# Patient Record
Sex: FEMALE | Race: ASIAN | NOT HISPANIC OR LATINO | Employment: FULL TIME | URBAN - METROPOLITAN AREA
[De-identification: names, ages, dates, MRNs, and addresses within clinical notes are randomized per-mention and may not be internally consistent; named-entity substitution may affect disease eponyms.]

---

## 2017-11-03 ENCOUNTER — OFFICE VISIT (OUTPATIENT)
Dept: OBGYN | Facility: CLINIC | Age: 27
End: 2017-11-03
Payer: COMMERCIAL

## 2017-11-03 VITALS
HEIGHT: 62 IN | DIASTOLIC BLOOD PRESSURE: 78 MMHG | WEIGHT: 160 LBS | BODY MASS INDEX: 29.44 KG/M2 | SYSTOLIC BLOOD PRESSURE: 122 MMHG

## 2017-11-03 DIAGNOSIS — Z97.5 IUD (INTRAUTERINE DEVICE) IN PLACE: ICD-10-CM

## 2017-11-03 DIAGNOSIS — Z01.419 ENCOUNTER FOR GYNECOLOGICAL EXAMINATION WITHOUT ABNORMAL FINDING: Primary | ICD-10-CM

## 2017-11-03 PROCEDURE — 99395 PREV VISIT EST AGE 18-39: CPT | Performed by: OBSTETRICS & GYNECOLOGY

## 2017-11-03 ASSESSMENT — ANXIETY QUESTIONNAIRES
1. FEELING NERVOUS, ANXIOUS, OR ON EDGE: NOT AT ALL
5. BEING SO RESTLESS THAT IT IS HARD TO SIT STILL: NOT AT ALL
7. FEELING AFRAID AS IF SOMETHING AWFUL MIGHT HAPPEN: NOT AT ALL
6. BECOMING EASILY ANNOYED OR IRRITABLE: NOT AT ALL
3. WORRYING TOO MUCH ABOUT DIFFERENT THINGS: NOT AT ALL
IF YOU CHECKED OFF ANY PROBLEMS ON THIS QUESTIONNAIRE, HOW DIFFICULT HAVE THESE PROBLEMS MADE IT FOR YOU TO DO YOUR WORK, TAKE CARE OF THINGS AT HOME, OR GET ALONG WITH OTHER PEOPLE: NOT DIFFICULT AT ALL
GAD7 TOTAL SCORE: 0
2. NOT BEING ABLE TO STOP OR CONTROL WORRYING: NOT AT ALL

## 2017-11-03 ASSESSMENT — PATIENT HEALTH QUESTIONNAIRE - PHQ9
5. POOR APPETITE OR OVEREATING: NOT AT ALL
SUM OF ALL RESPONSES TO PHQ QUESTIONS 1-9: 0

## 2017-11-03 NOTE — MR AVS SNAPSHOT
After Visit Summary   11/3/2017    Cassandra Langley    MRN: [id number]           Patient Information     Date Of Birth          1990        Visit Information        Provider Department      11/3/2017 10:40 AM Jes Foley MD [facility] [place]        Today's Diagnoses     Encounter for gynecological examination without abnormal finding    -  1    IUD (intrauterine device) in place          Care Instructions    Follow up with your primary care provider for your other medical problems.  Continue self breast exam.  Increase physical activity and exercise.  Usual safety and preventative measures counseling done.  BMI >25  Weight loss encouraged.  Last pap smear (2015) was normal.  No pap was obtained this year.  This was discussed with the patient and she agrees with the plan.          Follow-ups after your visit        Follow-up notes from your care team     Return in about 1 year (around 11/3/2018) for Annual Exam.      Who to contact     If you have questions or need follow up information about today's clinic visit or your schedule please contact [facility] [place] directly at [phone].  Normal or non-critical lab and imaging results will be communicated to you by meevlhart, letter or phone within 4 business days after the clinic has received the results. If you do not hear from us within 7 days, please contact the clinic through [employer] or phone. If you have a critical or abnormal lab result, we will notify you by phone as soon as possible.  Submit refill requests through [employer] or call your pharmacy and they will forward the refill request to us. Please allow 3 business days for your refill to be completed.          Additional Information About Your Visit        meevlhart Information     [employer] gives you secure access to your electronic health record. If you see a primary care provider, you can also send messages to your care team and make appointments. If  "you have questions, please call your primary care clinic.  If you do not have a primary care provider, please call [phone] and they will assist you.        Care EveryWhere ID     This is your Care EveryWhere ID. This could be used by other organizations to access your [place] medical records  [id number]        Your Vitals Were     Height Breastfeeding? BMI (Body Mass Index)             5' 2\" (1.575 m) No 29.26 kg/m2          Blood Pressure from Last 3 Encounters:   11/03/17 122/78   11/01/16 112/80   08/23/16 116/78    Weight from Last 3 Encounters:   11/03/17 160 lb (72.6 kg)   11/01/16 154 lb (69.9 kg)   08/23/16 154 lb (69.9 kg)              Today, you had the following     No orders found for display       Primary Care Provider Office Phone # Fax #    Miriam Faye [phone]       [address] [place] [address]        Equal Access to Services     [facility] : Hadii diandra ku hadasho Soomaali, waaxda luqadaha, qaybta kaalmada adeegyada, joann mahoney . So [facility] [phone].    ATENCIÓN: Si habla español, tiene a smith disposición servicios gratuitos de asistencia lingüística. Llame al [phone].    We comply with applicable federal civil rights laws and [place] laws. We do not discriminate on the basis of race, color, national origin, age, disability, sex, sexual orientation, or gender identity.            Thank you!     Thank you for choosing [facility] [place]  for your care. Our goal is always to provide you with excellent care. Hearing back from our patients is one way we can continue to improve our services. Please take a few minutes to complete the written survey that you may receive in the mail after your visit with us. Thank you!             Your Updated Medication List - Protect others around you: Learn how to safely use, store and throw away your medicines at www.disposemymeds.org.          This list is accurate as " of: 11/3/17 11:06 AM.  Always use your most recent med list.                   Brand Name Dispense Instructions for use Diagnosis    ADVIL PO           * KRISTOPHER 13.5 MG Iud   Generic drug:  Levonorgestrel           * levonorgestrel 20 MCG/24HR IUD    MIRENA    1 each    1 each by Intrauterine route once.        * Notice:  This list has 2 medication(s) that are the same as other medications prescribed for you. Read the directions carefully, and ask your doctor or other care provider to review them with you.

## 2017-11-03 NOTE — PATIENT INSTRUCTIONS
Follow up with your primary care provider for your other medical problems.  Continue self breast exam.  Increase physical activity and exercise.  Usual safety and preventative measures counseling done.  BMI >25  Weight loss encouraged.  Last pap smear (2015) was normal.  No pap was obtained this year.  This was discussed with the patient and she agrees with the plan.

## 2017-11-03 NOTE — PROGRESS NOTES
Cassandra is a 27 year old  female who presents for annual exam.     Besides routine health maintenance, she has no other health concerns today .    HPI:  Here today for yearly exam --doing well.  Short, monthly menses x 3d with vikash IUD.  First day heavy and with cramps but then tapers quickly.  +SA --no issues.  Denies bowel/bladder issues.    ; works for [employer] --promoted last year  -trying to stay active; works out at [employer] 2x/wk --hoping to increase this year  +SBE on occ --no issues  No PCP  -not sure if she had her flu shot at work --will check  -considering TTC next year;  would like to have 3-4 kids but she is unsure :)      GYNECOLOGIC HISTORY:    No LMP recorded. Patient is not currently having periods (Reason: IUD).  Her current contraception method is: depo or other injectable.  She  reports that she has never smoked. She has never used smokeless tobacco.      Patient is sexually active.  STD testing offered?  Declined  Last PHQ-9 score on record =   PHQ-9 SCORE 11/3/2017   Total Score 0     Last GAD7 score on record =   IVAN-7 SCORE 11/3/2017   Total Score 0     Alcohol Score = 2    HEALTH MAINTENANCE:  Cholesterol: (No results found for: CHOL NA  Last Mammo: NA, Result: not applicable, Next Mammo: NA   Pap: (  Lab Results   Component Value Date    PAP NIL 10/15/2015    10/15/15 WNL   Colonoscopy:  NA, Result: not applicable, Next Colonoscopy: NA years.  Dexa:  NA    Health maintenance updated:  yes    HISTORY:  Obstetric History       T0      L0     SAB0   TAB0   Ectopic0   Multiple0   Live Births0           Patient Active Problem List   Diagnosis     CARDIOVASCULAR SCREENING; LDL GOAL LESS THAN 160     IUD (intrauterine device) in place     Past Surgical History:   Procedure Laterality Date     NO HISTORY OF SURGERY        Social History   Substance Use Topics     Smoking status: Never Smoker     Smokeless tobacco: Never Used     Alcohol  "use 0.0 oz/week     0 Standard drinks or equivalent per week      Comment: Socially, maybe once a month      Problem (# of Occurrences) Relation (Name,Age of Onset)    Hyperlipidemia (1) Father            Current Outpatient Prescriptions   Medication Sig     Levonorgestrel (KRISTOPHER) 13.5 MG IUD      Ibuprofen (ADVIL PO)      levonorgestrel (MIRENA) 20 MCG/24HR IUD 1 each by Intrauterine route once.     No current facility-administered medications for this visit.      Allergies   Allergen Reactions     Keflex [Cephalosporins]        Past medical, surgical, social and family histories were reviewed and updated in EPIC.    ROS:   12 point review of systems negative other than symptoms noted below.    EXAM:  /78  Ht 5' 2\" (1.575 m)  Wt 160 lb (72.6 kg)  Breastfeeding? No  BMI 29.26 kg/m2   BMI: Body mass index is 29.26 kg/(m^2).    PHYSICAL EXAM:  Constitutional:  Appearance: Well nourished, well developed, alert, in no acute distress  Neck:  Lymph Nodes:  No lymphadenopathy present    Thyroid:  Gland size normal, nontender, no nodules or masses present  on palpation  Chest:  Respiratory Effort:  Breathing unlabored  Cardiovascular:    Heart: Auscultation:  Regular rate, normal rhythm, no murmurs present  Breasts: Inspection of Breasts:  No lymphadenopathy present., Palpation of Breasts and Axillae:  No masses present on palpation, no breast tenderness., Axillary Lymph Nodes:  No lymphadenopathy present. and No nodularity, asymmetry or nipple discharge bilaterally.  Gastrointestinal:   Abdominal Examination:  Abdomen nontender to palpation, tone normal without rigidity or guarding, no masses present, umbilicus without lesions   Liver and Spleen:  No hepatomegaly present, liver nontender to palpation    Hernias:  No hernias present  Lymphatic: Lymph Nodes:  No other lymphadenopathy present  Skin:  General Inspection:  No rashes present, no lesions present, no areas of  discoloration    Genitalia and Groin:  No " rashes present, no lesions present, no areas of  discoloration, no masses present  Neurologic/Psychiatric:    Mental Status:  Oriented X3     Pelvic Exam:  External Genitalia:     Normal appearance for age, no discharge present, no tenderness present, no inflammatory lesions present, color normal  Vagina:    Normal vaginal vault without central or paravaginal defects, no discharge present, no inflammatory lesions present, no masses present  Bladder:     Nontender to palpation  Urethra:   Urethral Body:  Urethra palpation normal, urethra structural support normal   Urethral Meatus:  No erythema or lesions present  Cervix:     Appearance healthy, no lesions present, nontender to palpation, no bleeding present, string present  Uterus:     Nontender to palpation, no masses present, position anteflexed, mobility: normal  Adnexa:     No adnexal tenderness present, no adnexal masses present  Perineum:     Perineum within normal limits, no evidence of trauma, no rashes or skin lesions present  Anus:     Anus within normal limits, no hemorrhoids present  Inguinal Lymph Nodes:     No lymphadenopathy present  Pubic Hair:     Normal pubic hair distribution for age  Genitalia and Groin:     No rashes present, no lesions present, no areas of discoloration, no masses present      COUNSELING:   Reviewed preventive health counseling, as reflected in patient instructions  Special attention given to:        Regular exercise       Healthy diet/nutrition       Family planning       Folic Acid Counseling      BMI: Body mass index is 29.26 kg/(m^2).  Weight management plan: Discussed healthy diet and exercise guidelines and patient will follow up in 12 months in clinic to re-evaluate.    ASSESSMENT:  27 year old female with satisfactory annual exam.    ICD-10-CM    1. Encounter for gynecological examination without abnormal finding Z01.419    2. IUD (intrauterine device) in place Z97.5        PLAN:  Patient Instructions   Follow up with  your primary care provider for your other medical problems.  Continue self breast exam.  Increase physical activity and exercise.  Usual safety and preventative measures counseling done.  BMI >25  Weight loss encouraged.  Last pap smear (2015) was normal.  No pap was obtained this year.  This was discussed with the patient and she agrees with the plan.      Jes Foley MD

## 2017-11-04 ASSESSMENT — ANXIETY QUESTIONNAIRES: GAD7 TOTAL SCORE: 0

## 2017-12-19 ENCOUNTER — OFFICE VISIT (OUTPATIENT)
Dept: FAMILY MEDICINE | Facility: CLINIC | Age: 27
End: 2017-12-19
Payer: COMMERCIAL

## 2017-12-19 VITALS
SYSTOLIC BLOOD PRESSURE: 127 MMHG | HEART RATE: 77 BPM | BODY MASS INDEX: 28.12 KG/M2 | TEMPERATURE: 98.4 F | DIASTOLIC BLOOD PRESSURE: 84 MMHG | HEIGHT: 62 IN | WEIGHT: 152.8 LBS | RESPIRATION RATE: 14 BRPM | OXYGEN SATURATION: 97 %

## 2017-12-19 DIAGNOSIS — R10.2 PELVIC CRAMPING: Primary | ICD-10-CM

## 2017-12-19 PROCEDURE — 99213 OFFICE O/P EST LOW 20 MIN: CPT | Performed by: FAMILY MEDICINE

## 2017-12-19 NOTE — NURSING NOTE
"Chief Complaint   Patient presents with     Abdominal Pain       Initial /84 (BP Location: Right arm, Patient Position: Chair, Cuff Size: Adult Large)  Pulse 77  Temp 98.4  F (36.9  C) (Oral)  Resp 14  Ht 5' 2\" (1.575 m)  Wt 152 lb 12.8 oz (69.3 kg)  SpO2 97%  BMI 27.95 kg/m2 Estimated body mass index is 27.95 kg/(m^2) as calculated from the following:    Height as of this encounter: 5' 2\" (1.575 m).    Weight as of this encounter: 152 lb 12.8 oz (69.3 kg).  Medication Reconciliation: complete   "

## 2017-12-19 NOTE — MR AVS SNAPSHOT
After Visit Summary   12/19/2017    Cassandra Langley    MRN: [id number]           Patient Information     Date Of Birth          1990        Visit Information        Provider Department      12/19/2017 9:40 AM Faustina Mcfarland,  [facility]        Today's Diagnoses     Pelvic cramping    -  1       Follow-ups after your visit        Your next 10 appointments already scheduled     Nov 06, 2018 10:40 AM CST   PHYSICAL with Jes Foley MD   [facility] ([facility] [place])    [address] [place] [id number] [phone]              Who to contact     If you have questions or need follow up information about today's clinic visit or your schedule please contact [facility] directly at [phone].  Normal or non-critical lab and imaging results will be communicated to you by MyChart, letter or phone within 4 business days after the clinic has received the results. If you do not hear from us within 7 days, please contact the clinic through MyChart or phone. If you have a critical or abnormal lab result, we will notify you by phone as soon as possible.  Submit refill requests through [email or web address] or call your pharmacy and they will forward the refill request to us. Please allow 3 business days for your refill to be completed.          Additional Information About Your Visit        MyChart Information     [email or web address] gives you secure access to your electronic health record. If you see a primary care provider, you can also send messages to your care team and make appointments. If you have questions, please call your primary care clinic.  If you do not have a primary care provider, please call [phone] and they will assist you.        Care EveryWhere ID     This is your Care EveryWhere ID. This could be used by other organizations to access your [place] medical records  [id number]        Your  "Vitals Were     Pulse Temperature Respirations Height Pulse Oximetry BMI (Body Mass Index)    77 98.4  F (36.9  C) (Oral) 14 5' 2\" (1.575 m) 97% 27.95 kg/m2       Blood Pressure from Last 3 Encounters:   12/19/17 127/84   11/03/17 122/78   11/01/16 112/80    Weight from Last 3 Encounters:   12/19/17 152 lb 12.8 oz (69.3 kg)   11/03/17 160 lb (72.6 kg)   11/01/16 154 lb (69.9 kg)              Today, you had the following     No orders found for display       Primary Care Provider Office Phone # Fax #    Miriam Faye [phone]       [facility] [address]        Equal Access to Services     [facility] : Joni heart Soanita, waaxda luqadaha, qaybta kaalmada silver, joann mahoney . So [facility] [phone].    ATENCIÓN: Si habla español, tiene a smith disposición servicios gratuitos de asistencia lingüística. [facility] [phone].    We comply with applicable federal civil rights laws and [place] laws. We do not discriminate on the basis of race, color, national origin, age, disability, sex, sexual orientation, or gender identity.            Thank you!     Thank you for choosing [facility]  for your care. Our goal is always to provide you with excellent care. Hearing back from our patients is one way we can continue to improve our services. Please take a few minutes to complete the written survey that you may receive in the mail after your visit with us. Thank you!             Your Updated Medication List - Protect others around you: Learn how to safely use, store and throw away your medicines at www.disposemymeds.org.          This list is accurate as of: 12/19/17 10:05 AM.  Always use your most recent med list.                   Brand Name Dispense Instructions for use Diagnosis    ADVIL PO           * KRISTOPHER 13.5 MG Iud   Generic drug:  Levonorgestrel           * levonorgestrel 20 MCG/24HR IUD    MIRENA    1 each "    1 each by Intrauterine route once.        * Notice:  This list has 2 medication(s) that are the same as other medications prescribed for you. Read the directions carefully, and ask your doctor or other care provider to review them with you.

## 2017-12-19 NOTE — PROGRESS NOTES
SUBJECTIVE:   Cassandra Langley is a 27 year old female who presents to clinic today for the following health issues:      ABDOMINAL   PAIN     Onset: 2 days     Description:   Character: Cramping  Location: right lower quadrant left lower quadrant carlita-umbilical region pelvic region  Radiation: None and Back    Intensity: moderate    Progression of Symptoms:  improving    Accompanying Signs & Symptoms:  Fever/Chills?: no   Gas/Bloating: YES  Nausea: no   Vomitting: no   Diarrhea?: no   Constipation:no   Dysuria or Hematuria: no    History:   Trauma: no   Previous similar pain: no    Previous tests done: none    Precipitating factors:   Does the pain change with:     Food: no      BM: no     Urination: no     Alleviating factors:  None    Therapies Tried and outcome: None    LMP:  Last week      History of of Mirena IUD, but had it replaced with Jessica in October since it was causing cramping.  No issues since placement until now.  Recently finished menses and notes continued pelvic cramping (worse on the left).  Overall, cramping is improving, but she is worried that the IUD might have shifted.    Problem list and histories reviewed & adjusted, as indicated.  Additional history: as documented    Patient Active Problem List   Diagnosis     CARDIOVASCULAR SCREENING; LDL GOAL LESS THAN 160     IUD (intrauterine device) in place     Past Surgical History:   Procedure Laterality Date     NO HISTORY OF SURGERY         Social History   Substance Use Topics     Smoking status: Never Smoker     Smokeless tobacco: Never Used     Alcohol use 0.0 oz/week     0 Standard drinks or equivalent per week      Comment: Socially, maybe once a month     Family History   Problem Relation Age of Onset     Hyperlipidemia Father              Reviewed and updated as needed this visit by clinical staff       Reviewed and updated as needed this visit by Provider         ROS:  Constitutional, HEENT, cardiovascular, pulmonary, gi and gu  "systems are negative, except as otherwise noted.      OBJECTIVE:   /84 (BP Location: Right arm, Patient Position: Chair, Cuff Size: Adult Large)  Pulse 77  Temp 98.4  F (36.9  C) (Oral)  Resp 14  Ht 5' 2\" (1.575 m)  Wt 152 lb 12.8 oz (69.3 kg)  SpO2 97%  BMI 27.95 kg/m2  Body mass index is 27.95 kg/(m^2).  GENERAL: healthy, alert and no distress   (female): normal female external genitalia, normal urethral meatus , vaginal mucosa pink, moist, well rugated and normal cervix, adnexae, and uterus without masses, mild uterine tenderness on exam, no cervical motion tenderness, IUD strings visible    ASSESSMENT/PLAN:     1. Pelvic cramping  - Most likely from menstrual cramps which are worsened by her IUD  - Reassured pt that it appears IUD has not traveled   - Offered a pelvic US if cramping continues to bother her  - Ibuprofen PRN     Follow up if symptoms are worsening or not improving    Faustina Mcfarland, DO  [facility]"

## 2018-11-06 ENCOUNTER — OFFICE VISIT (OUTPATIENT)
Dept: OBGYN | Facility: CLINIC | Age: 28
End: 2018-11-06
Payer: COMMERCIAL

## 2018-11-06 VITALS
HEART RATE: 68 BPM | BODY MASS INDEX: 28.34 KG/M2 | HEIGHT: 62 IN | SYSTOLIC BLOOD PRESSURE: 120 MMHG | DIASTOLIC BLOOD PRESSURE: 80 MMHG | WEIGHT: 154 LBS

## 2018-11-06 DIAGNOSIS — Z23 NEED FOR PROPHYLACTIC VACCINATION AND INOCULATION AGAINST INFLUENZA: ICD-10-CM

## 2018-11-06 DIAGNOSIS — Z01.419 ENCOUNTER FOR GYNECOLOGICAL EXAMINATION WITHOUT ABNORMAL FINDING: Primary | ICD-10-CM

## 2018-11-06 DIAGNOSIS — Z97.5 IUD (INTRAUTERINE DEVICE) IN PLACE: ICD-10-CM

## 2018-11-06 PROCEDURE — G0145 SCR C/V CYTO,THINLAYER,RESCR: HCPCS | Performed by: OBSTETRICS & GYNECOLOGY

## 2018-11-06 PROCEDURE — 99395 PREV VISIT EST AGE 18-39: CPT | Mod: 25 | Performed by: OBSTETRICS & GYNECOLOGY

## 2018-11-06 PROCEDURE — 90471 IMMUNIZATION ADMIN: CPT | Performed by: OBSTETRICS & GYNECOLOGY

## 2018-11-06 PROCEDURE — 90686 IIV4 VACC NO PRSV 0.5 ML IM: CPT | Performed by: OBSTETRICS & GYNECOLOGY

## 2018-11-06 ASSESSMENT — ANXIETY QUESTIONNAIRES
1. FEELING NERVOUS, ANXIOUS, OR ON EDGE: NOT AT ALL
7. FEELING AFRAID AS IF SOMETHING AWFUL MIGHT HAPPEN: NOT AT ALL
6. BECOMING EASILY ANNOYED OR IRRITABLE: NOT AT ALL
2. NOT BEING ABLE TO STOP OR CONTROL WORRYING: NOT AT ALL
3. WORRYING TOO MUCH ABOUT DIFFERENT THINGS: NOT AT ALL
IF YOU CHECKED OFF ANY PROBLEMS ON THIS QUESTIONNAIRE, HOW DIFFICULT HAVE THESE PROBLEMS MADE IT FOR YOU TO DO YOUR WORK, TAKE CARE OF THINGS AT HOME, OR GET ALONG WITH OTHER PEOPLE: NOT DIFFICULT AT ALL
5. BEING SO RESTLESS THAT IT IS HARD TO SIT STILL: NOT AT ALL
GAD7 TOTAL SCORE: 0

## 2018-11-06 ASSESSMENT — PATIENT HEALTH QUESTIONNAIRE - PHQ9
SUM OF ALL RESPONSES TO PHQ QUESTIONS 1-9: 0
5. POOR APPETITE OR OVEREATING: NOT AT ALL

## 2018-11-06 NOTE — PROGRESS NOTES
Cassandra is a 28 year old  female who presents for annual exam.     Besides routine health maintenance, she has no other health concerns today .    HPI:  Here today for yearly exam --doing well.  Monthly 3d menses with vikash IUD (2016) in place.  Heavier on first day with some cramping and then tapers quickly.  No intermenstrual bleeding/spotting.  +SA ---no issues.  Will be due for IUD removal next summer and will likely start TTC within the following year.  Had one 4-5d episode of cramping --saw her PCP and now resovled. No bowel/bladder issues.  No leaking or urgency issues.    ; works in [employer] for [email or web address]  -staying active; working out weekly currently  +SBE on occ --no concerns  Sees her PCP as needed  -agrees to flu shot today      GYNECOLOGIC HISTORY:    Patient's last menstrual period was 10/09/2018 (exact date).  Her current contraception method is: IUD.  She  reports that she has never smoked. She has never used smokeless tobacco.    Patient is sexually active.  STD testing offered?  Declined  Last PHQ-9 score on record =   PHQ-9 SCORE 2018   Total Score 0     Last GAD7 score on record =   IVAN-7 SCORE 2018   Total Score 0     Alcohol Score = 3    HEALTH MAINTENANCE:  Cholesterol: (No results found for: CHOL   Last Mammo: NA, due at age 39  Pap: 10/15/15 neg, HPV-  Colonoscopy: NA, due at age 49  Dexa: Never    Health maintenance updated:  yes    HISTORY:  Obstetric History       T0      L0     SAB0   TAB0   Ectopic0   Multiple0   Live Births0           Patient Active Problem List   Diagnosis     CARDIOVASCULAR SCREENING; LDL GOAL LESS THAN 160     IUD (intrauterine device) in place     Past Surgical History:   Procedure Laterality Date     NO HISTORY OF SURGERY        Social History   Substance Use Topics     Smoking status: Never Smoker     Smokeless tobacco: Never Used     Alcohol use 0.0 oz/week     0 Standard drinks or equivalent per week      Comment:  "Socially, maybe once a month      Problem (# of Occurrences) Relation (Name,Age of Onset)    Hyperlipidemia (1) Father            Current Outpatient Prescriptions   Medication Sig     Ibuprofen (ADVIL PO)      Levonorgestrel (KRISTOPHER) 13.5 MG IUD      [DISCONTINUED] levonorgestrel (MIRENA) 20 MCG/24HR IUD 1 each by Intrauterine route once.     No current facility-administered medications for this visit.      Allergies   Allergen Reactions     Keflex [Cephalosporins]        Past medical, surgical, social and family histories were reviewed and updated in EPIC.    ROS:   12 point review of systems negative other than symptoms noted below.    EXAM:  /80  Pulse 68  Ht 5' 2\" (1.575 m)  Wt 154 lb (69.9 kg)  LMP 10/09/2018 (Exact Date)  BMI 28.17 kg/m2   BMI: Body mass index is 28.17 kg/(m^2).    PHYSICAL EXAM:  Constitutional:  Appearance: Well nourished, well developed, alert, in no acute distress  Neck:  Lymph Nodes:  No lymphadenopathy present    Thyroid:  Gland size normal, nontender, no nodules or masses present  on palpation  Chest:  Respiratory Effort:  Breathing unlabored  Cardiovascular:    Heart: Auscultation:  Regular rate, normal rhythm, no murmurs present  Breasts: Inspection of Breasts:  No lymphadenopathy present., Palpation of Breasts and Axillae:  No masses present on palpation, no breast tenderness., Axillary Lymph Nodes:  No lymphadenopathy present. and No nodularity, asymmetry or nipple discharge bilaterally.  Gastrointestinal:   Abdominal Examination:  Abdomen nontender to palpation, tone normal without rigidity or guarding, no masses present, umbilicus without lesions   Liver and Spleen:  No hepatomegaly present, liver nontender to palpation    Hernias:  No hernias present  Lymphatic: Lymph Nodes:  No other lymphadenopathy present  Skin:  General Inspection:  No rashes present, no lesions present, no areas of  discoloration    Genitalia and Groin:  No rashes present, no lesions present, no " areas of  discoloration, no masses present  Neurologic/Psychiatric:    Mental Status:  Oriented X3     Pelvic Exam:  External Genitalia:     Normal appearance for age, no discharge present, no tenderness present, no inflammatory lesions present, color normal  Vagina:    Normal vaginal vault without central or paravaginal defects, no discharge present, no inflammatory lesions present, no masses present  Bladder:     Nontender to palpation  Urethra:   Urethral Body:  Urethra palpation normal, urethra structural support normal   Urethral Meatus:  No erythema or lesions present  Cervix:     Appearance healthy, no lesions present, nontender to palpation, no bleeding present, string present  Uterus:     Nontender to palpation, no masses present, position anteflexed, mobility: normal  Adnexa:     No adnexal tenderness present, no adnexal masses present  Perineum:     Perineum within normal limits, no evidence of trauma, no rashes or skin lesions present  Anus:     Anus within normal limits, no hemorrhoids present  Inguinal Lymph Nodes:     No lymphadenopathy present  Pubic Hair:     Normal pubic hair distribution for age  Genitalia and Groin:     No rashes present, no lesions present, no areas of discoloration, no masses present      COUNSELING:   Reviewed preventive health counseling, as reflected in patient instructions  Special attention given to:        Regular exercise       Healthy diet/nutrition       Contraception       Family planning       Folic Acid Counseling    BMI: Body mass index is 28.17 kg/(m^2).  Weight management plan: Discussed healthy diet and exercise guidelines and patient will follow up in 12 months in clinic to re-evaluate.    ASSESSMENT:  28 year old female with satisfactory annual exam.    ICD-10-CM    1. Encounter for gynecological examination without abnormal finding Z01.419    2. IUD (intrauterine device) in place Z97.5    3. Need for prophylactic vaccination and inoculation against influenza  Z23 FLU VACCINE, SPLIT VIRUS, IM (QUADRIVALENT) [69313]- >3 YRS     Vaccine Administration, Initial [10993]       PLAN:  Patient Instructions   Follow up with your primary care provider for your other medical problems.  Continue self breast exam.  Increase physical activity and exercise.  Lab and pap smear results will be called to the patient.  Reflex pap smear done today and will repeat in 3yrs if negative.  Usual safety and preventative measures counseling done.  BMI >25  Weight loss encouraged.  Flu Shot today.  Will return next August for IUD removal.      Jes Foley MD    Injectable Influenza Immunization Documentation    1.  Is the person to be vaccinated sick today?   No    2. Does the person to be vaccinated have an allergy to a component   of the vaccine?   No  Egg Allergy Algorithm Link    3. Has the person to be vaccinated ever had a serious reaction   to influenza vaccine in the past?   No    4. Has the person to be vaccinated ever had Guillain-Barré syndrome?   No    Form completed by Triny Pedroza MA

## 2018-11-06 NOTE — MR AVS SNAPSHOT
After Visit Summary   11/6/2018    Cassandra Langley    MRN: [id number]           Patient Information     Date Of Birth          1990        Visit Information        Provider Department      11/6/2018 10:40 AM Jes Foley MD [facility]        Today's Diagnoses     Encounter for gynecological examination without abnormal finding    -  1    IUD (intrauterine device) in place        Need for prophylactic vaccination and inoculation against influenza          Care Instructions    Follow up with your primary care provider for your other medical problems.  Continue self breast exam.  Increase physical activity and exercise.  Lab and pap smear results will be called to the patient.  Reflex pap smear done today and will repeat in 3yrs if negative.  Usual safety and preventative measures counseling done.  BMI >25  Weight loss encouraged.  Flu Shot today.  Will return next August for IUD removal.          Follow-ups after your visit        Follow-up notes from your care team     Return in about 1 year (around 11/6/2019) for Annual Exam.      Your next 10 appointments already scheduled     Aug 16, 2019 11:30 AM CDT   Office Visit with Jes Foley MD   [facility] ([facility])    [address] [place] [id number] [phone]           Bring a current list of meds and any records pertaining to this visit. For Physicals, please bring immunization records and any forms needing to be filled out. Please arrive 10 minutes early to complete paperwork.              Who to contact     If you have questions or need follow up information about today's clinic visit or your schedule please contact [facility] directly at [phone].  Normal or non-critical lab and imaging results will be communicated to you by MyChart, letter or phone within 4 business days after the clinic has received the  "results. If you do not hear from us within 7 days, please contact the clinic through [employer] or phone. If you have a critical or abnormal lab result, we will notify you by phone as soon as possible.  Submit refill requests through [employer] or call your pharmacy and they will forward the refill request to us. Please allow 3 business days for your refill to be completed.          Additional Information About Your Visit        [employer] Information     [employer] gives you secure access to your electronic health record. If you see a primary care provider, you can also send messages to your care team and make appointments. If you have questions, please call your primary care clinic.  If you do not have a primary care provider, please call [phone] and they will assist you.        Care EveryWhere ID     This is your Care EveryWhere ID. This could be used by other organizations to access your [facility] medical records  [id number]        Your Vitals Were     Pulse Height Last Period BMI (Body Mass Index)          68 5' 2\" (1.575 m) 10/09/2018 (Exact Date) 28.17 kg/m2         Blood Pressure from Last 3 Encounters:   11/06/18 120/80   12/19/17 127/84   11/03/17 122/78    Weight from Last 3 Encounters:   11/06/18 154 lb (69.9 kg)   12/19/17 152 lb 12.8 oz (69.3 kg)   11/03/17 160 lb (72.6 kg)              We Performed the Following     FLU VACCINE, SPLIT VIRUS, IM (QUADRIVALENT) [58675]- >3 YRS     Vaccine Administration, Initial [64650]        Primary Care Provider    None Specified       No primary provider on file.        Equal Access to Services     [facility] : Hadii diandra Parikh, wavee stratton, qaybta kaaljoann joshua. So [facility] [phone].    ATENCIÓN: Si habla español, tiene a smith disposición servicios gratuitos de asistencia lingüística. Llame al [phone].    We comply with applicable federal civil rights laws and [place] laws. We do not discriminate on " the basis of race, color, national origin, age, disability, sex, sexual orientation, or gender identity.            Thank you!     Thank you for choosing [facility]  for your care. Our goal is always to provide you with excellent care. Hearing back from our patients is one way we can continue to improve our services. Please take a few minutes to complete the written survey that you may receive in the mail after your visit with us. Thank you!             Your Updated Medication List - Protect others around you: Learn how to safely use, store and throw away your medicines at www.disposemymeds.org.          This list is accurate as of 11/6/18  1:18 PM.  Always use your most recent med list.                   Brand Name Dispense Instructions for use Diagnosis    ADVIL PO           KRISTOPHER 13.5 MG IUD   Generic drug:  levonorgestrel

## 2018-11-06 NOTE — PATIENT INSTRUCTIONS
Follow up with your primary care provider for your other medical problems.  Continue self breast exam.  Increase physical activity and exercise.  Lab and pap smear results will be called to the patient.  Reflex pap smear done today and will repeat in 3yrs if negative.  Usual safety and preventative measures counseling done.  BMI >25  Weight loss encouraged.  Flu Shot today.  Will return next August for IUD removal.

## 2018-11-07 ASSESSMENT — ANXIETY QUESTIONNAIRES: GAD7 TOTAL SCORE: 0

## 2018-11-09 LAB
COPATH REPORT: NORMAL
PAP: NORMAL

## 2019-09-30 ENCOUNTER — HEALTH MAINTENANCE LETTER (OUTPATIENT)
Age: 29
End: 2019-09-30

## 2019-10-24 ENCOUNTER — TELEPHONE (OUTPATIENT)
Dept: OBGYN | Facility: CLINIC | Age: 29
End: 2019-10-24

## 2019-10-24 NOTE — TELEPHONE ENCOUNTER
Pt calling with questions about her Jessica IUD  Scheduled IUD replacement slightly over the 3 year date, asking if it is ok to leave it in for one extra month.  Reassured pt she will be fine. The company is recommending 3 year replacement as they know the effectiveness for contraception for sure lasts that long, there is no adverse effects from staying in longer, just may not be as affective for contraception. BUM recommended.    Pt verbalized understanding, in agreement with plan, and voiced no further questions.  Deena Zapata RN on 10/24/2019 at 9:26 AM

## 2019-11-26 ENCOUNTER — OFFICE VISIT (OUTPATIENT)
Dept: OBGYN | Facility: CLINIC | Age: 29
End: 2019-11-26
Payer: COMMERCIAL

## 2019-11-26 VITALS — WEIGHT: 147 LBS | BODY MASS INDEX: 26.89 KG/M2 | DIASTOLIC BLOOD PRESSURE: 80 MMHG | SYSTOLIC BLOOD PRESSURE: 122 MMHG

## 2019-11-26 DIAGNOSIS — Z30.432 ENCOUNTER FOR IUD REMOVAL: ICD-10-CM

## 2019-11-26 DIAGNOSIS — Z30.49 ENCOUNTER FOR SURVEILLANCE OF NUVARING: ICD-10-CM

## 2019-11-26 DIAGNOSIS — Z01.419 ENCOUNTER FOR GYNECOLOGICAL EXAMINATION WITHOUT ABNORMAL FINDING: Primary | ICD-10-CM

## 2019-11-26 PROCEDURE — 58301 REMOVE INTRAUTERINE DEVICE: CPT | Performed by: OBSTETRICS & GYNECOLOGY

## 2019-11-26 PROCEDURE — 99395 PREV VISIT EST AGE 18-39: CPT | Mod: 25 | Performed by: OBSTETRICS & GYNECOLOGY

## 2019-11-26 RX ORDER — ETONOGESTREL AND ETHINYL ESTRADIOL VAGINAL RING .015; .12 MG/D; MG/D
1 RING VAGINAL
Qty: 3 EACH | Refills: 3 | Status: SHIPPED | OUTPATIENT
Start: 2019-11-26 | End: 2021-02-17

## 2019-11-26 ASSESSMENT — ANXIETY QUESTIONNAIRES
3. WORRYING TOO MUCH ABOUT DIFFERENT THINGS: NOT AT ALL
IF YOU CHECKED OFF ANY PROBLEMS ON THIS QUESTIONNAIRE, HOW DIFFICULT HAVE THESE PROBLEMS MADE IT FOR YOU TO DO YOUR WORK, TAKE CARE OF THINGS AT HOME, OR GET ALONG WITH OTHER PEOPLE: NOT DIFFICULT AT ALL
5. BEING SO RESTLESS THAT IT IS HARD TO SIT STILL: NOT AT ALL
6. BECOMING EASILY ANNOYED OR IRRITABLE: NOT AT ALL
7. FEELING AFRAID AS IF SOMETHING AWFUL MIGHT HAPPEN: NOT AT ALL
1. FEELING NERVOUS, ANXIOUS, OR ON EDGE: NOT AT ALL
GAD7 TOTAL SCORE: 0
2. NOT BEING ABLE TO STOP OR CONTROL WORRYING: NOT AT ALL

## 2019-11-26 ASSESSMENT — PATIENT HEALTH QUESTIONNAIRE - PHQ9
SUM OF ALL RESPONSES TO PHQ QUESTIONS 1-9: 0
5. POOR APPETITE OR OVEREATING: NOT AT ALL

## 2019-11-26 NOTE — PATIENT INSTRUCTIONS
Follow up with your primary care provider for your other medical problems.  Continue self breast exam.  Increase physical activity and exercise.  Usual safety and preventative measures counseling done.  Last pap smear (2018) was normal and negative for the DNA of high risk HPV subtypes.  No pap was obtained this year.  This was discussed with the patient and she agrees with the plan.  Jessica IUD removed today without issues.  Will start nuvaring this Sunday and use until ready to start trying to conceive.

## 2019-11-26 NOTE — PROGRESS NOTES
Cassandra is a 29 year old  female who presents for annual exam.     Besides routine health maintenance,  she would like to discuss birth control options.    HPI:  Here today for yearly exam and IUD removal.  Regular, monthly menses x 3d with vikash IUD (2016).  Planning for removal today and unsure about family planning moving forward.   ready to start family.  Cassandra recently returned from [place].  Has used nuvaring in the past without issues.  Will use again until ready to TTC.  +SA --no issues.  Denies bowel/bladder issues    ; works in [employer] for [employer]  -staying active; trying to work out regularily  +SBE --no issues  -declines flu shot  -has not yet started PNV      GYNECOLOGIC HISTORY:    Patient's last menstrual period was 2019 (approximate).    Regular menses? yes  Menses every 28 days.  Length of menses: 3 days    Her current contraception method is: IUD.  She  reports that she has never smoked. She has never used smokeless tobacco.    Patient is sexually active.  STD testing offered?  Declined  Last PHQ-9 score on record =   PHQ-9 SCORE 2019   PHQ-9 Total Score 0     Last GAD7 score on record =   IVAN-7 SCORE 2019   Total Score 0     Alcohol Score = 1    HEALTH MAINTENANCE:  Cholesterol: (No results found for: CHOL NA  Last Mammo: Not applicable, Result: Not applicable, Next Mammo: Due at age 39  Pap:   Lab Results   Component Value Date    PAP NIL 2018    PAP NIL 10/15/2015    11/6/18 WNL   Colonoscopy:  NA, Result: Not applicable, Next Colonoscopy: NA years.  Dexa:  NA    Health maintenance updated:  yes    HISTORY:  OB History    Para Term  AB Living   0 0 0 0 0 0   SAB TAB Ectopic Multiple Live Births   0 0 0 0 0       Patient Active Problem List   Diagnosis     CARDIOVASCULAR SCREENING; LDL GOAL LESS THAN 160     IUD (intrauterine device) in place     Past Surgical History:   Procedure Laterality Date     NO HISTORY OF SURGERY         Social History     Tobacco Use     Smoking status: Never Smoker     Smokeless tobacco: Never Used   Substance Use Topics     Alcohol use: Yes     Alcohol/week: 0.0 standard drinks     Comment: Socially, maybe once a month      Problem (# of Occurrences) Relation (Name,Age of Onset)    Hyperlipidemia (1) Father    No Known Problems (8) Mother, Maternal Grandmother, Maternal Grandfather, Paternal Grandmother, Paternal Grandfather, Sister, Brother, Other            Current Outpatient Medications   Medication Sig     etonogestrel-ethinyl estradiol (NUVARING) 0.12-0.015 MG/24HR vaginal ring Place 1 each vaginally every 28 days     Ibuprofen (ADVIL PO)      Levonorgestrel (KRISTOPHER) 13.5 MG IUD      No current facility-administered medications for this visit.      Allergies   Allergen Reactions     Keflex [Cephalosporins]        Past medical, surgical, social and family histories were reviewed and updated in EPIC.    ROS:   12 point review of systems negative other than symptoms noted below or in the HPI.  No urinary frequency or dysuria, bladder or kidney problems, Normal menstrual cycles    EXAM:  /80   Wt 66.7 kg (147 lb)   LMP 11/19/2019 (Approximate)   Breastfeeding No   BMI 26.89 kg/m     BMI: Body mass index is 26.89 kg/m .    PHYSICAL EXAM:  Constitutional:   Appearance: Well nourished, well developed, alert, in no acute distress  Neck:  Lymph Nodes:  No lymphadenopathy present    Thyroid:  Gland size normal, nontender, no nodules or masses present  on palpation  Chest:  Respiratory Effort:  Breathing unlabored  Cardiovascular:    Heart: Auscultation:  Regular rate, normal rhythm, no murmurs present  Breasts: Deferred.  Gastrointestinal:   Abdominal Examination:  Abdomen nontender to palpation, tone normal without rigidity or guarding, no masses present, umbilicus without lesions   Liver and Spleen:  No hepatomegaly present, liver nontender to palpation    Hernias:  No hernias present  Lymphatic: Lymph  Nodes:  No other lymphadenopathy present  Skin:  General Inspection:  No rashes present, no lesions present, no areas of  discoloration  Neurologic:    Mental Status:  Oriented X3.  Normal strength and tone, sensory exam                grossly normal, mentation intact and speech normal.    Psychiatric:   Mentation appears normal and affect normal/bright.         Pelvic Exam:  External Genitalia:     Normal appearance for age, no discharge present, no tenderness present, no inflammatory lesions present, color normal  Vagina:    Normal vaginal vault without central or paravaginal defects, no discharge present, no inflammatory lesions present, no masses present  Bladder:     Nontender to palpation  Urethra:   Urethral Body:  Urethra palpation normal, urethra structural support normal   Urethral Meatus:  No erythema or lesions present  Cervix:     Appearance healthy, no lesions present, nontender to palpation, no bleeding present, string present  Uterus:     Nontender to palpation, no masses present, position anteflexed, mobility: normal  Adnexa:     No adnexal tenderness present, no adnexal masses present  Perineum:     Perineum within normal limits, no evidence of trauma, no rashes or skin lesions present  Anus:     Anus within normal limits, no hemorrhoids present  Inguinal Lymph Nodes:     No lymphadenopathy present  Pubic Hair:     Normal pubic hair distribution for age  Genitalia and Groin:     No rashes present, no lesions present, no areas of discoloration, no masses present      COUNSELING:   Reviewed preventive health counseling, as reflected in patient instructions  Special attention given to:        Regular exercise       Healthy diet/nutrition       Family planning       Folic Acid Counseling    BMI: Body mass index is 26.89 kg/m .  Weight management plan: Discussed healthy diet and exercise guidelines    ASSESSMENT:  29 year old female with satisfactory annual exam.    ICD-10-CM    1. Encounter for  gynecological examination without abnormal finding Z01.419    2. Encounter for IUD removal Z30.432    3. Encounter for surveillance of nuvaring Z30.49 etonogestrel-ethinyl estradiol (NUVARING) 0.12-0.015 MG/24HR vaginal ring       PLAN:  Patient Instructions   Follow up with your primary care provider for your other medical problems.  Continue self breast exam.  Increase physical activity and exercise.  Usual safety and preventative measures counseling done.  Last pap smear (2018) was normal and negative for the DNA of high risk HPV subtypes.  No pap was obtained this year.  This was discussed with the patient and she agrees with the plan.  Jessica IUD removed today without issues.  Will start nuvaring this  and use until ready to start trying to conceive.      Jes Foley MD  IUD Removal:  SUBJECTIVE:    Is a pregnancy test required: No.  Was a consent obtained?  Yes    Cassandra Langley is a 29 year old female,, Patient's last menstrual period was 2019 (approximate). who presents today for IUD removal. Her current IUD was placed 3 years ago. She has not had problems with the IUD. She requests removal of the IUD because she wants to change her method of contraception and the IUD effectiveness has     Today's PHQ-2 Score:   PHQ-2 (  [employer]) 2017   Q1: Little interest or pleasure in doing things 0   Q2: Feeling down, depressed or hopeless 0   PHQ-2 Score 0       PROCEDURE:    A speculum exam was performed and the cervix was visualized. The IUD string was visualized. Using ring forceps, the string  was grasped and the IUD removed intact.    POST PROCEDURE:    The patient tolerated the procedure well. Patient was discharged in stable condition.    Call if bleeding, pain or fever occur., Birth control counseling given. and nuvaring prescription sent    Jes Foley MD

## 2019-11-27 ASSESSMENT — ANXIETY QUESTIONNAIRES: GAD7 TOTAL SCORE: 0

## 2020-01-08 ENCOUNTER — OFFICE VISIT (OUTPATIENT)
Dept: FAMILY MEDICINE | Facility: CLINIC | Age: 30
End: 2020-01-08
Payer: COMMERCIAL

## 2020-01-08 VITALS
DIASTOLIC BLOOD PRESSURE: 72 MMHG | OXYGEN SATURATION: 98 % | BODY MASS INDEX: 27.23 KG/M2 | TEMPERATURE: 99.4 F | HEIGHT: 62 IN | WEIGHT: 148 LBS | HEART RATE: 82 BPM | SYSTOLIC BLOOD PRESSURE: 106 MMHG

## 2020-01-08 DIAGNOSIS — H65.01 NON-RECURRENT ACUTE SEROUS OTITIS MEDIA OF RIGHT EAR: ICD-10-CM

## 2020-01-08 DIAGNOSIS — R05.9 COUGH: Primary | ICD-10-CM

## 2020-01-08 LAB
FLUAV+FLUBV AG SPEC QL: NEGATIVE
FLUAV+FLUBV AG SPEC QL: NEGATIVE
SPECIMEN SOURCE: NORMAL

## 2020-01-08 PROCEDURE — 87804 INFLUENZA ASSAY W/OPTIC: CPT | Performed by: FAMILY MEDICINE

## 2020-01-08 PROCEDURE — 99214 OFFICE O/P EST MOD 30 MIN: CPT | Performed by: FAMILY MEDICINE

## 2020-01-08 RX ORDER — CODEINE PHOSPHATE AND GUAIFENESIN 10; 100 MG/5ML; MG/5ML
1-2 SOLUTION ORAL EVERY 4 HOURS PRN
Qty: 118 ML | Refills: 1 | Status: SHIPPED | OUTPATIENT
Start: 2020-01-08 | End: 2020-02-25

## 2020-01-08 RX ORDER — AZITHROMYCIN 250 MG/1
TABLET, FILM COATED ORAL
Qty: 6 TABLET | Refills: 1 | Status: SHIPPED | OUTPATIENT
Start: 2020-01-08 | End: 2020-02-25

## 2020-01-08 ASSESSMENT — MIFFLIN-ST. JEOR: SCORE: 1344.57

## 2020-01-08 NOTE — PROGRESS NOTES
"  SUBJECTIVE:                                                    Cassandra Langley is a 29 year old female who presents to clinic today for the following health issues:    Acute Illness   Acute illness concerns: cough  Onset: x 1week    Fever: no     Chills/Sweats: YES    Headache (location?): YES    Sinus Pressure:YES    Conjunctivitis:  YES:both    Ear Pain: no    Rhinorrhea: YES    Congestion: YES    Sore Throat: YES- x1 day due to coughing     Cough: YES-non-productive, improving over time    Wheeze: no     Decreased Appetite: no    Nausea: no    Vomiting: no    Diarrhea:  YES    Dysuria/Freq.: no    Fatigue/Achiness: YES    Sick/Strep Exposure: no      Therapies Tried and outcome: dayquil, Nyquil, mucinex,       Patient presents to clinic with complains of persistent cough. The cough makes it difficult to sleep during the night. Will experience pain in the throat during the night.The Nyquil has not be helpful and will experience persistent cough. Denies any exposure to whooping cough. Some ear pain reported L>R.    Problem list and histories reviewed & adjusted, as indicated.  Additional history: as documented    Reviewed and updated as needed this visit by clinical staff  Tobacco  Allergies  Meds  Med Hx  Surg Hx  Fam Hx  Soc Hx      Reviewed and updated as needed this visit by Provider         ROS:   ROS: 12 point ROS neg other than the symptoms noted above  This document serves as a record of the services and decisions personally performed and made by Miriam Faye MD. It was created on her behalf by Whitney Ontiveros, a trained medical scribe. The creation of this document is based on the provider's statements to the medical scribe.  Whitney Ontiveros January 8, 2020  OBJECTIVE:                                                    /72   Pulse 82   Temp 99.4  F (37.4  C)   Ht 1.575 m (5' 2\")   Wt 67.1 kg (148 lb)   LMP 12/19/2019   SpO2 98%   BMI 27.07 kg/m    Body mass index is 27.07 kg/m . "   GENERAL: healthy, alert, well nourished, well hydrated, no distress  HENT: ear canals- normal; left TMs- normal; the right TM is dull, red and bulging with fluid behind it; Nose- congested; Mouth- no ulcers, no lesions  NECK: no tenderness, no adenopathy, no asymmetry, no masses, no stiffness; thyroid- normal to palpation  RESP: lungs clear to auscultation - no rales, no rhonchi, no wheezes  CV: regular rates and rhythm, normal S1 S2, no S3 or S4 and no murmur, no click or rub -  ABDOMEN: soft, no tenderness, no  hepatosplenomegaly, no masses, normal bowel sounds    Diagnostic test results:  Diagnostic Test Results:  Labs reviewed in Epic  Results for orders placed or performed in visit on 01/08/20 (from the past 24 hour(s))   Influenza A/B antigen   Result Value Ref Range    Influenza A/B Agn Specimen Nasal     Influenza A Negative NEG^Negative    Influenza B Negative NEG^Negative        ASSESSMENT/PLAN:                                                        ICD-10-CM    1. Cough R05 Influenza A/B antigen     azithromycin (ZITHROMAX) 250 MG tablet     guaiFENesin-codeine (ROBITUSSIN AC) 100-10 MG/5ML solution   2. Non-recurrent acute serous otitis media of right ear H65.01 azithromycin (ZITHROMAX) 250 MG tablet     Influenza testing reassuring. Negative. Patient to take 250 mg of Azithromycin and Robitussin to alleviate symptoms. Educated patient to take the antibiotics for 5 day but will last in system for about 10 days.  Recommend she cough and sneeze in the sleeve. Wipe down all the door handles and sink with Clorox wipes.   Provided a note for work    Please, call or return to clinic or go to the ER immediately if signs or symptoms worsen or fail to improve as anticipated         Miriam Faye MD    [facility]

## 2020-01-08 NOTE — LETTER
[facility]  [address] [place] [id number]  Office: [phone]   Fax:    [phone]           January 8, 2020    Cassandra Langley  [address] [place] [facility] [address]      To Whom it May Concern:    The above patient is unable to attend work for today and perhaps the next 1-2 days  due to a medical issue.  She may have decreased productivity even when working from home this week secondary to her illness.  Please contact me with questions or concerns.      Sincerely,       Miriam Faye M.D.

## 2020-01-08 NOTE — PATIENT INSTRUCTIONS
Patient Education            Understanding the Cold Virus  Colds are the most common illness that people get. Most adults get 2 or 3 colds per year, and most children get 5 to 7 colds per year. Colds may be caused by over 200 types of viruses. The most common of these are rhinoviruses ( rhino  refers to the nose).  What causes a cold virus?  All colds start with infection by a virus. You can be infected by more than 1 cold virus at a time. Colds and flu are respiratory illnesses, but they are caused by different viruses. Infection with cold viruses happens when:    You breathe in a virus from the air. This can happen when someone with a cold sneezes or coughs near you.    You touch your eyes, nose, or mouth when your hand has a cold virus on it. This can happen if you touch an object that has the cold virus on it.  What are the symptoms of a cold virus?  You may wonder if you have a cold or the flu. Compared to the flu, cold symptoms come on more gradually. Almost all colds cause a stuffy nose. Other common cold symptoms include:    Runny nose    Sneezing    Sore throat    Cough    Fatigue (sometimes)    Fever (rare)    Headache (rare)  How is a cold treated?  Colds usually last 5 to 10 days. Treatment focuses on relieving symptoms. Treatments may include:    Decongestant medicines. Several types of decongestants are available without prescription. These may help reduce stuffy or runny nose symptoms.    Prescription or over-the-counter nasal sprays. These may help reduce nasal symptoms, including stuffiness.    Over-the-counter (OTC) pain medicines. These can help with headaches and sore throat.    Self-care. This includes extra rest, using humidifiers, and drinking more fluids. These help you feel better while you are getting over a cold.  Because viruses cause colds, antibiotics do not help. They do not make a cold shorter or relieve symptoms. Taking antibiotics when you don t need them can make them work less  well when you need them for another illness.  Follow all directions for using medicines, especially when giving them to children. Contact your healthcare provider if you have any questions about using cold medicines safely. Before buying cold medicines, make a list of any prescription medicines you take and ask the pharmacist what OTC cold medicines are safe for you to use.  Can a cold be prevented?  You can help reduce the spread of cold viruses. This can help both you and others avoid getting colds. Follow these tips:    Wash your hands well anytime you may have come into contact with cold viruses. Wash your hands for at least 20 seconds. When you can t wash with soap and water, use an alcohol-based hand .    Don t touch your nose, eyes, or mouth, especially after touching something that may have a cold virus on it.    Cover your mouth and nose when you cough or sneeze. Throw away tissues after using them and wash your hands.    Disinfect things you touch often, such as phones and keyboards.      Stay home when you have a cold.  What are possible complications of a cold virus?  Colds usually go away by themselves. But you may get another type of infection while you have a cold. These can include:    Sinus infection    Lung infection, such as bronchitis or pneumonia    Ear infection  If you have asthma or chronic bronchitis, a cold can make your condition worse.  When should I call my healthcare provider?  Call your healthcare provider right away if you have any of these:    Fever of 100.4 F (38 C) or higher, or as directed by your healthcare provider    Cough, chest pain, or shortness of breath that gets worse    Symptoms don t get better or get worse after about 10 days    Headache, sleepiness, or confusion that gets worse  Date Last Reviewed: 3/28/2016    7807-6090 [employer]. [address] [place] [address], [place] [address]. All rights reserved. This information is not intended as a substitute  "for professional medical care. Always follow your healthcare professional's instructions.           Patient Education     Whooping Cough (Pertussis) (Adult)  Whooping cough (pertussis) is a bacterial infection in the respiratory tract. It can be a very serious infection in infants and older adults. In healthy older children and adults, it is generally mild.  Pertussis is highly contagious. The infection is spread through the air by coughing or sneezing. The illness starts like an ordinary cold with mild cough, congestion, and low fever. Symptoms then develop that may include:    Coughing spells that cause a \"whooping\" sound when breathing in    Gagging or vomiting after coughing    Poor appetite    Feeling very tired    Thick mucus in the nose and throat  Antibiotics are used to treat this illness. Even with treatment, it may take up to 3 months for the cough to go away completely.  Vaccination prevents pertussis in children. The vaccine effect lessens after 5 to 10 years. So a booster vaccine is often needed. Teens and adults who were vaccinated as children and have not had a booster can be infected and may spread the infection to unvaccinated infants and children. Be sure to ask your healthcare provider whether anyone in your household needs a booster vaccination.  Home care    Take all medicine as prescribed by the healthcare provider. Be sure to take antibiotics as directed until they are gone, even if you feel better. If you don't finish the antibiotics, the infection may come back and be harder to treat.    Rest and get plenty of sleep.    Stay home from work or school until you have completed at least 5 days of antibiotic treatment. If antibiotics are not used, stay home until 21 days after you first had symptoms of a cough. When resuming activity, go back to your normal routine gradually.    Avoid exposure to cigarette smoke.    Ask your healthcare provider before taking over-the-counter medicine for fever, " pain, and coughing.    To avoid loss of fluids (dehydration), try drinking 6 to 8 glasses of fluids (water, juice, tea, soup) a day. Fluids will help loosen secretions in the nose and lungs.    Cover your mouth and nose when you sneeze or cough. Dispose of any tissues properly.    Wash your hands well with soap and water after you cough or sneeze, and frequently throughout the day.  Follow-up care  Follow up with your healthcare provider as advised.  When to seek medical advice  Call your healthcare provider right away for any of the following:    Trouble breathing or painful breathing    Cough with repeated gagging or vomiting    Coughing up colored or bloody mucus    Severe headache or face, neck, or ear pain    Fever over 100.4 F (38.0 C) for more than 3 days    You don't start improving within 1 week  Date Last Reviewed: 3/1/2018    2966-1812 [employer]. [address] [place] [address]. All rights reserved. This information is not intended as a substitute for professional medical care. Always follow your healthcare professional's instructions.         Thank you so much or choosing [facility]  for your Health Care. It was a pleasure seeing you at your visit today! Please contact us with any questions or concerns you may have.                   Miriam Faye MD                              To reach your [facility] care team after hours call:   [phone]    Our clinic hours are:     Monday- 7:30 am - 7:00 pm                             Tuesday through Friday- 7:30 am - 5:00 pm                                        Saturday- 8:00 am - 12:00 pm                  Phone:  [phone]    Our pharmacy hours are:     Monday  8:00 am to 7:00 pm      Tuesday through Friday 8:00am to 6:00pm                        Saturday - 9:00 am to 1:00 pm      Sunday : Closed.              Phone:  [phone]      There is also information  available at our web site:  [email or web address]    If your provider ordered any lab tests and you do not receive the results within 10 business days, please call the clinic.    If you need a medication refill please contact your pharmacy.  Please allow 2 business days for your refill to be completed.    Our clinic offers telephone visits and e visits.  Please ask one of your team members to explain more.      Use [employer] (secure email communication and access to your chart) to send your primary care provider a message or make an appointment. Ask someone on your Team how to sign up for [employer].

## 2020-02-25 ENCOUNTER — OFFICE VISIT (OUTPATIENT)
Dept: FAMILY MEDICINE | Facility: CLINIC | Age: 30
End: 2020-02-25
Payer: COMMERCIAL

## 2020-02-25 ENCOUNTER — HOSPITAL ENCOUNTER (OUTPATIENT)
Dept: ULTRASOUND IMAGING | Facility: CLINIC | Age: 30
Discharge: HOME | End: 2020-02-25
Attending: NURSE PRACTITIONER | Admitting: NURSE PRACTITIONER
Payer: COMMERCIAL

## 2020-02-25 VITALS
TEMPERATURE: 98.9 F | SYSTOLIC BLOOD PRESSURE: 106 MMHG | HEIGHT: 62 IN | BODY MASS INDEX: 26.87 KG/M2 | HEART RATE: 84 BPM | OXYGEN SATURATION: 98 % | WEIGHT: 146 LBS | DIASTOLIC BLOOD PRESSURE: 78 MMHG

## 2020-02-25 DIAGNOSIS — R31.9 HEMATURIA, UNSPECIFIED TYPE: ICD-10-CM

## 2020-02-25 DIAGNOSIS — R10.9 FLANK PAIN: ICD-10-CM

## 2020-02-25 DIAGNOSIS — R10.9 FLANK PAIN: Primary | ICD-10-CM

## 2020-02-25 LAB
ALBUMIN UR-MCNC: NEGATIVE MG/DL
APPEARANCE UR: CLEAR
BACTERIA #/AREA URNS HPF: ABNORMAL /HPF
BILIRUB UR QL STRIP: NEGATIVE
COLOR UR AUTO: YELLOW
GLUCOSE UR STRIP-MCNC: NEGATIVE MG/DL
HCG UR QL: NEGATIVE
HGB UR QL STRIP: ABNORMAL
KETONES UR STRIP-MCNC: NEGATIVE MG/DL
LEUKOCYTE ESTERASE UR QL STRIP: NEGATIVE
NITRATE UR QL: NEGATIVE
NON-SQ EPI CELLS #/AREA URNS LPF: ABNORMAL /LPF
PH UR STRIP: 6.5 PH (ref 5–7)
RBC #/AREA URNS AUTO: ABNORMAL /HPF
SOURCE: ABNORMAL
SP GR UR STRIP: 1.02 (ref 1–1.03)
UROBILINOGEN UR STRIP-ACNC: 0.2 EU/DL (ref 0.2–1)
WBC #/AREA URNS AUTO: ABNORMAL /HPF

## 2020-02-25 PROCEDURE — 76705 ECHO EXAM OF ABDOMEN: CPT

## 2020-02-25 PROCEDURE — 81001 URINALYSIS AUTO W/SCOPE: CPT | Performed by: NURSE PRACTITIONER

## 2020-02-25 PROCEDURE — 81025 URINE PREGNANCY TEST: CPT | Performed by: NURSE PRACTITIONER

## 2020-02-25 PROCEDURE — 99214 OFFICE O/P EST MOD 30 MIN: CPT | Performed by: NURSE PRACTITIONER

## 2020-02-25 ASSESSMENT — MIFFLIN-ST. JEOR: SCORE: 1335.5

## 2020-02-25 NOTE — LETTER
February 25, 2020      Cassandra Langley  [address]  [facility] [address]        To Whom It May Concern:      Cassandra Langley was seen in our clinic for acute flank pain. She may return to work on 2/26/2020 if her symptoms have improved.          Sincerely,    Loretta Lamb, GABRIELLA CNP

## 2020-02-25 NOTE — PROGRESS NOTES
Subjective     Cassandra Langley is a 29 year old female who presents to clinic today for the following health issues:    HPI   URINARY TRACT SYMPTOMS  Onset: x 2 days   Patient reports waking up  morning and felt cramping on right side and a dull ache turned into a sharp pain (30 minutes and then improved).    Similar dull ache happened in the morning yesterday.    Today dull ache progressed to sharp pain in right side/flank area.        Description:   Painful urination (Dysuria): no            Frequency: YES  Blood in urine (Hematuria): no   Delay in urine (Hesitency): no     Intensity: mild    Progression of Symptoms:  intermittent    Accompanying Signs & Symptoms:  Fever/chills: no   Flank pain YES- right side- intermittently - morning   Nausea and vomiting: no   Any vaginal symptoms: none  Abdominal/Pelvic Pain: YES- pelvic pressure    History:   History of frequent UTI's: no   History of kidney stones: no   Sexually Active: YES  Possibility of pregnancy: Not sure, mom thinks patient is.     Precipitating factors:   nothing    Therapies Tried and outcome: Increase fluid intake      LMP:  2020      Patient Active Problem List   Diagnosis     CARDIOVASCULAR SCREENING; LDL GOAL LESS THAN 160     Past Surgical History:   Procedure Laterality Date     NO HISTORY OF SURGERY         Social History     Tobacco Use     Smoking status: Never Smoker     Smokeless tobacco: Never Used   Substance Use Topics     Alcohol use: Yes     Alcohol/week: 0.0 standard drinks     Comment: Socially, maybe once a month     Family History   Problem Relation Age of Onset     No Known Problems Mother      Hyperlipidemia Father      No Known Problems Maternal Grandmother      No Known Problems Maternal Grandfather      No Known Problems Paternal Grandmother      No Known Problems Paternal Grandfather      No Known Problems Sister      No Known Problems Brother      No Known Problems Other          Current Outpatient  "Medications   Medication Sig Dispense Refill     etonogestrel-ethinyl estradiol (NUVARING) 0.12-0.015 MG/24HR vaginal ring Place 1 each vaginally every 28 days 3 each 3     Ibuprofen (ADVIL PO)        Allergies   Allergen Reactions     Keflex [Cephalosporins]          Reviewed and updated as needed this visit by Provider         Review of Systems   ROS COMP: Constitutional, HEENT, cardiovascular, pulmonary, gi and gu systems are negative, except as otherwise noted.      Objective    /78 (BP Location: Right arm, Patient Position: Sitting, Cuff Size: Adult Regular)   Pulse 84   Temp 98.9  F (37.2  C) (Oral)   Ht 1.575 m (5' 2\")   Wt 66.2 kg (146 lb)   SpO2 98%   BMI 26.70 kg/m    Body mass index is 26.7 kg/m .  Physical Exam   GENERAL: healthy, alert and no distress  RESP: lungs clear to auscultation - no rales, rhonchi or wheezes  CV: regular rate and rhythm, normal S1 S2, no S3 or S4, no murmur, click or rub, no peripheral edema  ABDOMEN: soft, right mid abdomen with mild tenderness to palpation, no hepatosplenomegaly, no masses and bowel sounds normal  PSYCH: mentation appears normal, affect normal/bright    Results for orders placed or performed in visit on 02/25/20   *UA reflex to Microscopic and Culture ([facility] and [facility] (except [place] and [place])     Status: Abnormal   Result Value Ref Range    Color Urine Yellow     Appearance Urine Clear     Glucose Urine Negative NEG^Negative mg/dL    Bilirubin Urine Negative NEG^Negative    Ketones Urine Negative NEG^Negative mg/dL    Specific Gravity Urine 1.020 1.003 - 1.035    Blood Urine Trace (A) NEG^Negative    pH Urine 6.5 5.0 - 7.0 pH    Protein Albumin Urine Negative NEG^Negative mg/dL    Urobilinogen Urine 0.2 0.2 - 1.0 EU/dL    Nitrite Urine Negative NEG^Negative    Leukocyte Esterase Urine Negative NEG^Negative    Source Midstream Urine    HCG qualitative urine - CSC and Range     Status: None   Result Value Ref Range    HCG Qual " Urine Negative NEG^Negative   Urine Microscopic     Status: Abnormal   Result Value Ref Range    WBC Urine 0 - 5 OTO5^0 - 5 /HPF    RBC Urine 2-5 (A) OTO2^O - 2 /HPF    Squamous Epithelial /LPF Urine Few FEW^Few /LPF    Bacteria Urine Few (A) NEG^Negative /HPF             Assessment & Plan     Cassandra was seen today for flank pain.    Diagnoses and all orders for this visit:    Flank pain  Hematuria, unspecified type  Evaluate for nephrolithiasis with abdominal ultrasound, results pending.    Acetaminophen as needed for pain.   Increase fluid intake.    -     *UA reflex to Microscopic and Culture ([place] [facility] (except [place] and [place]):  Not suggestive of infection.    -     HCG qualitative urine - CSC and Range  -     Urine Microscopic  -     US Abdomen Limited; Future        Return in about 1 week (around 3/3/2020).    Loretta Lamb, GABRIELLA [facility]

## 2020-02-25 NOTE — RESULT ENCOUNTER NOTE
Reviewed results via telephone.      Loretta Lamb, GABRIELLA, CNP  [place] [facility]    If you have further questions about the interpretation of your labs, [email or web address] is a good website to check out for further information.

## 2020-02-26 ENCOUNTER — MYC MEDICAL ADVICE (OUTPATIENT)
Dept: FAMILY MEDICINE | Facility: CLINIC | Age: 30
End: 2020-02-26

## 2020-02-26 ENCOUNTER — HOSPITAL ENCOUNTER (OUTPATIENT)
Dept: CT IMAGING | Facility: CLINIC | Age: 30
Discharge: HOME | End: 2020-02-26
Attending: PHYSICIAN ASSISTANT | Admitting: PHYSICIAN ASSISTANT
Payer: COMMERCIAL

## 2020-02-26 ENCOUNTER — OFFICE VISIT (OUTPATIENT)
Dept: PEDIATRICS | Facility: CLINIC | Age: 30
End: 2020-02-26
Attending: PHYSICIAN ASSISTANT
Payer: COMMERCIAL

## 2020-02-26 ENCOUNTER — OFFICE VISIT (OUTPATIENT)
Dept: FAMILY MEDICINE | Facility: CLINIC | Age: 30
End: 2020-02-26
Payer: COMMERCIAL

## 2020-02-26 VITALS
HEART RATE: 80 BPM | HEIGHT: 62 IN | SYSTOLIC BLOOD PRESSURE: 127 MMHG | DIASTOLIC BLOOD PRESSURE: 85 MMHG | WEIGHT: 146 LBS | TEMPERATURE: 97.6 F | OXYGEN SATURATION: 99 % | RESPIRATION RATE: 16 BRPM | BODY MASS INDEX: 26.87 KG/M2

## 2020-02-26 VITALS
WEIGHT: 146 LBS | OXYGEN SATURATION: 99 % | HEART RATE: 86 BPM | DIASTOLIC BLOOD PRESSURE: 80 MMHG | SYSTOLIC BLOOD PRESSURE: 114 MMHG | HEIGHT: 62 IN | TEMPERATURE: 98.6 F | BODY MASS INDEX: 26.87 KG/M2

## 2020-02-26 DIAGNOSIS — R31.29 MICROSCOPIC HEMATURIA: ICD-10-CM

## 2020-02-26 DIAGNOSIS — R10.9 FLANK PAIN: ICD-10-CM

## 2020-02-26 DIAGNOSIS — R10.9 FLANK PAIN: Primary | ICD-10-CM

## 2020-02-26 DIAGNOSIS — N20.0 KIDNEY STONE ON RIGHT SIDE: Primary | ICD-10-CM

## 2020-02-26 LAB
ALBUMIN SERPL-MCNC: 4.2 G/DL (ref 3.4–5)
ALBUMIN UR-MCNC: NEGATIVE MG/DL
ALP SERPL-CCNC: 46 U/L (ref 40–150)
ALT SERPL W P-5'-P-CCNC: 35 U/L (ref 0–50)
ANION GAP SERPL CALCULATED.3IONS-SCNC: 5 MMOL/L (ref 3–14)
APPEARANCE UR: CLEAR
AST SERPL W P-5'-P-CCNC: 19 U/L (ref 0–45)
BACTERIA #/AREA URNS HPF: ABNORMAL /HPF
BASOPHILS # BLD AUTO: 0.1 10E9/L (ref 0–0.2)
BASOPHILS NFR BLD AUTO: 0.4 %
BILIRUB SERPL-MCNC: 0.3 MG/DL (ref 0.2–1.3)
BILIRUB UR QL STRIP: NEGATIVE
BUN SERPL-MCNC: 10 MG/DL (ref 7–30)
CALCIUM SERPL-MCNC: 9.6 MG/DL (ref 8.5–10.1)
CHLORIDE SERPL-SCNC: 105 MMOL/L (ref 94–109)
CO2 SERPL-SCNC: 25 MMOL/L (ref 20–32)
COLOR UR AUTO: YELLOW
CREAT SERPL-MCNC: 0.84 MG/DL (ref 0.52–1.04)
DIFFERENTIAL METHOD BLD: ABNORMAL
EOSINOPHIL # BLD AUTO: 0 10E9/L (ref 0–0.7)
EOSINOPHIL NFR BLD AUTO: 0.1 %
ERYTHROCYTE [DISTWIDTH] IN BLOOD BY AUTOMATED COUNT: 13.6 % (ref 10–15)
GFR SERPL CREATININE-BSD FRML MDRD: >90 ML/MIN/{1.73_M2}
GLUCOSE SERPL-MCNC: 95 MG/DL (ref 70–99)
GLUCOSE UR STRIP-MCNC: NEGATIVE MG/DL
HCT VFR BLD AUTO: 43.1 % (ref 35–47)
HGB BLD-MCNC: 13.3 G/DL (ref 11.7–15.7)
HGB UR QL STRIP: ABNORMAL
IMM GRANULOCYTES # BLD: 0.1 10E9/L (ref 0–0.4)
IMM GRANULOCYTES NFR BLD: 0.4 %
KETONES UR STRIP-MCNC: NEGATIVE MG/DL
LEUKOCYTE ESTERASE UR QL STRIP: NEGATIVE
LYMPHOCYTES # BLD AUTO: 1.8 10E9/L (ref 0.8–5.3)
LYMPHOCYTES NFR BLD AUTO: 13.4 %
MCH RBC QN AUTO: 27.4 PG (ref 26.5–33)
MCHC RBC AUTO-ENTMCNC: 30.9 G/DL (ref 31.5–36.5)
MCV RBC AUTO: 89 FL (ref 78–100)
MONOCYTES # BLD AUTO: 0.9 10E9/L (ref 0–1.3)
MONOCYTES NFR BLD AUTO: 6.5 %
NEUTROPHILS # BLD AUTO: 10.7 10E9/L (ref 1.6–8.3)
NEUTROPHILS NFR BLD AUTO: 79.2 %
NITRATE UR QL: NEGATIVE
NON-SQ EPI CELLS #/AREA URNS LPF: ABNORMAL /LPF
NRBC # BLD AUTO: 0 10*3/UL
NRBC BLD AUTO-RTO: 0 /100
PH UR STRIP: 6 PH (ref 5–7)
PLATELET # BLD AUTO: 356 10E9/L (ref 150–450)
POTASSIUM SERPL-SCNC: 4.2 MMOL/L (ref 3.4–5.3)
PROT SERPL-MCNC: 8.9 G/DL (ref 6.8–8.8)
RBC # BLD AUTO: 4.85 10E12/L (ref 3.8–5.2)
RBC #/AREA URNS AUTO: ABNORMAL /HPF
SODIUM SERPL-SCNC: 135 MMOL/L (ref 133–144)
SOURCE: ABNORMAL
SP GR UR STRIP: 1.01 (ref 1–1.03)
UROBILINOGEN UR STRIP-ACNC: 0.2 EU/DL (ref 0.2–1)
WBC # BLD AUTO: 13.4 10E9/L (ref 4–11)
WBC #/AREA URNS AUTO: ABNORMAL /HPF

## 2020-02-26 PROCEDURE — 87086 URINE CULTURE/COLONY COUNT: CPT | Performed by: PHYSICIAN ASSISTANT

## 2020-02-26 PROCEDURE — 36415 COLL VENOUS BLD VENIPUNCTURE: CPT | Performed by: PHYSICIAN ASSISTANT

## 2020-02-26 PROCEDURE — 85025 COMPLETE CBC W/AUTO DIFF WBC: CPT | Performed by: PHYSICIAN ASSISTANT

## 2020-02-26 PROCEDURE — 74176 CT ABD & PELVIS W/O CONTRAST: CPT

## 2020-02-26 PROCEDURE — 99215 OFFICE O/P EST HI 40 MIN: CPT | Mod: 25 | Performed by: PHYSICIAN ASSISTANT

## 2020-02-26 PROCEDURE — 99207 REFERRAL TO ACUTE AND DIAGNOSTIC SERVICES: CPT | Performed by: PHYSICIAN ASSISTANT

## 2020-02-26 PROCEDURE — 80053 COMPREHEN METABOLIC PANEL: CPT | Performed by: PHYSICIAN ASSISTANT

## 2020-02-26 PROCEDURE — 81001 URINALYSIS AUTO W/SCOPE: CPT | Performed by: PHYSICIAN ASSISTANT

## 2020-02-26 PROCEDURE — 96372 THER/PROPH/DIAG INJ SC/IM: CPT | Performed by: PHYSICIAN ASSISTANT

## 2020-02-26 RX ORDER — TAMSULOSIN HYDROCHLORIDE 0.4 MG/1
0.4 CAPSULE ORAL DAILY
Qty: 30 CAPSULE | Refills: 0 | Status: SHIPPED | OUTPATIENT
Start: 2020-02-26 | End: 2021-02-17

## 2020-02-26 RX ORDER — KETOROLAC TROMETHAMINE 30 MG/ML
30 INJECTION, SOLUTION INTRAMUSCULAR; INTRAVENOUS ONCE
Status: COMPLETED | OUTPATIENT
Start: 2020-02-26 | End: 2020-02-26

## 2020-02-26 RX ORDER — HYDROCODONE BITARTRATE AND ACETAMINOPHEN 5; 325 MG/1; MG/1
1 TABLET ORAL EVERY 6 HOURS PRN
Qty: 18 TABLET | Refills: 0 | Status: SHIPPED | OUTPATIENT
Start: 2020-02-26 | End: 2020-03-04

## 2020-02-26 RX ORDER — ONDANSETRON 4 MG/1
4 TABLET, ORALLY DISINTEGRATING ORAL EVERY 8 HOURS PRN
Qty: 15 TABLET | Refills: 0 | Status: SHIPPED | OUTPATIENT
Start: 2020-02-26 | End: 2021-02-17

## 2020-02-26 RX ADMIN — KETOROLAC TROMETHAMINE 30 MG: 30 INJECTION, SOLUTION INTRAMUSCULAR; INTRAVENOUS at 10:40

## 2020-02-26 ASSESSMENT — MIFFLIN-ST. JEOR
SCORE: 1335.5
SCORE: 1335.5

## 2020-02-26 NOTE — PROGRESS NOTES
SUBJECTIVE:   Cassandra Langley is a 29 year old female who presents to clinic today for the following health issues:    URINARY TRACT SYMPTOMS      Duration: 4 days     Description  frequency    Intensity:  moderate    Accompanying signs and symptoms:  Fever/chills: no   Flank pain YES- right side, it has moved to right above the hip area.   Nausea and vomiting: no   Vaginal symptoms: none  Abdominal/Pelvic Pain: YES- pelvic and abdominal pressure     History  History of frequent UTI's: no   History of kidney stones: no   Sexually Active: YES  Possibility of pregnancy: No    Precipitating or alleviating factors: None    Therapies tried and outcome: Tylenol   Outcome: not effective       Pt is having intermittent severe right flank pain since 4 days ago. Today her right flank pain  Is constant and uncomfortable. The pain radiates to her hip. Denies any history of kidney stones. Denies hematuria. Denies known history of ovarian cyst. Denies any chance of pregnancy. She has used Tylenol to treat her symptoms but not helpful. US on 02/25/2020 was unremarkable.        Problem list and histories reviewed & adjusted, as indicated.  Additional history: as documented    Patient Active Problem List   Diagnosis   (none) - all problems resolved or deleted     Past Surgical History:   Procedure Laterality Date     NO HISTORY OF SURGERY         Social History     Tobacco Use     Smoking status: Never Smoker     Smokeless tobacco: Never Used   Substance Use Topics     Alcohol use: Yes     Alcohol/week: 0.0 standard drinks     Comment: Socially, maybe once a month     Family History   Problem Relation Age of Onset     No Known Problems Mother      Hyperlipidemia Father      No Known Problems Maternal Grandmother      No Known Problems Maternal Grandfather      No Known Problems Paternal Grandmother      No Known Problems Paternal Grandfather      No Known Problems Sister      No Known Problems Brother      No Known Problems  "Other          Current Outpatient Medications   Medication Sig Dispense Refill     etonogestrel-ethinyl estradiol (NUVARING) 0.12-0.015 MG/24HR vaginal ring Place 1 each vaginally every 28 days 3 each 3     Ibuprofen (ADVIL PO)        HYDROcodone-acetaminophen (NORCO) 5-325 MG tablet Take 1 tablet by mouth every 6 hours as needed for pain 18 tablet 0     ondansetron (ZOFRAN-ODT) 4 MG ODT tab Take 1 tablet (4 mg) by mouth every 8 hours as needed for nausea 15 tablet 0     tamsulosin (FLOMAX) 0.4 MG capsule Take 1 capsule (0.4 mg) by mouth daily 30 capsule 0     Allergies   Allergen Reactions     Keflex [Cephalosporins]        Reviewed and updated as needed this visit by clinical staff  Tobacco  Allergies  Meds  Problems  Med Hx  Surg Hx  Fam Hx  Soc Hx        Reviewed and updated as needed this visit by Provider  Tobacco  Allergies  Meds  Problems  Med Hx  Surg Hx  Fam Hx         ROS:  Constitutional, HEENT, cardiovascular, pulmonary, GI, , musculoskeletal, neuro, skin, endocrine and psych systems are negative, except as otherwise noted.    This document serves as a record of the services and decisions personally performed and made by Deaj Parkinson PA-C. It was created on her behalf by Whitney Ontiveros, a trained medical scribe. The creation of this document is based on the provider's statements to the medical scribe.  Whitney 9:47 AM February 26, 2020  OBJECTIVE:   /80 (BP Location: Left arm, Cuff Size: Adult Regular)   Pulse 86   Temp 98.6  F (37  C) (Oral)   Ht 1.575 m (5' 2\")   Wt 66.2 kg (146 lb)   SpO2 99%   BMI 26.70 kg/m   Body mass index is 26.7 kg/m .    Physical Exam  GENERAL: healthy, alert, visibly uncomfortable and unable to sit still secondary to pain  EYES: Eyes grossly normal to inspection, PERRL and conjunctivae and sclerae normal  ABDOMEN: Right flank tenderness. RLQ tenderness. Otherwise,  soft, no hepatosplenomegaly, no masses and bowel sounds normal  MS: no gross " musculoskeletal defects noted, no edema  SKIN: no suspicious lesions or rashes  NEURO: Normal strength and tone, mentation intact and speech normal  PSYCH: mentation appears normal, affect normal/bright  MS: no gross musculoskeletal defects noted, no edema.  Diagnostic Test Results:  Results for orders placed or performed in visit on 02/26/20   UA with Microscopic     Status: Abnormal   Result Value Ref Range    Color Urine Yellow     Appearance Urine Clear     Glucose Urine Negative NEG^Negative mg/dL    Bilirubin Urine Negative NEG^Negative    Ketones Urine Negative NEG^Negative mg/dL    Specific Gravity Urine 1.015 1.003 - 1.035    pH Urine 6.0 5.0 - 7.0 pH    Protein Albumin Urine Negative NEG^Negative mg/dL    Urobilinogen Urine 0.2 0.2 - 1.0 EU/dL    Nitrite Urine Negative NEG^Negative    Blood Urine Trace (A) NEG^Negative    Leukocyte Esterase Urine Negative NEG^Negative    Source Midstream Urine     WBC Urine 0 - 5 OTO5^0 - 5 /HPF    RBC Urine O - 2 OTO2^O - 2 /HPF    Squamous Epithelial /LPF Urine Few FEW^Few /LPF    Bacteria Urine Few (A) NEG^Negative /HPF   Urine Culture Aerobic Bacterial     Status: None   Result Value Ref Range    Specimen Description Midstream Urine     Culture Micro <10,000 colonies/mL  urogenital zurdo        Recent Results (from the past 744 hour(s))   US Abdomen Limited    Narrative    ULTRASOUND ABDOMEN LIMITED   2/25/2020 11:21 AM     HISTORY:  Right flank pain. Hematuria, unspecified type.    COMPARISON: None.    FINDINGS:    Gallbladder: Normal with no cholelithiasis, wall thickening or focal  tenderness.      Bile ducts:  CHD is normal diameter.  No intrahepatic biliary  dilatation.    Liver: Normal.     Pancreas:  Partially obscured by overlying bowel gas,  but grossly  unremarkable.     Right kidney:  Normal.     Aorta and IVC:  Not specifically assessed.        Impression    IMPRESSION:  No acute abnormality is seen.    TRINI LOMAX MD         ASSESSMENT/PLAN:   Cassandra  was seen today for urinary problem.    Diagnoses and all orders for this visit:    Back pain  Flank pain  Microscopic hematuria  Significantly smptomatic. UA unremarkable and urine culture pending.  US yesterday unremarkable. Suspect likely d/t renal calculi. Provided Toradol injection in clinic today to alleviate pain, pt tolerated well. Immediate transfer to the acute and diagnostic center for further evaluation.  -     Urine Culture Aerobic Bacterial  -     UA with Microscopic  -     Referral to Acute and Diagnostic Services (Day of diagnostic / First order acute); Future        -    ketorolac (TORADOL) injection 30 mg        Transfer to Acute and Diagnostic Services  I have contacted the staff at the [facility] at [phone] to confirm patient acceptance.  Special Needs: None    Discussed transition to Acute & Diagnostic Services Clinic, and patient agrees with next level of care.  Patient transportation will be provided by the patient.      Return in about 1 day (around 2/27/2020) for ADS .     [address] [address]  [email or web address]   Office: [phone]           Deja Parkinson MS, PA-C

## 2020-02-26 NOTE — PATIENT INSTRUCTIONS
Patient Education     Treating Kidney Stones: Expectant Therapy    Most kidney stones are about the size of a grape seed. Stones of this size are small enough to pass naturally. Once it is passed, a stone can be analyzed. This wait-and-see approach is called expectant therapy. Small stones can often be passed with expectant therapy. If pain is a problem, ask your healthcare provider about pain medicines. Then follow his or her directions on how much water to drink. Drinking more water creates more urine to flush out your stone. Also be sure to strain your urine. Take any stones you pass to your provider for analysis.  Drink lots of water  Drinking lots of water may help your stone pass. Water also dilutes the chemicals in your urine. This reduces your risk of forming new stones. You may be told to drink 8, 12-ounce glasses of water a day. Avoid liquids that dehydrate you, such as those containing caffeine or alcohol.  Strain your urine  Straining your urine lets you collect your stone for analysis. Use the strainer each time you urinate. Strain your urine for as long as your healthcare provider suggests. Watch for brown, tan, gold, or black specks or tiny kane. These may be kidney stones.  Take your medicine  Your healthcare provider may give you medicine that makes it more likely for you to pass the kidney stone.   Follow up with your healthcare provider  Follow up by taking any stones you find to your provider for analysis. The type of stone you have determines your diet and prevention program. You may need more tests in the future. These tests will ensure that new stones are not forming.  Date Last Reviewed: 1/1/2017 2000-2019 [employer]. [address] [place] [address], [place] [address]. All rights reserved. This information is not intended as a substitute for professional medical care. Always follow your healthcare professional's instructions.

## 2020-02-26 NOTE — PROGRESS NOTES
Patient presents with:  Back Pain  Abdominal Pain      SUBJECTIVE  HPI:  Cassandra Langley is a 29 year old female who was referred to the ADS by her primary clinic for right sided flank pain.  Onset of pain was on 2/23/20.  She described the pain as sharp at a 10/10, lasting for about 30 minutes and then completely resolving.  Pain has recurred every day, each day more severe than the next with extended duration. Pain today was onset upon waking and has progressively worsened and also radiates into the right low back and hip.      She denies any blood in her urine or dysuria or urinary frequency or urgency.     LMP 1/31/20 and she uses nuvaring.      Denies any h/o trauma or abdominal pain or fevers.    Also denies any URI symptoms.      Urine HCG at PCP clinic was negative today and UA reveals trace blood and bacteria.  Culture was ordered.        SH: patient has a trip planned to [place] this coming weekend.    Past Medical History:   Diagnosis Date     IUD (intrauterine device) in place 8/23/2016-11/2019    vikash     Vulvovaginitis      Current Outpatient Medications   Medication Sig Dispense Refill     etonogestrel-ethinyl estradiol (NUVARING) 0.12-0.015 MG/24HR vaginal ring Place 1 each vaginally every 28 days 3 each 3     Ibuprofen (ADVIL PO)        Social History     Tobacco Use     Smoking status: Never Smoker     Smokeless tobacco: Never Used   Substance Use Topics     Alcohol use: Yes     Alcohol/week: 0.0 standard drinks     Comment: Socially, maybe once a month       ROS:  CONSTITUTIONAL:NEGATIVE for fever, chills, change in weight  INTEGUMENTARY/SKIN: NEGATIVE for worrisome rashes, moles or lesions  EYES: NEGATIVE for vision changes or irritation  ENT/MOUTH: NEGATIVE for ear, mouth and throat problems  RESP:NEGATIVE for significant cough or SOB  CV: NEGATIVE for chest pain, palpitations or peripheral edema  GI: NEGATIVE for nausea, abdominal pain, heartburn, or change in bowel habits  : as per  "HPI  MUSCULOSKELETAL: as per HPI  NEURO: NEGATIVE for weakness, dizziness or paresthesias  HEME/ALLERGY/IMMUNE: NEGATIVE for bleeding problems  Review of systems negative except as stated above.    OBJECTIVE:  /85   Pulse 80   Temp 97.6  F (36.4  C) (Oral)   Resp 16   Ht 1.575 m (5' 2\")   Wt 66.2 kg (146 lb)   SpO2 99%   BMI 26.70 kg/m    GENERAL APPEARANCE: healthy, alert and no distress  EYES: EOMI,  PERRL, conjunctiva clear  HENT: ear canals and TM's normal.  Nose and mouth without ulcers, erythema or lesions  NECK: supple, nontender, no lymphadenopathy  RESP: lungs clear to auscultation - no rales, rhonchi or wheezes  CV: regular rates and rhythm, normal S1 S2, no murmur noted  ABDOMEN:  soft, nontender, no HSM or masses and bowel sounds normal  BACK: right sided CVAT  NEURO: Normal strength and tone, sensory exam grossly normal,  normal speech and mentation  SKIN: no suspicious lesions or rashes      Results for orders placed or performed during the hospital encounter of 02/26/20   CT Abdomen Pelvis w/o Contrast     Status: None (Preliminary result)    Narrative    CT ABDOMEN/PELVIS WITHOUT CONTRAST February 26, 2020 12:33 PM    CLINICAL HISTORY: Flank pain, stone disease suspected - right; flank  pain intermittent since Sunday, now constant.  Microscopic hematuria.  I would like to order without contrast, with the option to add a  contrast CT if the non contrast CT is negative. Flank pain.  Microscopic hematuria.    TECHNIQUE: CT scan of the abdomen and pelvis was performed without IV  contrast. Multiplanar reformats were obtained. Dose reduction  techniques were used.    CONTRAST: None.    COMPARISON: None.    FINDINGS:   LOWER CHEST: Normal.    HEPATOBILIARY: Normal.    PANCREAS: Normal.    SPLEEN: Normal.    ADRENAL GLANDS: Normal.    KIDNEYS/BLADDER: 0.2 cm distal right ureter stone just at the  ureterovesical junction series 4 image 184. Mild right hydronephrosis.  Mild right renal edema. 0.1 " cm stone within the mid right kidney  series 6 image 75.    BOWEL: Normal.    LYMPH NODES: Normal.    VASCULATURE: Unremarkable.    PELVIC ORGANS: Normal.    OTHER: None.    MUSCULOSKELETAL: Normal.      Impression    IMPRESSION:   1.  0.2 cm distal right ureter stone at the ureterovesical junction.  Moderate right hydronephrosis.  2.  Small nonobstructing stone within the right kidney.   Results for orders placed or performed in visit on 02/26/20   CBC with platelets differential     Status: Abnormal   Result Value Ref Range    WBC 13.4 (H) 4.0 - 11.0 10e9/L    RBC Count 4.85 3.8 - 5.2 10e12/L    Hemoglobin 13.3 11.7 - 15.7 g/dL    Hematocrit 43.1 35.0 - 47.0 %    MCV 89 78 - 100 fl    MCH 27.4 26.5 - 33.0 pg    MCHC 30.9 (L) 31.5 - 36.5 g/dL    RDW 13.6 10.0 - 15.0 %    Platelet Count 356 150 - 450 10e9/L    Diff Method Automated Method     % Neutrophils 79.2 %    % Lymphocytes 13.4 %    % Monocytes 6.5 %    % Eosinophils 0.1 %    % Basophils 0.4 %    % Immature Granulocytes 0.4 %    Nucleated RBCs 0 0 /100    Absolute Neutrophil 10.7 (H) 1.6 - 8.3 10e9/L    Absolute Lymphocytes 1.8 0.8 - 5.3 10e9/L    Absolute Monocytes 0.9 0.0 - 1.3 10e9/L    Absolute Eosinophils 0.0 0.0 - 0.7 10e9/L    Absolute Basophils 0.1 0.0 - 0.2 10e9/L    Abs Immature Granulocytes 0.1 0 - 0.4 10e9/L    Absolute Nucleated RBC 0.0    Comprehensive metabolic panel     Status: Abnormal   Result Value Ref Range    Sodium 135 133 - 144 mmol/L    Potassium 4.2 3.4 - 5.3 mmol/L    Chloride 105 94 - 109 mmol/L    Carbon Dioxide 25 20 - 32 mmol/L    Anion Gap 5 3 - 14 mmol/L    Glucose 95 70 - 99 mg/dL    Urea Nitrogen 10 7 - 30 mg/dL    Creatinine 0.84 0.52 - 1.04 mg/dL    GFR Estimate >90 >60 mL/min/[1.73_m2]    GFR Estimate If Black >90 >60 mL/min/[1.73_m2]    Calcium 9.6 8.5 - 10.1 mg/dL    Bilirubin Total 0.3 0.2 - 1.3 mg/dL    Albumin 4.2 3.4 - 5.0 g/dL    Protein Total 8.9 (H) 6.8 - 8.8 g/dL    Alkaline Phosphatase 46 40 - 150 U/L    ALT 35  0 - 50 U/L    AST 19 0 - 45 U/L   Results for orders placed or performed in visit on 02/26/20   UA with Microscopic     Status: Abnormal   Result Value Ref Range    Color Urine Yellow     Appearance Urine Clear     Glucose Urine Negative NEG^Negative mg/dL    Bilirubin Urine Negative NEG^Negative    Ketones Urine Negative NEG^Negative mg/dL    Specific Gravity Urine 1.015 1.003 - 1.035    pH Urine 6.0 5.0 - 7.0 pH    Protein Albumin Urine Negative NEG^Negative mg/dL    Urobilinogen Urine 0.2 0.2 - 1.0 EU/dL    Nitrite Urine Negative NEG^Negative    Blood Urine Trace (A) NEG^Negative    Leukocyte Esterase Urine Negative NEG^Negative    Source Midstream Urine     WBC Urine 0 - 5 OTO5^0 - 5 /HPF    RBC Urine O - 2 OTO2^O - 2 /HPF    Squamous Epithelial /LPF Urine Few FEW^Few /LPF    Bacteria Urine Few (A) NEG^Negative /HPF     CT-scan of abdomen and pelvis reveals:    IMPRESSION:   1.  0.2 cm distal right ureter stone at the ureterovesical junction.  Moderate right hydronephrosis.  2.  Small nonobstructing stone within the right kidney      (N20.0) Kidney stone on right side  (primary encounter diagnosis)  Comment:   Plan: tamsulosin (FLOMAX) 0.4 MG capsule,         HYDROcodone-acetaminophen (NORCO) 5-325 MG         tablet, ondansetron (ZOFRAN-ODT) 4 MG ODT tab            (R10.9) Flank pain  Comment:   Plan: sodium chloride (PF) 0.9% PF flush 3 mL, CBC         with platelets differential, Comprehensive         metabolic panel, CANCELED: CT Abdomen Pelvis         w/o & w Contrast            (R31.29) Microscopic hematuria  Comment:   Plan: sodium chloride (PF) 0.9% PF flush 3 mL, CBC         with platelets differential, Comprehensive         metabolic panel, CANCELED: CT Abdomen Pelvis         w/o & w Contrast          Follow up with primary clinic for re-check in one week, sooner should symptoms persist or worsen.      Patient has asked if she will be able to travel for her weekend trip.  I don't advise travel unless  all symptoms have resolved, meaning until she has captured the stone after it has passed.

## 2020-02-26 NOTE — NURSING NOTE
Attempted to place 20 G peripheral IV in both rt and lt antecubital space without success, no hematoma  Lab staff here and collected blood sample for CBC, CMP

## 2020-02-26 NOTE — TELEPHONE ENCOUNTER
MyChart reply sent to Cassandra. Will also route to GABRIELLA Vivar CNP, to further review for any other recommendations. Snehal Orona R.N.

## 2020-02-26 NOTE — LETTER
[facility]  [address]  [address]  [facility] [id number] [phone]      February 26, 2020    RE:  Cassandra Langley                                                                                                                                                       [address] [facility] [address]            To whom it may concern:    Cassandra Langley was seen in clinic today for a kidney stone.  She may return to work on Monday, so long as her symptoms have resolved.            Sincerely,        Abeba Duenas PA-C    [facility]

## 2020-02-27 LAB
BACTERIA SPEC CULT: NORMAL
SPECIMEN SOURCE: NORMAL

## 2020-02-27 NOTE — RESULT ENCOUNTER NOTE
Cassandra  I have reviewed your recent labs. Here are the results:    -Urine culture is normal.  There is no need for antibiotics at this point.  If new, worsening or persistent symptoms occur, then you should call or return for a recheck.      If you have any questions please do not hesitate to contact our office via phone ([phone]) or MyChart.    Deja Parkinson, MS, PA-C  [facility]3] - [place]

## 2020-02-27 NOTE — TELEPHONE ENCOUNTER
Returned call to patient.  Patient was seen in [facility] and received CT scan at [facility].  - Wayne, CNP

## 2020-03-02 ENCOUNTER — MYC REFILL (OUTPATIENT)
Dept: OBGYN | Facility: CLINIC | Age: 30
End: 2020-03-02

## 2020-03-02 ENCOUNTER — TELEPHONE (OUTPATIENT)
Dept: PEDIATRICS | Facility: CLINIC | Age: 30
End: 2020-03-02

## 2020-03-02 DIAGNOSIS — Z30.49 ENCOUNTER FOR SURVEILLANCE OF NUVARING: ICD-10-CM

## 2020-03-02 RX ORDER — ETONOGESTREL AND ETHINYL ESTRADIOL VAGINAL RING .015; .12 MG/D; MG/D
1 RING VAGINAL
Qty: 3 EACH | Refills: 3 | OUTPATIENT
Start: 2020-03-02

## 2020-03-02 NOTE — TELEPHONE ENCOUNTER
"Requested Prescriptions   Pending Prescriptions Disp Refills     etonogestrel-ethinyl estradiol (NUVARING) 0.12-0.015 MG/24HR vaginal ring 3 each 3     Sig: Place 1 each vaginally every 28 days       Contraceptives Protocol Passed - 3/2/2020  8:18 AM        Passed - Patient is not a current smoker if age is 35 or older        Passed - Recent (12 mo) or future (30 days) visit within the authorizing provider's specialty     Patient has had an office visit with the authorizing provider or a provider within the authorizing providers department within the previous 12 mos or has a future within next 30 days. See \"Patient Info\" tab in inbasket, or \"Choose Columns\" in Meds & Orders section of the refill encounter.              Passed - Medication is active on med list        Passed - No active pregnancy on record        Passed - No positive pregnancy test in past 12 months        Refills available  Geri Sanford RN on 3/2/2020 at 8:27 AM    "

## 2020-03-02 NOTE — TELEPHONE ENCOUNTER
Pt calling asking for a work note to work from home for 1 week (starting 3/2) while she is still straining her urine for kidney stone.     Letter t'd up, please review and advise.     Please e-mail letter to patient at [email or web address]    Jadiel Lao , DYLON  03/02/20 8:26 AM

## 2020-03-02 NOTE — LETTER
March 2, 2020        Cassandra Langley  [address]  [place] [address]          To whom it may concern:    RE: Cassandra Langley    Patient was seen and treated at our clinic on 2/26/2020.  Due to persistent symptoms, she should be allowed to work remotely from home for an additional week.  This would be through 3/9/2020.    Please contact me for questions or concerns.      Sincerely,      Deja Parkinson MBA, MS, PA-C

## 2020-03-03 NOTE — PROGRESS NOTES
SUBJECTIVE:   Cassandra Langley is a 29 year old female who presents to clinic today for the following health issues:    ED/UC Followup:    [facility]   Date of visit: 2/26/2020  Reason for visit: Kidney Stone   Current Status: Patient isn't sure if she has passed the stone or not, she was having pain consistently until Saturday 2/29/2020. But hasn't had pain since.      Pt was seen at the acute and diagnostic center for ongoing right flank pain since 02/23/20. CT of her abdomen showed small nonobstructing stone within the right ureter with mild hydronephrosis. Pt has not had any pain since 02/29/2020. Denies hematuria. Denies any fevers. Denies any right flank pain today. She is compliant on her Flomax. She was able to pass her kidney stone while leaving her urine sample in clinic today.     Problem list and histories reviewed & adjusted, as indicated.  Additional history: as documented    Patient Active Problem List   Diagnosis   (none) - all problems resolved or deleted     Past Surgical History:   Procedure Laterality Date     NO HISTORY OF SURGERY         Social History     Tobacco Use     Smoking status: Never Smoker     Smokeless tobacco: Never Used   Substance Use Topics     Alcohol use: Yes     Alcohol/week: 0.0 standard drinks     Comment: Socially, maybe once a month     Family History   Problem Relation Age of Onset     No Known Problems Mother      Hyperlipidemia Father      No Known Problems Maternal Grandmother      No Known Problems Maternal Grandfather      No Known Problems Paternal Grandmother      No Known Problems Paternal Grandfather      No Known Problems Sister      No Known Problems Brother      No Known Problems Other          Current Outpatient Medications   Medication Sig Dispense Refill     etonogestrel-ethinyl estradiol (NUVARING) 0.12-0.015 MG/24HR vaginal ring Place 1 each vaginally every 28 days 3 each 3     Ibuprofen (ADVIL PO)         "tamsulosin (FLOMAX) 0.4 MG capsule Take 1 capsule (0.4 mg) by mouth daily 30 capsule 0     ondansetron (ZOFRAN-ODT) 4 MG ODT tab Take 1 tablet (4 mg) by mouth every 8 hours as needed for nausea (Patient not taking: Reported on 3/4/2020) 15 tablet 0     Allergies   Allergen Reactions     Keflex [Cephalosporins]        Reviewed and updated as needed this visit by clinical staff  Tobacco  Allergies  Meds  Problems  Med Hx  Surg Hx  Fam Hx  Soc Hx        Reviewed and updated as needed this visit by Provider  Tobacco  Allergies  Meds  Problems  Med Hx  Surg Hx  Fam Hx         ROS:  Constitutional, HEENT, cardiovascular, pulmonary, GI, , musculoskeletal, neuro, skin, endocrine and psych systems are negative, except as otherwise noted.    This document serves as a record of the services and decisions personally performed and made by Deja Parkinson PA-C. It was created on her behalf by Whitney Ontiveros, a trained medical scribe. The creation of this document is based on the provider's statements to the medical scribe.  Whitney Ontiveros 6:50 PM March 2, 2020  OBJECTIVE:   /62 (BP Location: Right arm, Cuff Size: Adult Regular)   Pulse 94   Temp 98.4  F (36.9  C) (Oral)   Ht 1.575 m (5' 2\")   Wt 65.8 kg (145 lb)   LMP 02/27/2020 (Exact Date)   SpO2 100%   Breastfeeding No   BMI 26.52 kg/m   Body mass index is 26.52 kg/m .    Physical exam  GENERAL: healthy, alert and no distress  EYES: Eyes grossly normal to inspection,and conjunctivae and sclerae normal  RESP: lungs clear to auscultation - no rales, rhonchi or wheezes  CV: regular rate and rhythm, normal S1 S2, no S3 or S4, no murmur, click or rub, no peripheral edema and peripheral pulses strong  ABDOMEN: soft, nontender, no hepatosplenomegaly, no masses and bowel sounds normal  MS: no gross musculoskeletal defects noted, no edema  SKIN: no suspicious lesions or rashes  NEURO: Normal strength and tone, mentation intact and speech normal  PSYCH: mentation " appears normal, affect normal/bright      Diagnostic Test Results:  Results for orders placed or performed in visit on 03/04/20 (from the past 24 hour(s))   UA with Microscopic   Result Value Ref Range    Color Urine Yellow     Appearance Urine Clear     Glucose Urine Negative NEG^Negative mg/dL    Bilirubin Urine Negative NEG^Negative    Ketones Urine Negative NEG^Negative mg/dL    Specific Gravity Urine 1.010 1.003 - 1.035    pH Urine 6.5 5.0 - 7.0 pH    Protein Albumin Urine Negative NEG^Negative mg/dL    Urobilinogen Urine 0.2 0.2 - 1.0 EU/dL    Nitrite Urine Negative NEG^Negative    Blood Urine Negative NEG^Negative    Leukocyte Esterase Urine Negative NEG^Negative    Source Midstream Urine     WBC Urine 0 - 5 OTO5^0 - 5 /HPF    RBC Urine O - 2 OTO2^O - 2 /HPF    Squamous Epithelial /LPF Urine Few FEW^Few /LPF     ASSESSMENT/PLAN:   Cassandra was seen today for recheck.    Diagnoses and all orders for this visit:    Nephrolithiasis  Flank pain  Stable. Jamie was able to pass her kidney stone in clinic today.  UA clear, UC pending.  WIll do stone analysis as well.  Discontinue Flomax. Stay well hydrated. Kidney stone sent for analysis. Will inform Cassandra regarding results.  -     UA with Microscopic  -     Urine Culture Aerobic Bacterial         -     Stone analysis      Return in about 8 months (around 11/1/2020) for Physical Exam, Lab Work.     [address] [address]  [email or web address]   Office: [phone]           Deja Parkinson MS, PAJAMEEL

## 2020-03-04 ENCOUNTER — OFFICE VISIT (OUTPATIENT)
Dept: FAMILY MEDICINE | Facility: CLINIC | Age: 30
End: 2020-03-04
Payer: COMMERCIAL

## 2020-03-04 VITALS
TEMPERATURE: 98.4 F | BODY MASS INDEX: 26.68 KG/M2 | WEIGHT: 145 LBS | HEART RATE: 94 BPM | SYSTOLIC BLOOD PRESSURE: 112 MMHG | OXYGEN SATURATION: 100 % | DIASTOLIC BLOOD PRESSURE: 62 MMHG | HEIGHT: 62 IN

## 2020-03-04 DIAGNOSIS — R10.9 FLANK PAIN: ICD-10-CM

## 2020-03-04 DIAGNOSIS — N20.0 NEPHROLITHIASIS: Primary | ICD-10-CM

## 2020-03-04 LAB
ALBUMIN UR-MCNC: NEGATIVE MG/DL
APPEARANCE UR: CLEAR
BILIRUB UR QL STRIP: NEGATIVE
COLOR UR AUTO: YELLOW
GLUCOSE UR STRIP-MCNC: NEGATIVE MG/DL
HGB UR QL STRIP: NEGATIVE
KETONES UR STRIP-MCNC: NEGATIVE MG/DL
LEUKOCYTE ESTERASE UR QL STRIP: NEGATIVE
NITRATE UR QL: NEGATIVE
NON-SQ EPI CELLS #/AREA URNS LPF: NORMAL /LPF
PH UR STRIP: 6.5 PH (ref 5–7)
RBC #/AREA URNS AUTO: NORMAL /HPF
SOURCE: NORMAL
SP GR UR STRIP: 1.01 (ref 1–1.03)
UROBILINOGEN UR STRIP-ACNC: 0.2 EU/DL (ref 0.2–1)
WBC #/AREA URNS AUTO: NORMAL /HPF

## 2020-03-04 PROCEDURE — 87086 URINE CULTURE/COLONY COUNT: CPT | Performed by: PHYSICIAN ASSISTANT

## 2020-03-04 PROCEDURE — 82365 CALCULUS SPECTROSCOPY: CPT | Mod: 90 | Performed by: NURSE PRACTITIONER

## 2020-03-04 PROCEDURE — 81001 URINALYSIS AUTO W/SCOPE: CPT | Performed by: PHYSICIAN ASSISTANT

## 2020-03-04 PROCEDURE — 99000 SPECIMEN HANDLING OFFICE-LAB: CPT | Performed by: NURSE PRACTITIONER

## 2020-03-04 PROCEDURE — 99214 OFFICE O/P EST MOD 30 MIN: CPT | Performed by: PHYSICIAN ASSISTANT

## 2020-03-04 ASSESSMENT — MIFFLIN-ST. JEOR: SCORE: 1330.97

## 2020-03-04 NOTE — RESULT ENCOUNTER NOTE
Cassandra  I have reviewed your recent labs. Here are the results:    -Urine is normal and the blood that was present has resolved!  I will keep you apprised of the culture results when they are available.     If you have any questions please do not hesitate to contact our office via phone ([phone]) or MyChart.    Deja Parkinson, MS, PA-C  [facility] [place]

## 2020-03-05 LAB
BACTERIA SPEC CULT: NORMAL
SPECIMEN SOURCE: NORMAL

## 2020-03-05 NOTE — RESULT ENCOUNTER NOTE
Cassandra  I have reviewed your recent labs. Here are the results:    -Urine culture is normal.  There is no need for antibiotics at this point.  If new, worsening or persistent symptoms occur, then you should call or return for a recheck.      If you have any questions please do not hesitate to contact our office via phone ([phone]) or MyChart.    Deja Parkinson, MS, PA-C  [facility] [place]

## 2020-03-06 LAB
APPEARANCE STONE: NORMAL
COMPN STONE: NORMAL
NUMBER STONE: 1
SIZE STONE: NORMAL MM
WT STONE: 5 MG

## 2020-03-06 NOTE — RESULT ENCOUNTER NOTE
Dear Cassandra,     I am happy to see that you were able to pass your stone.      Your stone composition showed:      Calcium oxalate and calcium phosphate composition.        I recommend a focus on increase in water intake and you may benefit from some dietary modifications.      Dietary modification:  increasing the intake of fluid, dietary calcium, potassium, and phytate may be beneficial. In addition, decreasing the intake of oxalate, animal protein, sucrose, fructose, sodium, supplemental vitamin C, and supplemental calcium (as opposed to dietary calcium) may reduce the risk of stones.     Limit dietary oxalate intake - Some foods contain very large amounts of oxalate and those should be avoided (eg, spinach, rhubarb, potatoes). In addition, some nuts and legumes are also high in oxalate and the intake should be limited (eg, peanuts, cashews, and almonds).    Please send a [employer] message or call [phone]  if you have any questions.      GABRIELLA Hung, [facility]    If you have further questions about the interpretation of your labs, [email or web address] is a good website to check out for further information.

## 2021-01-15 ENCOUNTER — HEALTH MAINTENANCE LETTER (OUTPATIENT)
Age: 31
End: 2021-01-15

## 2021-01-19 ENCOUNTER — MEDICAL CORRESPONDENCE (OUTPATIENT)
Dept: HEALTH INFORMATION MANAGEMENT | Facility: CLINIC | Age: 31
End: 2021-01-19
Payer: COMMERCIAL

## 2021-02-17 ENCOUNTER — PRENATAL OFFICE VISIT (OUTPATIENT)
Dept: NURSING | Facility: CLINIC | Age: 31
End: 2021-02-17
Payer: COMMERCIAL

## 2021-02-17 DIAGNOSIS — Z34.00 SUPERVISION OF NORMAL FIRST PREGNANCY: ICD-10-CM

## 2021-02-17 PROCEDURE — 99207 PR NO CHARGE NURSE ONLY: CPT

## 2021-02-17 SDOH — ECONOMIC STABILITY: TRANSPORTATION INSECURITY
IN THE PAST 12 MONTHS, HAS THE LACK OF TRANSPORTATION KEPT YOU FROM MEDICAL APPOINTMENTS OR FROM GETTING MEDICATIONS?: NOT ASKED

## 2021-02-17 SDOH — ECONOMIC STABILITY: INCOME INSECURITY: HOW HARD IS IT FOR YOU TO PAY FOR THE VERY BASICS LIKE FOOD, HOUSING, MEDICAL CARE, AND HEATING?: NOT ASKED

## 2021-02-17 SDOH — ECONOMIC STABILITY: TRANSPORTATION INSECURITY
IN THE PAST 12 MONTHS, HAS LACK OF TRANSPORTATION KEPT YOU FROM MEETINGS, WORK, OR FROM GETTING THINGS NEEDED FOR DAILY LIVING?: NOT ASKED

## 2021-02-17 SDOH — ECONOMIC STABILITY: FOOD INSECURITY: WITHIN THE PAST 12 MONTHS, THE FOOD YOU BOUGHT JUST DIDN'T LAST AND YOU DIDN'T HAVE MONEY TO GET MORE.: NOT ASKED

## 2021-02-17 SDOH — ECONOMIC STABILITY: FOOD INSECURITY: WITHIN THE PAST 12 MONTHS, YOU WORRIED THAT YOUR FOOD WOULD RUN OUT BEFORE YOU GOT MONEY TO BUY MORE.: NOT ASKED

## 2021-02-17 NOTE — PROGRESS NOTES
SUBJECTIVE:     HPI:    This is a 30 year old female patient,  who presents for her first obstetrical visit.    RAMIN: 2021, by Last Menstrual Period.  She is 9w0d weeks.  Her cycles are regular.  Her last menstrual period was normal.   Since her LMP, she has experienced  fatigue and food aversion).   She denies nausea, emesis, abdominal pain, headache, loss of appetite, vaginal discharge, dysuria, pelvic pain, urinary urgency, lightheadedness, urinary frequency, vaginal bleeding, hemorrhoids and constipation.    Additional History: Kidney stones, IUD removed 2020, Lactose intolerant.     Have you travelled during the pregnancy?No  Have your sexual partner(s) travelled during the pregnancy?No      HISTORY:   Planned Pregnancy: Yes  Marital Status:   Occupation: [employer]  for [employer]. Currently works from home.  Living in Household: Spouse and Other:  Parents    Past History:  Her past medical history   Past Medical History:   Diagnosis Date     IUD (intrauterine device) in place 2016-2019    vikash     Vulvovaginitis    .      She has a history of  First pregnancy    Since her last LMP she denies use of alcohol, tobacco and street drugs.    Past medical, surgical, social and family history were reviewed and updated in [employer].        Current Outpatient Medications   Medication     Prenatal Vit-Fe Fum-FA-Omega (PRENATAL MULTI +DHA PO)     No current facility-administered medications for this visit.        ROS:   12 point review of systems negative other than symptoms noted below or in the HPI.  Constitutional: Fatigue  Gastrointestinal: food aversion      OBJECTIVE:     EXAM:  LMP 2020  There is no height or weight on file to calculate BMI.      Nurse phone visit completed. Prenatal book and folder (containing standard educational hand-outs and brochures)  will be given at the next visit to patient. Information in folder reviewed over the phone. Questions answered.  Brochure given on optional screening available to assess chromosomal anomalies. Pt undecided NIPT. Pt advised to call the clinic if she has any questions or concerns related to her pregnancy. Prenatal labs future ordered. New prenatal visit scheduled on 2/25/21 with .      Lab Results   Component Value Date    PAP NIL 11/06/2018     PHQ-9 score:    PHQ 2/16/2021   PHQ-9 Total Score 1   Q9: Thoughts of better off dead/self-harm past 2 weeks Not at all       IVAN-7 SCORE 11/6/2018 11/26/2019 2/16/2021   Total Score - - 0 (minimal anxiety)   Total Score 0 0 0       Patient supplied answers from flow sheet for:  Prenatal OB Questionnaire.       Klaudia Topete RN

## 2021-02-24 NOTE — PROGRESS NOTES
HPI:     This is a 30 year old female patient,  who presents for her first obstetrical visit.     RAMIN: 2021, by Last Menstrual Period.  She is 9w0d weeks.  Her cycles are regular.  Her last menstrual period was normal.   Since her LMP, she has experienced  fatigue and food aversion).   She denies nausea, emesis, abdominal pain, headache, loss of appetite, vaginal discharge, dysuria, pelvic pain, urinary urgency, lightheadedness, urinary frequency, vaginal bleeding, hemorrhoids and constipation.     Additional History: Kidney stones, IUD removed 2020, Lactose intolerant.      Here today for new OB visit!  1st pregnancy.  Had IUD removed with us in 2019 and used nuvaring until October.  Sure LMP and US today confirms dating.  No vb/spotting.  +fatigue but no other issues/concerns.  No nausea.  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  Living in Household: Spouse and Other:  Parents     Past History:  Her past medical history   Past Medical History        Past Medical History:   Diagnosis Date     IUD (intrauterine device) in place 2016-2019     vikash     Vulvovaginitis        .       She has a history of  First pregnancy     Since her last LMP she denies use of alcohol, tobacco and street drugs.     Past medical, surgical, social and family history were reviewed and updated in [employer].               Current Outpatient Medications   Medication     Prenatal Vit-Fe Fum-FA-Omega (PRENATAL MULTI +DHA PO)      No current facility-administered medications for this visit.          ROS:    12 point review of systems negative other than symptoms noted below or in the HPI.  Constitutional: Fatigue  Gastrointestinal: food aversion        OBJECTIVE:      EXAM:  LMP 12/16/2020  There is no height or weight on file to calculate BMI.        Nurse phone visit completed. Prenatal book and folder (containing standard educational hand-outs and brochures)  will be given at the next visit to patient. Information in folder reviewed over the phone. Questions answered. Brochure given on optional screening available to assess chromosomal anomalies. Pt undecided NIPT. Pt advised to call the clinic if she has any questions or concerns related to her pregnancy. Prenatal labs future ordered. New prenatal visit scheduled on 2/25/21 with .              Lab Results   Component Value Date     PAP NIL 11/06/2018      PHQ-9 score:    PHQ 2/16/2021   PHQ-9 Total Score 1   Q9: Thoughts of better off dead/self-harm past 2 weeks Not at all         IVAN-7 SCORE 11/6/2018 11/26/2019 2/16/2021   Total Score - - 0 (minimal anxiety)   Total Score 0 0 0         Patient supplied answers from flow sheet for:  Prenatal OB Questionnaire.        Klaudia Topete RN        OBJECTIVE:     EXAM:  /62   Wt 68.5 kg (151 lb)   LMP 12/16/2020   Breastfeeding No   BMI 27.62 kg/m   Body mass index is 27.62 kg/m .    GENERAL: healthy, alert and no distress  EYES: Eyes grossly normal to inspection, PERRL and conjunctivae and sclerae normal  HENT: ear canals and TM's normal, nose and mouth without ulcers or lesions  NECK: no adenopathy, no asymmetry, masses, or scars and thyroid normal to palpation  RESP: lungs clear to auscultation - no rales, rhonchi or wheezes  BREAST: normal without masses, tenderness or nipple discharge and no palpable axillary masses or adenopathy  CV: regular rate and rhythm, normal S1 S2, no S3 or S4, no murmur, click or rub, no peripheral edema and peripheral pulses strong  ABDOMEN: soft, nontender, no  hepatosplenomegaly, no masses and bowel sounds normal  MS: no gross musculoskeletal defects noted, no edema  SKIN: no suspicious lesions or rashes  NEURO: Normal strength and tone, mentation intact and speech normal  PSYCH: mentation appears normal, affect normal/bright    ASSESSMENT/PLAN:       ICD-10-CM    1. Supervision of normal first pregnancy  Z34.00 *UA reflex to Microscopic     Urine Culture Aerobic Bacterial     Treponema Abs w Reflex to RPR and Titer     Rubella Antibody IgG Quantitative     HIV Antigen Antibody Combo     CBC with platelets     Hepatitis B surface antigen     ABO/Rh type and screen       30 year old , On 2021 patient is 10w1d weeks of pregnancy with RAMIN of 2021, by Last Menstrual Period    Discussed as follows:vaccinations, genetic screening, COVID    Counseling given:   - Follow up in 4-6 weeks for return OB visit.  - Recommended weight gain for pregnancy: 15-25 lbs.      PLAN/PATIENT INSTRUCTIONS:    Patient Instructions   Avoid alcoholic beverages.  Discussed all genetic testing options.  Patient will decide.  Flu vaccine today.  Reviewed usual and expected course of pregnancy including labs, imaging, vaccinations, visits, etc.  RTC 4wks for routine OB visit.       Jes Foley MD

## 2021-02-25 ENCOUNTER — ANCILLARY PROCEDURE (OUTPATIENT)
Dept: ULTRASOUND IMAGING | Facility: CLINIC | Age: 31
End: 2021-02-25
Payer: COMMERCIAL

## 2021-02-25 ENCOUNTER — PRENATAL OFFICE VISIT (OUTPATIENT)
Dept: OBGYN | Facility: CLINIC | Age: 31
End: 2021-02-25
Payer: COMMERCIAL

## 2021-02-25 VITALS — WEIGHT: 151 LBS | SYSTOLIC BLOOD PRESSURE: 122 MMHG | DIASTOLIC BLOOD PRESSURE: 62 MMHG | BODY MASS INDEX: 27.62 KG/M2

## 2021-02-25 DIAGNOSIS — Z34.01 ENCOUNTER FOR SUPERVISION OF NORMAL FIRST PREGNANCY IN FIRST TRIMESTER: ICD-10-CM

## 2021-02-25 DIAGNOSIS — Z34.00 SUPERVISION OF NORMAL FIRST PREGNANCY: ICD-10-CM

## 2021-02-25 DIAGNOSIS — Z23 NEED FOR PROPHYLACTIC VACCINATION AND INOCULATION AGAINST INFLUENZA: Primary | ICD-10-CM

## 2021-02-25 LAB
ABO + RH BLD: NORMAL
ABO + RH BLD: NORMAL
ALBUMIN UR-MCNC: NEGATIVE MG/DL
APPEARANCE UR: CLEAR
BILIRUB UR QL STRIP: NEGATIVE
BLD GP AB SCN SERPL QL: NORMAL
BLOOD BANK CMNT PATIENT-IMP: NORMAL
COLOR UR AUTO: YELLOW
ERYTHROCYTE [DISTWIDTH] IN BLOOD BY AUTOMATED COUNT: 14.6 % (ref 10–15)
GLUCOSE UR STRIP-MCNC: NEGATIVE MG/DL
HCT VFR BLD AUTO: 43.1 % (ref 35–47)
HGB BLD-MCNC: 13.7 G/DL (ref 11.7–15.7)
HGB UR QL STRIP: NEGATIVE
KETONES UR STRIP-MCNC: NEGATIVE MG/DL
LEUKOCYTE ESTERASE UR QL STRIP: NEGATIVE
MCH RBC QN AUTO: 26.9 PG (ref 26.5–33)
MCHC RBC AUTO-ENTMCNC: 31.8 G/DL (ref 31.5–36.5)
MCV RBC AUTO: 85 FL (ref 78–100)
NITRATE UR QL: NEGATIVE
PH UR STRIP: 7 PH (ref 5–7)
PLATELET # BLD AUTO: 302 10E9/L (ref 150–450)
RBC # BLD AUTO: 5.09 10E12/L (ref 3.8–5.2)
SOURCE: NORMAL
SP GR UR STRIP: 1.02 (ref 1–1.03)
SPECIMEN EXP DATE BLD: NORMAL
UROBILINOGEN UR STRIP-ACNC: 0.2 EU/DL (ref 0.2–1)
WBC # BLD AUTO: 8.8 10E9/L (ref 4–11)

## 2021-02-25 PROCEDURE — 90686 IIV4 VACC NO PRSV 0.5 ML IM: CPT | Performed by: OBSTETRICS & GYNECOLOGY

## 2021-02-25 PROCEDURE — 86780 TREPONEMA PALLIDUM: CPT | Mod: 90 | Performed by: OBSTETRICS & GYNECOLOGY

## 2021-02-25 PROCEDURE — 86762 RUBELLA ANTIBODY: CPT | Performed by: OBSTETRICS & GYNECOLOGY

## 2021-02-25 PROCEDURE — 76817 TRANSVAGINAL US OBSTETRIC: CPT

## 2021-02-25 PROCEDURE — 81003 URINALYSIS AUTO W/O SCOPE: CPT | Performed by: OBSTETRICS & GYNECOLOGY

## 2021-02-25 PROCEDURE — 99000 SPECIMEN HANDLING OFFICE-LAB: CPT | Performed by: OBSTETRICS & GYNECOLOGY

## 2021-02-25 PROCEDURE — 86900 BLOOD TYPING SEROLOGIC ABO: CPT | Performed by: OBSTETRICS & GYNECOLOGY

## 2021-02-25 PROCEDURE — 87086 URINE CULTURE/COLONY COUNT: CPT | Performed by: OBSTETRICS & GYNECOLOGY

## 2021-02-25 PROCEDURE — 76801 OB US < 14 WKS SINGLE FETUS: CPT | Performed by: OBSTETRICS & GYNECOLOGY

## 2021-02-25 PROCEDURE — 87340 HEPATITIS B SURFACE AG IA: CPT | Performed by: OBSTETRICS & GYNECOLOGY

## 2021-02-25 PROCEDURE — 99207 PR FIRST OB VISIT: CPT | Performed by: OBSTETRICS & GYNECOLOGY

## 2021-02-25 PROCEDURE — 85027 COMPLETE CBC AUTOMATED: CPT | Performed by: OBSTETRICS & GYNECOLOGY

## 2021-02-25 PROCEDURE — 90471 IMMUNIZATION ADMIN: CPT | Performed by: OBSTETRICS & GYNECOLOGY

## 2021-02-25 PROCEDURE — 36415 COLL VENOUS BLD VENIPUNCTURE: CPT | Performed by: OBSTETRICS & GYNECOLOGY

## 2021-02-25 PROCEDURE — 86901 BLOOD TYPING SEROLOGIC RH(D): CPT | Performed by: OBSTETRICS & GYNECOLOGY

## 2021-02-25 PROCEDURE — 86850 RBC ANTIBODY SCREEN: CPT | Performed by: OBSTETRICS & GYNECOLOGY

## 2021-02-25 PROCEDURE — 87389 HIV-1 AG W/HIV-1&-2 AB AG IA: CPT | Performed by: OBSTETRICS & GYNECOLOGY

## 2021-02-25 RX ORDER — LACTOBACILLUS RHAMNOSUS GG 10B CELL
1 CAPSULE ORAL 2 TIMES DAILY
COMMUNITY
End: 2021-03-25

## 2021-02-25 RX ORDER — CALCIUM POLYCARBOPHIL 625 MG
TABLET ORAL DAILY
COMMUNITY
End: 2021-03-25

## 2021-02-25 NOTE — PATIENT INSTRUCTIONS
Avoid alcoholic beverages.  Discussed all genetic testing options.  Patient will decide.  Flu vaccine today.  Reviewed usual and expected course of pregnancy including labs, imaging, vaccinations, visits, etc.  RTC 4wks for routine OB visit.

## 2021-02-26 LAB
BACTERIA SPEC CULT: NO GROWTH
HBV SURFACE AG SERPL QL IA: NONREACTIVE
HIV 1+2 AB+HIV1 P24 AG SERPL QL IA: NONREACTIVE
Lab: NORMAL
SPECIMEN SOURCE: NORMAL
T PALLIDUM AB SER QL: NONREACTIVE

## 2021-03-01 LAB — RUBV IGG SERPL IA-ACNC: 26 IU/ML

## 2021-03-07 ENCOUNTER — NURSE TRIAGE (OUTPATIENT)
Dept: NURSING | Facility: CLINIC | Age: 31
End: 2021-03-07

## 2021-03-07 ENCOUNTER — TELEPHONE (OUTPATIENT)
Dept: FAMILY MEDICINE | Facility: CLINIC | Age: 31
End: 2021-03-07

## 2021-03-08 NOTE — TELEPHONE ENCOUNTER
Agree.  Keep an eye on spotting.  If continues or is heavier/bright red bleeding, then should be seen.  If continues with light spotting or resolves, will just be seen at next appt

## 2021-03-08 NOTE — TELEPHONE ENCOUNTER
"Pregnant: 11 weeks 4 days. Tonight she had bleeding and cramping. Bleeding began ~ 6pm tonight. Light pink brown, amount was more than a pantiliner, but not quite saturating a full pad. Cramping began ~ 7pm tonight. It stopped about 9 pm. No pain now. T= 98.3 orally during call. Bleeding also seems to have stopped.   OBGYN: [facility] Dr Jes Foley @ [facility] in [place].   Triaged to a disposition of See PCP within 3 days. Call transferred to .     Yani Modi RN Triage Nurse Advisor 10:08 PM 3/7/2021    Additional Information    Negative: Shock suspected (e.g., cold/pale/clammy skin, too weak to stand, low BP, rapid pulse)    Negative: Difficult to awaken or acting confused (e.g., disoriented, slurred speech)    Negative: Passed out (i.e., lost consciousness, collapsed and was not responding)    Negative: Sounds like a life-threatening emergency to the triager    Negative: [1] Vaginal bleeding AND [2] pregnant > 20 weeks    Negative: Not pregnant or pregnancy status unknown    Negative: SEVERE abdominal pain    Negative: [1] SEVERE vaginal bleeding (i.e., soaking 2 pads / hour, large blood clots) AND [2] present 2 or more hours    Negative: SEVERE dizziness (e.g., unable to stand, requires support to walk, feels like passing out)    Negative: [1] MODERATE vaginal bleeding (i.e., soaking 1 pad / hour; clots) AND [2] present > 6 hours    Negative: [1] MODERATE vaginal bleeding (i.e., soaking 1 pad / hour; clots) AND [2] pregnant > 12 weeks    Negative: Passed tissue (e.g., gray-white)    Negative: Shoulder pain    Negative: Pale skin (pallor) of new onset or worsening    Negative: Patient sounds very sick or weak to the triager    Negative: [1] Constant abdominal pain AND [2] present > 2 hours    Negative: Fever > 100.4 F (38.0 C)    Negative: [1] Intermittent lower abdominal pain (e.g., cramping) AND [2] present > 24 hours    Negative: Prior history of \"ectopic pregnancy\" or previous tubal " surgery (e.g., tubal ligation)    Negative: Pain or burning with passing urine (urination)    Negative: MODERATE vaginal bleeding (i.e., soaking 1 pad / hour; clots)    Negative: Has IUD    MILD vaginal bleeding (i.e., less than 1 pad / hour; less than patient's usual menstrual bleeding; not just spotting)    Protocols used: PREGNANCY - VAGINAL BLEEDING LESS THAN 20 WEEKS [facility]  COVID 19 Nurse Triage Plan/Patient Instructions    Please be aware that novel coronavirus (COVID-19) may be circulating in the community. If you develop symptoms such as fever, cough, or SOB or if you have concerns about the presence of another infection including coronavirus (COVID-19), please contact your health care provider or visit [email or web address].     Disposition/Instructions    In-Person Visit with provider recommended. Reference Visit Selection Guide.    Thank you for taking steps to prevent the spread of this virus.  o Limit your contact with others.  o Wear a simple mask to cover your cough.  o Wash your hands well and often.    Resources    [facility]: About COVID-19: [email or web address]    CDC: What to Do If You're Sick: www.cdc.gov/coronavirus/2019-ncov/about/steps-when-sick.html    CDC: Ending Home Isolation: www.cdc.gov/coronavirus/2019-ncov/hcp/disposition-in-home-patients.html     CDC: Caring for Someone: www.cdc.gov/coronavirus/2019-ncov/if-you-are-sick/care-for-someone.html     [facility]: Interim Guidance for Hospital Discharge to Home: www.health.[facility].us/diseases/coronavirus/hcp/hospdischarge.pdf    [facility] clinical trials (COVID-19 research studies): clinicalaffairs.[facility].edu/um-clinical-trials     Below are the COVID-19 hotlines at the [place] [facility] ([facility]). Interpreters are available.   o For health questions: Call 273-892-9490 or 1-946.932.8775 (7 a.m. to 7 p.m.)  o For questions about schools and childcare: Call 161-355-2827 or 1-293.295.7062 (7 a.m. to  7 p.m.)

## 2021-03-08 NOTE — TELEPHONE ENCOUNTER
Intermittent very mild cramping, some brownish/pink tinged spotting yesterday  Now light brownish spotting today, notices mostly when wiping.  No recent IC, straining with BM etc  Discussed light spotting in early pregnancy. Will monitor for now.   Discussed s/s that require timely evaluation.   US showing viable IUP done 2/25  Will consult with Dr. Foley for her recommendations. Pt aware she is out of the office  Geri Sanford RN on 3/8/2021 at 9:39 AM

## 2021-03-25 ENCOUNTER — PRENATAL OFFICE VISIT (OUTPATIENT)
Dept: OBGYN | Facility: CLINIC | Age: 31
End: 2021-03-25
Payer: COMMERCIAL

## 2021-03-25 VITALS — SYSTOLIC BLOOD PRESSURE: 120 MMHG | BODY MASS INDEX: 27.62 KG/M2 | WEIGHT: 151 LBS | DIASTOLIC BLOOD PRESSURE: 78 MMHG

## 2021-03-25 DIAGNOSIS — Z3A.14 14 WEEKS GESTATION OF PREGNANCY: Primary | ICD-10-CM

## 2021-03-25 DIAGNOSIS — Z34.01 ENCOUNTER FOR SUPERVISION OF NORMAL FIRST PREGNANCY IN FIRST TRIMESTER: ICD-10-CM

## 2021-03-25 PROCEDURE — 36415 COLL VENOUS BLD VENIPUNCTURE: CPT | Performed by: OBSTETRICS & GYNECOLOGY

## 2021-03-25 PROCEDURE — 99207 PR PRENATAL VISIT: CPT | Performed by: OBSTETRICS & GYNECOLOGY

## 2021-03-25 PROCEDURE — 99000 SPECIMEN HANDLING OFFICE-LAB: CPT | Performed by: OBSTETRICS & GYNECOLOGY

## 2021-03-25 PROCEDURE — 99N1100 PR STATISTIC VERIFI PRENATAL TRISOMY 21,18,13: Mod: 90 | Performed by: OBSTETRICS & GYNECOLOGY

## 2021-03-25 NOTE — PROGRESS NOTES
Doing well today.    Feeling pretty well  Did have episode of light bleeding earlier this month --has occ brownish discharge with exercise  Would like innatal today  RTC 4wks --AFP at that time and will do anatomy us 2wks later  Interested in COVID vaccine when available

## 2021-03-30 ENCOUNTER — TELEPHONE (OUTPATIENT)
Dept: OBGYN | Facility: CLINIC | Age: 31
End: 2021-03-30

## 2021-03-30 LAB — LAB SCANNED RESULT: NORMAL

## 2021-03-30 NOTE — TELEPHONE ENCOUNTER
Innatal results: Neg  Fetal Fraction: 5 %    TEST RESULT INTERPRETATION   Chromosome 21 No aneuploidy detected  Results consistent with two copies of chromosome 21   Chromosome 18 No aneuploidy detected  Results consistent with two copies of chromosome 18   Chromosome 13 No aneuploidy detected  Results consistent with two copies of chromosome 13   Sex Chromosome No aneuploidy detected  Results consistent with two sex chromosomes:  male     Results received from [facility] testing in [facility] triage.    Testing done:  Innatal Prenatal Screen    Action:  Informed pt of results and gender per her request    Pt verbalized understanding, in agreement with plan, and voiced no further questions.    Geri Sanford RN

## 2021-04-23 ENCOUNTER — PRENATAL OFFICE VISIT (OUTPATIENT)
Dept: OBGYN | Facility: CLINIC | Age: 31
End: 2021-04-23
Payer: COMMERCIAL

## 2021-04-23 VITALS — BODY MASS INDEX: 28.17 KG/M2 | DIASTOLIC BLOOD PRESSURE: 62 MMHG | WEIGHT: 154 LBS | SYSTOLIC BLOOD PRESSURE: 118 MMHG

## 2021-04-23 DIAGNOSIS — Z3A.18 18 WEEKS GESTATION OF PREGNANCY: Primary | ICD-10-CM

## 2021-04-23 DIAGNOSIS — Z34.02 ENCOUNTER FOR SUPERVISION OF NORMAL FIRST PREGNANCY IN SECOND TRIMESTER: ICD-10-CM

## 2021-04-23 PROCEDURE — 99000 SPECIMEN HANDLING OFFICE-LAB: CPT | Performed by: OBSTETRICS & GYNECOLOGY

## 2021-04-23 PROCEDURE — 99207 PR PRENATAL VISIT: CPT | Performed by: OBSTETRICS & GYNECOLOGY

## 2021-04-23 PROCEDURE — 82105 ALPHA-FETOPROTEIN SERUM: CPT | Mod: 90 | Performed by: OBSTETRICS & GYNECOLOGY

## 2021-04-23 PROCEDURE — 36415 COLL VENOUS BLD VENIPUNCTURE: CPT | Performed by: OBSTETRICS & GYNECOLOGY

## 2021-04-23 NOTE — PROGRESS NOTES
Doing well today.  +/-flutters?  No cramping/vb/spotting  No bowel/bladder issues --taking fiber  Overall feeling tired but good  Normal innatal; will check AFP today  RTC 2wks --anatomy us at that time

## 2021-04-25 LAB
# FETUSES US: NORMAL
# FETUSES: 1
AFP ADJ MOM AMN: 1.71
AFP SERPL-MCNC: 75 NG/ML
AGE - REPORTED: 32.4 YR
CURRENT SMOKER: NO
CURRENT SMOKER: NO
DIABETES STATUS PATIENT: NO
EGG DONOR AGE: NO
FAMILY MEMBER DISEASES HX: NO
FAMILY MEMBER DISEASES HX: NO
GA METHOD: NORMAL
GA METHOD: NORMAL
GA: NORMAL WK
IDDM PATIENT QL: NO
INTEGRATED SCN PATIENT-IMP: NORMAL
IVF PREGNANCY: NO
LMP START DATE: NORMAL
MONOCHORIONIC TWINS: NO
SERVICE CMNT-IMP: NO
SPECIMEN DRAWN SERPL: NORMAL
VALPROIC/CARBAMAZEPINE STATUS: NO
WEIGHT UNITS: NORMAL

## 2021-05-07 ENCOUNTER — ANCILLARY PROCEDURE (OUTPATIENT)
Dept: ULTRASOUND IMAGING | Facility: CLINIC | Age: 31
End: 2021-05-07
Attending: OBSTETRICS & GYNECOLOGY
Payer: COMMERCIAL

## 2021-05-07 ENCOUNTER — TRANSCRIBE ORDERS (OUTPATIENT)
Dept: MATERNAL FETAL MEDICINE | Facility: CLINIC | Age: 31
End: 2021-05-07

## 2021-05-07 ENCOUNTER — PRENATAL OFFICE VISIT (OUTPATIENT)
Dept: OBGYN | Facility: CLINIC | Age: 31
End: 2021-05-07
Attending: OBSTETRICS & GYNECOLOGY
Payer: COMMERCIAL

## 2021-05-07 VITALS — DIASTOLIC BLOOD PRESSURE: 68 MMHG | SYSTOLIC BLOOD PRESSURE: 122 MMHG | WEIGHT: 157 LBS | BODY MASS INDEX: 28.72 KG/M2

## 2021-05-07 DIAGNOSIS — O26.90 PREGNANCY RELATED CONDITION, ANTEPARTUM: Primary | ICD-10-CM

## 2021-05-07 DIAGNOSIS — Z34.01 ENCOUNTER FOR SUPERVISION OF NORMAL FIRST PREGNANCY IN FIRST TRIMESTER: ICD-10-CM

## 2021-05-07 DIAGNOSIS — Z34.02 ENCOUNTER FOR SUPERVISION OF NORMAL FIRST PREGNANCY IN SECOND TRIMESTER: ICD-10-CM

## 2021-05-07 DIAGNOSIS — Z3A.20 20 WEEKS GESTATION OF PREGNANCY: ICD-10-CM

## 2021-05-07 DIAGNOSIS — O43.92 ABNORMAL PLACENTA AFFECTING MANAGEMENT OF MOTHER IN SECOND TRIMESTER: Primary | ICD-10-CM

## 2021-05-07 PROCEDURE — 99207 PR PRENATAL VISIT: CPT | Performed by: OBSTETRICS & GYNECOLOGY

## 2021-05-07 PROCEDURE — 76805 OB US >/= 14 WKS SNGL FETUS: CPT | Performed by: OBSTETRICS & GYNECOLOGY

## 2021-05-07 NOTE — PROGRESS NOTES
Doing well today  +flutters  No ctx/cramping/vb/spotting; no spotting x 2 months  No bowel/bladder issues  Anatomy us wnl with exception of right sided placental band?  Unsure of etiology.  Unable to see cardiac views due to position today; AGA, posterior placenta

## 2021-05-11 ENCOUNTER — PRE VISIT (OUTPATIENT)
Dept: MATERNAL FETAL MEDICINE | Facility: CLINIC | Age: 31
End: 2021-05-11

## 2021-05-13 ENCOUNTER — OFFICE VISIT (OUTPATIENT)
Dept: MATERNAL FETAL MEDICINE | Facility: CLINIC | Age: 31
End: 2021-05-13
Attending: OBSTETRICS & GYNECOLOGY
Payer: COMMERCIAL

## 2021-05-13 ENCOUNTER — HOSPITAL ENCOUNTER (OUTPATIENT)
Dept: ULTRASOUND IMAGING | Facility: CLINIC | Age: 31
End: 2021-05-13
Attending: OBSTETRICS & GYNECOLOGY
Payer: COMMERCIAL

## 2021-05-13 DIAGNOSIS — O35.9XX0 SUSPECTED FETAL ANOMALY, ANTEPARTUM, SINGLE OR UNSPECIFIED FETUS: ICD-10-CM

## 2021-05-13 DIAGNOSIS — O26.90 PREGNANCY RELATED CONDITION, ANTEPARTUM: ICD-10-CM

## 2021-05-13 DIAGNOSIS — N85.6 UTERINE SYNECHIAE: Primary | ICD-10-CM

## 2021-05-13 PROCEDURE — 76811 OB US DETAILED SNGL FETUS: CPT | Mod: 26 | Performed by: OBSTETRICS & GYNECOLOGY

## 2021-05-13 PROCEDURE — 76811 OB US DETAILED SNGL FETUS: CPT

## 2021-05-13 NOTE — PROGRESS NOTES
"Please see \"Imaging\" tab under \"Chart Review\" for details of today's US at the [facility].    Tico Carr MD  Maternal-Fetal Medicine    "

## 2021-06-04 ENCOUNTER — PRENATAL OFFICE VISIT (OUTPATIENT)
Dept: OBGYN | Facility: CLINIC | Age: 31
End: 2021-06-04
Payer: COMMERCIAL

## 2021-06-04 VITALS — WEIGHT: 157 LBS | BODY MASS INDEX: 28.72 KG/M2 | SYSTOLIC BLOOD PRESSURE: 120 MMHG | DIASTOLIC BLOOD PRESSURE: 68 MMHG

## 2021-06-04 DIAGNOSIS — Z3A.24 24 WEEKS GESTATION OF PREGNANCY: Primary | ICD-10-CM

## 2021-06-04 DIAGNOSIS — Z34.02 ENCOUNTER FOR SUPERVISION OF NORMAL FIRST PREGNANCY IN SECOND TRIMESTER: ICD-10-CM

## 2021-06-04 PROCEDURE — 99207 PR PRENATAL VISIT: CPT | Performed by: OBSTETRICS & GYNECOLOGY

## 2021-06-04 NOTE — PROGRESS NOTES
Doing well today.  +FM  No ctx/cramping/vb/lof  Overall feeling really good  Started fiber for constipation  Occ late afternoon dizzy spells --recommend snack and better hydration  Saw [facility] after last visit due to uterine synechaie --has another  next week  RTC 4wks --glucola, hgb and Tdap at that time

## 2021-06-10 ENCOUNTER — HOSPITAL ENCOUNTER (OUTPATIENT)
Dept: ULTRASOUND IMAGING | Facility: CLINIC | Age: 31
End: 2021-06-10
Attending: OBSTETRICS & GYNECOLOGY
Payer: COMMERCIAL

## 2021-06-10 ENCOUNTER — OFFICE VISIT (OUTPATIENT)
Dept: MATERNAL FETAL MEDICINE | Facility: CLINIC | Age: 31
End: 2021-06-10
Attending: OBSTETRICS & GYNECOLOGY
Payer: COMMERCIAL

## 2021-06-10 DIAGNOSIS — O35.9XX0 SUSPECTED FETAL ANOMALY, ANTEPARTUM, SINGLE OR UNSPECIFIED FETUS: ICD-10-CM

## 2021-06-10 DIAGNOSIS — Z36.89 ENCOUNTER FOR ULTRASOUND TO ASSESS FETAL GROWTH: ICD-10-CM

## 2021-06-10 PROCEDURE — 76816 OB US FOLLOW-UP PER FETUS: CPT | Mod: 26 | Performed by: OBSTETRICS & GYNECOLOGY

## 2021-06-10 PROCEDURE — 76816 OB US FOLLOW-UP PER FETUS: CPT

## 2021-06-10 NOTE — PROGRESS NOTES
"Please see \"Imaging\" tab under \"Chart Review\" for details of today's US at the [facility].    Tico Carr MD  Maternal-Fetal Medicine      "

## 2021-06-23 DIAGNOSIS — Z23 NEED FOR TDAP VACCINATION: ICD-10-CM

## 2021-06-23 DIAGNOSIS — Z34.03 ENCOUNTER FOR SUPERVISION OF NORMAL FIRST PREGNANCY IN THIRD TRIMESTER: Primary | ICD-10-CM

## 2021-07-01 ENCOUNTER — PRENATAL OFFICE VISIT (OUTPATIENT)
Dept: OBGYN | Facility: CLINIC | Age: 31
End: 2021-07-01
Payer: COMMERCIAL

## 2021-07-01 VITALS — DIASTOLIC BLOOD PRESSURE: 62 MMHG | BODY MASS INDEX: 29.63 KG/M2 | SYSTOLIC BLOOD PRESSURE: 120 MMHG | WEIGHT: 162 LBS

## 2021-07-01 DIAGNOSIS — Z34.03 ENCOUNTER FOR SUPERVISION OF NORMAL FIRST PREGNANCY IN THIRD TRIMESTER: ICD-10-CM

## 2021-07-01 DIAGNOSIS — Z3A.28 28 WEEKS GESTATION OF PREGNANCY: Primary | ICD-10-CM

## 2021-07-01 DIAGNOSIS — Z23 NEED FOR TDAP VACCINATION: ICD-10-CM

## 2021-07-01 LAB
ERYTHROCYTE [DISTWIDTH] IN BLOOD BY AUTOMATED COUNT: 13.6 % (ref 10–15)
GLUCOSE 1H P 50 G GLC PO SERPL-MCNC: 136 MG/DL (ref 60–129)
HCT VFR BLD AUTO: 37.8 % (ref 35–47)
HGB BLD-MCNC: 11.9 G/DL (ref 11.7–15.7)
MCH RBC QN AUTO: 26.9 PG (ref 26.5–33)
MCHC RBC AUTO-ENTMCNC: 31.5 G/DL (ref 31.5–36.5)
MCV RBC AUTO: 86 FL (ref 78–100)
PLATELET # BLD AUTO: 271 10E9/L (ref 150–450)
RBC # BLD AUTO: 4.42 10E12/L (ref 3.8–5.2)
WBC # BLD AUTO: 8.5 10E9/L (ref 4–11)

## 2021-07-01 PROCEDURE — 90715 TDAP VACCINE 7 YRS/> IM: CPT

## 2021-07-01 PROCEDURE — 99207 PR PRENATAL VISIT: CPT | Performed by: OBSTETRICS & GYNECOLOGY

## 2021-07-01 PROCEDURE — 36415 COLL VENOUS BLD VENIPUNCTURE: CPT | Performed by: OBSTETRICS & GYNECOLOGY

## 2021-07-01 PROCEDURE — 90471 IMMUNIZATION ADMIN: CPT

## 2021-07-01 PROCEDURE — 82950 GLUCOSE TEST: CPT | Performed by: OBSTETRICS & GYNECOLOGY

## 2021-07-01 PROCEDURE — 85027 COMPLETE CBC AUTOMATED: CPT | Performed by: OBSTETRICS & GYNECOLOGY

## 2021-07-01 NOTE — PROGRESS NOTES
Prior to immunization administration, verified patients identity using patient s name and date of birth. Please see Immunization Activity for additional information.     Screening Questionnaire for Adult Immunization    Are you sick today?   No   Do you have allergies to medications, food, a vaccine component or latex?   No   Have you ever had a serious reaction after receiving a vaccination?   No   Do you have a long-term health problem with heart, lung, kidney, or metabolic disease (e.g., diabetes), asthma, a blood disorder, no spleen, complement component deficiency, a cochlear implant, or a spinal fluid leak?  Are you on long-term aspirin therapy?   No   Do you have cancer, leukemia, HIV/AIDS, or any other immune system problem?   No   Do you have a parent, brother, or sister with an immune system problem?   No   In the past 3 months, have you taken medications that affect  your immune system, such as prednisone, other steroids, or anticancer drugs; drugs for the treatment of rheumatoid arthritis, Crohn s disease, or psoriasis; or have you had radiation treatments?   No   Have you had a seizure, or a brain or other nervous system problem?   No   During the past year, have you received a transfusion of blood or blood    products, or been given immune (gamma) globulin or antiviral drug?   No   For women: Are you pregnant or is there a chance you could become       pregnant during the next month?   Yes   Have you received any vaccinations in the past 4 weeks?   No     Immunization questionnaire was positive for at least one answer.  Notified Dr. Foley.        Per orders of Dr. Foley, injection of tdap given by Alyssa Marks CMA. Patient instructed to remain in clinic for 15 minutes afterwards, and to report any adverse reaction to me immediately.       Screening performed by Alyssa Marks CMA on 7/1/2021 at 10:28 AM.

## 2021-07-01 NOTE — PROGRESS NOTES
Doing well today.  +FM  No ctx/cramping/vb/lof  Had repeat us with MFM which showed drop in growth (19%ile to 11%ile); also had questions about aortic dilitation -will have echo and repeat growth next week with them  Glucola, hgb and Tdap today  RTC 2wks

## 2021-07-09 ENCOUNTER — HOSPITAL ENCOUNTER (OUTPATIENT)
Dept: CARDIOLOGY | Facility: CLINIC | Age: 31
End: 2021-07-09
Attending: OBSTETRICS & GYNECOLOGY
Payer: COMMERCIAL

## 2021-07-09 ENCOUNTER — HOSPITAL ENCOUNTER (OUTPATIENT)
Dept: ULTRASOUND IMAGING | Facility: CLINIC | Age: 31
End: 2021-07-09
Attending: OBSTETRICS & GYNECOLOGY
Payer: COMMERCIAL

## 2021-07-09 ENCOUNTER — OFFICE VISIT (OUTPATIENT)
Dept: CARDIOLOGY | Facility: CLINIC | Age: 31
End: 2021-07-09
Payer: COMMERCIAL

## 2021-07-09 ENCOUNTER — OFFICE VISIT (OUTPATIENT)
Dept: MATERNAL FETAL MEDICINE | Facility: CLINIC | Age: 31
End: 2021-07-09
Attending: OBSTETRICS & GYNECOLOGY
Payer: COMMERCIAL

## 2021-07-09 DIAGNOSIS — O35.9XX0 SUSPECTED FETAL ANOMALY, ANTEPARTUM, SINGLE OR UNSPECIFIED FETUS: ICD-10-CM

## 2021-07-09 DIAGNOSIS — O35.BXX0 FETAL CARDIAC DISEASE AFFECTING PREGNANCY, SINGLE OR UNSPECIFIED FETUS: Primary | ICD-10-CM

## 2021-07-09 DIAGNOSIS — Z36.89 ENCOUNTER FOR ULTRASOUND TO ASSESS FETAL GROWTH: ICD-10-CM

## 2021-07-09 PROCEDURE — 76816 OB US FOLLOW-UP PER FETUS: CPT

## 2021-07-09 PROCEDURE — 76825 ECHO EXAM OF FETAL HEART: CPT | Mod: 26 | Performed by: PEDIATRICS

## 2021-07-09 PROCEDURE — 93325 DOPPLER ECHO COLOR FLOW MAPG: CPT

## 2021-07-09 PROCEDURE — 76827 ECHO EXAM OF FETAL HEART: CPT | Mod: 26 | Performed by: PEDIATRICS

## 2021-07-09 PROCEDURE — 76816 OB US FOLLOW-UP PER FETUS: CPT | Mod: 26 | Performed by: OBSTETRICS & GYNECOLOGY

## 2021-07-09 PROCEDURE — 93325 DOPPLER ECHO COLOR FLOW MAPG: CPT | Mod: 26 | Performed by: PEDIATRICS

## 2021-07-09 PROCEDURE — 99204 OFFICE O/P NEW MOD 45 MIN: CPT | Performed by: PEDIATRICS

## 2021-07-09 NOTE — PROGRESS NOTES
"Please see \"Imaging\" tab under \"Chart Review\" for details of today's US at the [facility].    Tico Carr MD  Maternal-Fetal Medicine      "

## 2021-07-09 NOTE — PROGRESS NOTES
"[facility]   Heart Center Fetal Consult Note    Patient:  Cassandra Langley MRN:  [id number]   YOB: 1990 Age:  31 year old   Date of Visit:  2021 PCP:  Deja Parkinson PA-C     Dear Dr. Carr,     I had the pleasure of seeing Cassandra Langley at the [facility] on 2021 in fetal cardiology consultation for fetal echocardiogram results. She presented today accompanied by her . As you know, she is a 31 year old  at 29w2d who presented for fetal echocardiogram today because of abnormal 3 vessel view.    I performed and interpreted the fetal echocardiogram today, which demonstrated Tetralogy of Fallot. Moderate anterior malaignment ventricular septal defect, mild pulmonary valve stenosis (z-score of -2.8). Branch pulmonary arteries are continuous and likely normal in size. The ductus arteriosus is antegrade. Mildly dilated right ventricle with normal systolic function. Normal left ventricular size and systolic function. Fetal heart rate is regular at 152 bpm. No hydrops.    With the help of diagrams, I reviewed the echo findings today with Cassandra Langley and her partner. I discussed the fetal cardiac anatomy, function, and physiology. I anticipate that this will be a \"pink\" Tetralogy of Fallot based on the pulmonary valve size and antegrade flow across the ductus arteriosus. I explained that within the first 3-6 months the baby will need surgery for a full repair.  I recommended delivery at [facility], with plans for post-hammad echocardiogram within 24 hours of life. I reviewed the importance of a post- transthoracic echocardiogram to confirm these findings and help direct management. She had appropriate questions which I addressed. I provided her with my direct contact information for additional questions after they left.    Plan:    1. Follow up Fetal Echo at 33-34 weeks GA.   2. Transthoracic echocardiogram to " be obtained within 24 hours of life.     Thank you for allowing me to participate in Cassandra's care. Please do not hesitate to contact me with questions or concerns.    This visit was separate from the performance and interpretation of the ultrasound. The majority of the time (>50%) was spent in counseling and coordination of care. I spent approximately 45 minutes in face-to-face time reviewing the above considerations.    Cristina Alejandro M.D.  Pediatric Cardiology  [facility]'s [address], 5th floor, [place] [id number]  Phone [phone]  Fax [phone]

## 2021-07-09 NOTE — NURSING NOTE
"Pt here for RL2/fetal echo with spouse. Seen by Dr Carr, see his notes. Fetus \"Sariat\" with TOF per peds cards echo today. Pt has JOHNNY with Dr. Foley scheduled 7/16, instructed pt to keep this appt. Will have DEISI OBV with MFM 8/4 with RL2/BPP. NICU 8/9 with BPP.    Pt states she was told she passed 1 hr GCT, discussed recommendation for 3 hr GTT from [facility]. Will contact Dr. Foley to create plan for pt. Pt given PCC# for questions. Discharged stable/ambulatory.   "

## 2021-07-13 PROBLEM — O35.BXX0 FETAL CARDIAC ANOMALY AFFECTING PREGNANCY, ANTEPARTUM: Status: ACTIVE | Noted: 2021-07-13

## 2021-07-15 DIAGNOSIS — R73.02 IMPAIRED GLUCOSE TOLERANCE: Primary | ICD-10-CM

## 2021-07-16 ENCOUNTER — PRENATAL OFFICE VISIT (OUTPATIENT)
Dept: OBGYN | Facility: CLINIC | Age: 31
End: 2021-07-16
Payer: COMMERCIAL

## 2021-07-16 ENCOUNTER — LAB (OUTPATIENT)
Dept: LAB | Facility: CLINIC | Age: 31
End: 2021-07-16
Payer: COMMERCIAL

## 2021-07-16 VITALS — BODY MASS INDEX: 29.81 KG/M2 | DIASTOLIC BLOOD PRESSURE: 78 MMHG | WEIGHT: 163 LBS | SYSTOLIC BLOOD PRESSURE: 120 MMHG

## 2021-07-16 DIAGNOSIS — Z3A.30 30 WEEKS GESTATION OF PREGNANCY: Primary | ICD-10-CM

## 2021-07-16 DIAGNOSIS — R73.02 IMPAIRED GLUCOSE TOLERANCE: ICD-10-CM

## 2021-07-16 DIAGNOSIS — Z34.03 ENCOUNTER FOR SUPERVISION OF NORMAL FIRST PREGNANCY IN THIRD TRIMESTER: ICD-10-CM

## 2021-07-16 DIAGNOSIS — O35.BXX1 FETAL CARDIAC ANOMALY COMPLICATING PREGNANCY, ANTEPARTUM, FETUS 1: ICD-10-CM

## 2021-07-16 LAB — GLUCOSE P FAST SERPL-MCNC: 83 MG/DL (ref 60–94)

## 2021-07-16 PROCEDURE — 99207 PR PRENATAL VISIT: CPT | Performed by: OBSTETRICS & GYNECOLOGY

## 2021-07-16 PROCEDURE — 36415 COLL VENOUS BLD VENIPUNCTURE: CPT

## 2021-07-16 PROCEDURE — 82951 GLUCOSE TOLERANCE TEST (GTT): CPT

## 2021-07-16 PROCEDURE — 82952 GTT-ADDED SAMPLES: CPT

## 2021-07-16 NOTE — PROGRESS NOTES
Doing well today --doing 3hrGTT at recommendation of MFM based on glucola of 136  Diagnosed with tetraology of fallot with MFM/cardiology last week --will be transferring cares to [place] after our next visit  Feeling comfortable with plan; understand being at the appropriate place with appropriate specialists for baby is of utmost importance  Discussed tetralogy of fallot and corrective procedure  +FM  No ctx/cramping/vb/spotting/lof  Overall feeling well  No bowel/bladder issues  RTC 2wks

## 2021-07-17 LAB
GESTATIONAL GTT 1 HR POST DOSE: 155 MG/DL (ref 60–179)
GESTATIONAL GTT 2 HR POST DOSE: 113 MG/DL (ref 60–154)
GESTATIONAL GTT 3 HR POST DOSE: 73 MG/DL (ref 60–139)

## 2021-07-28 ENCOUNTER — TELEPHONE (OUTPATIENT)
Dept: MATERNAL FETAL MEDICINE | Facility: CLINIC | Age: 31
End: 2021-07-28

## 2021-07-28 NOTE — TELEPHONE ENCOUNTER
2021    Cassandra's case was reviewed with pediatric genetics 21 and the recommendation was made for  microarray testing on cord blood with genetics to follow up depending on array results.  A call was placed to discuss and begin coordinating this care.  A generic VM was left and genetic counseling will follow up with the patient.     Michael Higgins MS, [facility]  Licensed Genetic Counselor  Phone: [phone]  Pager: [phone]

## 2021-07-28 NOTE — TELEPHONE ENCOUNTER
2021    Called Cassandra to discuss recommendations from pediatric genetics for  evaluation. We discussed the recommendation for  cord blood testing, specifically a chromosome microarray to assess for clinically significant deletions or duplications of genetic material that could explain the physical differences for the pregnancy or provide insight for further health management for their child.  Cassandra plans to discuss this testing with her  and will contact genetic counseling to discuss further.  All of Cassandra's questions were answered to her satisfaction and she plans to contact genetic counseling to discuss further.     Michael Higgins MS, [facility]  Licensed Genetic Counselor  Phone: [phone]  Pager: [phone]

## 2021-07-30 ENCOUNTER — PRENATAL OFFICE VISIT (OUTPATIENT)
Dept: OBGYN | Facility: CLINIC | Age: 31
End: 2021-07-30
Payer: COMMERCIAL

## 2021-07-30 VITALS — BODY MASS INDEX: 30.54 KG/M2 | DIASTOLIC BLOOD PRESSURE: 74 MMHG | SYSTOLIC BLOOD PRESSURE: 120 MMHG | WEIGHT: 167 LBS

## 2021-07-30 DIAGNOSIS — Z34.03 ENCOUNTER FOR SUPERVISION OF NORMAL FIRST PREGNANCY IN THIRD TRIMESTER: ICD-10-CM

## 2021-07-30 DIAGNOSIS — Z3A.32 32 WEEKS GESTATION OF PREGNANCY: Primary | ICD-10-CM

## 2021-07-30 DIAGNOSIS — O35.BXX1 FETAL CARDIAC ANOMALY COMPLICATING PREGNANCY, ANTEPARTUM, FETUS 1: ICD-10-CM

## 2021-07-30 PROCEDURE — 59426 ANTEPARTUM CARE ONLY: CPT | Performed by: OBSTETRICS & GYNECOLOGY

## 2021-07-30 PROCEDURE — 99207 PR PRENATAL VISIT: CPT | Performed by: OBSTETRICS & GYNECOLOGY

## 2021-07-30 NOTE — PROGRESS NOTES
Doing well today.  +FM  Occ BH contractions; no vb/spotting/lof  Anxious about transfer but understands importance --first MFM appt with echo next week for official transer  Passed 3hrGTT last week  Will keep us updated with delivery, etc and call with any questions/concerns

## 2021-08-03 ENCOUNTER — NURSE TRIAGE (OUTPATIENT)
Dept: NURSING | Facility: CLINIC | Age: 31
End: 2021-08-03

## 2021-08-03 ENCOUNTER — MYC MEDICAL ADVICE (OUTPATIENT)
Dept: OBGYN | Facility: CLINIC | Age: 31
End: 2021-08-03

## 2021-08-03 NOTE — TELEPHONE ENCOUNTER
"Last night she felt a menstrual type cramp on lower left side between pelvis and hip and radiated to her butt.  Consistent for an hour.  0600 had BM and still felt the discomfort.    Pain is now dull and not as painful. Consistent BH's now for last hour 5-10\" - super light and mild. Slightly lower than when she experienced before. Only last a few seconds and goes away. Feeling paranoid w baby's heart condition.  Denies LOF and VB  Reporting active FM    Admits she is not drinking fluids as she was scared to as she didn't want to cause any discomfort.  Reassured pt that pains could be ligament/muscle stretching. Warm tub bath, mild heat or even ice, tylenol prn. Mild stretches in the future.  With the mild cramping, instructed pt to push fluids and lay low for now. Nothing in vagina. Monitor closely and if become stronger or last longer and more consistent she needs to call back.  She has her apt at [facility] tomorrow.    Routing to Dr Foley to update and see if has further recommendations to reassure pt.    Pt verbalized understanding, in agreement with plan, and voiced no further questions.  Deena Zapata RN on 8/3/2021 at 10:04 AM        "

## 2021-08-03 NOTE — TELEPHONE ENCOUNTER
Called pt with comments and recommendations.  Pt very grateful for feedback, will be seen at [facility] tomorrow and will discuss with them as well.  Pt verbalized understanding, in agreement with plan, and voiced no further questions.    Kaela Zhao RN on 8/3/2021 at 12:05 PM

## 2021-08-03 NOTE — TELEPHONE ENCOUNTER
Agree with all of the above --take it easy, hydrate, etc  Very normal to have cramping here and there at this point in pregnancy.  If pains were to worsen or intensify, or be associated with any bleeding/spotting, leaking fluid, would need to be seen at [place] which is where she'll deliver due to baby's heart condition.  CAn also mention at her upcoming appt --they may consider a cervix check if symptoms continue

## 2021-08-03 NOTE — TELEPHONE ENCOUNTER
Contractions, cramping pains last night and this morning. It was tightness in stomach. Now by left hip, like a menstrual cramping. Started midnight, 32 wk, 6 days pregnant. Dr Foley in OB, patient was triage to home care and advised when to call back. Patient wanted me to send you a message about this. She has no further questions at this time.  Tiffanie Cooper RN  [facility]      Reason for Disposition    MILD abdominal pain (e.g., doesn't interfere with normal activities)    Additional Information    Negative: Passed out (i.e., fainted, collapsed and was not responding)    Negative: Shock suspected (e.g., cold/pale/clammy skin, too weak to stand, low BP, rapid pulse)    Negative: Difficult to awaken or acting confused (e.g., disoriented, slurred speech)    Negative: SEVERE abdominal pain (e.g., excruciating), constant, and present > 1 hour    Negative: SEVERE vaginal bleeding (e.g., continuous red blood from vagina, large blood clots)    Negative: Sounds like a life-threatening emergency to the triager    Negative: Having contractions or other symptoms of labor (such as vaginal pressure) and > 36 weeks pregnant (i.e., term pregnancy)    Negative: Having contractions or other symptoms of labor (such as vaginal pressure) and < 37 weeks pregnant (i.e., )    Negative: Abdominal pain and pregnant < 20 weeks    Negative: Vomiting red blood or black (coffee ground) material    Negative: MODERATE-SEVERE abdominal pain (e.g., interferes with normal activities, awakens from sleep)     Pain at 3    Negative: Vaginal bleeding or spotting    Negative: Baby moving less today (e.g., kick count < 5 in 1 hour or < 10 in 2 hours) and 23 or more weeks pregnant    Negative: Leakage of fluid from vagina    Negative: SEVERE headache that is not relieved with acetaminophen (e.g., Tylenol)    Negative: New hand or face swelling    Negative: New blurred vision or vision changes    Negative: MILD constant abdominal  pain (e.g., doesn't interfere with normal activities) and present > 2 hours    Negative: Intermittent lower abdominal pain lasting > 24 hours     Once an hour to every 15 minutes for the past two hours.    Negative: Fever > 100.4 F (38.0 C)     97.9    Negative: White of the eyes have turned yellow (i.e., jaundice)    Negative: Patient sounds very sick or weak to the triager    Negative: Discomfort when passing urine (e.g., pain, burning or stinging)    Negative: Blood in urine (red, pink, or tea-colored)    Negative: Patient wants to be seen    Negative: Unusual vaginal discharge (e.g., odorous, yellow, green, or foamy-white)    Negative: Upper abdominal pains and radiate into chest, with sour taste in mouth    Negative: Upper abdominal pains and occur regularly about 1 hour after meals    Negative: Abdominal pain is a chronic symptom (recurrent or ongoing AND lasting > 4 weeks)    Protocols used: PREGNANCY - ABDOMINAL PAIN GREATER THAN 20 WEEKS EGA-A-OH

## 2021-08-04 ENCOUNTER — HOSPITAL ENCOUNTER (OUTPATIENT)
Dept: CARDIOLOGY | Facility: CLINIC | Age: 31
End: 2021-08-04
Attending: OBSTETRICS & GYNECOLOGY
Payer: COMMERCIAL

## 2021-08-04 ENCOUNTER — HOSPITAL ENCOUNTER (OUTPATIENT)
Dept: ULTRASOUND IMAGING | Facility: CLINIC | Age: 31
End: 2021-08-04
Attending: OBSTETRICS & GYNECOLOGY
Payer: COMMERCIAL

## 2021-08-04 ENCOUNTER — OFFICE VISIT (OUTPATIENT)
Dept: MATERNAL FETAL MEDICINE | Facility: CLINIC | Age: 31
End: 2021-08-04
Attending: OBSTETRICS & GYNECOLOGY
Payer: COMMERCIAL

## 2021-08-04 ENCOUNTER — OFFICE VISIT (OUTPATIENT)
Dept: PEDIATRIC CARDIOLOGY | Facility: CLINIC | Age: 31
End: 2021-08-04

## 2021-08-04 VITALS
OXYGEN SATURATION: 97 % | SYSTOLIC BLOOD PRESSURE: 133 MMHG | RESPIRATION RATE: 16 BRPM | HEART RATE: 97 BPM | BODY MASS INDEX: 29.9 KG/M2 | WEIGHT: 163.5 LBS | DIASTOLIC BLOOD PRESSURE: 86 MMHG

## 2021-08-04 DIAGNOSIS — O35.BXX0 ANOMALY OF HEART OF FETUS AFFECTING PREGNANCY, ANTEPARTUM, SINGLE OR UNSPECIFIED FETUS: Primary | ICD-10-CM

## 2021-08-04 DIAGNOSIS — O35.BXX0 FETAL CARDIAC DISEASE AFFECTING PREGNANCY, SINGLE OR UNSPECIFIED FETUS: ICD-10-CM

## 2021-08-04 DIAGNOSIS — O36.5990 PREGNANCY AFFECTED BY FETAL GROWTH RESTRICTION: ICD-10-CM

## 2021-08-04 DIAGNOSIS — O36.5930 POOR FETAL GROWTH AFFECTING MANAGEMENT OF MOTHER IN THIRD TRIMESTER, SINGLE OR UNSPECIFIED FETUS: ICD-10-CM

## 2021-08-04 DIAGNOSIS — O26.893 VAGINAL DISCHARGE DURING PREGNANCY IN THIRD TRIMESTER: Primary | ICD-10-CM

## 2021-08-04 DIAGNOSIS — N89.8 VAGINAL DISCHARGE DURING PREGNANCY IN THIRD TRIMESTER: Primary | ICD-10-CM

## 2021-08-04 DIAGNOSIS — O35.BXX0 FETAL CARDIAC DISEASE AFFECTING PREGNANCY, SINGLE OR UNSPECIFIED FETUS: Primary | ICD-10-CM

## 2021-08-04 LAB
CLUE CELLS: NORMAL
FFN SPECIMEN INTEGRITY: ABNORMAL
FIBRONECTIN FETAL VAG QL: POSITIVE
RUPTURE OF FETAL MEMBRANES BY ROM PLUS: NEGATIVE
TRICHOMONAS, WET PREP: NORMAL
WBC'S/HIGH POWER FIELD, WET PREP: NORMAL
YEAST, WET PREP: NORMAL

## 2021-08-04 PROCEDURE — 84112 EVAL AMNIOTIC FLUID PROTEIN: CPT | Performed by: ADVANCED PRACTICE MIDWIFE

## 2021-08-04 PROCEDURE — 76819 FETAL BIOPHYS PROFIL W/O NST: CPT

## 2021-08-04 PROCEDURE — 93325 DOPPLER ECHO COLOR FLOW MAPG: CPT

## 2021-08-04 PROCEDURE — 76816 OB US FOLLOW-UP PER FETUS: CPT | Mod: 26 | Performed by: OBSTETRICS & GYNECOLOGY

## 2021-08-04 PROCEDURE — 87653 STREP B DNA AMP PROBE: CPT | Performed by: ADVANCED PRACTICE MIDWIFE

## 2021-08-04 PROCEDURE — 76825 ECHO EXAM OF FETAL HEART: CPT | Mod: 26 | Performed by: PEDIATRICS

## 2021-08-04 PROCEDURE — 87210 SMEAR WET MOUNT SALINE/INK: CPT | Performed by: ADVANCED PRACTICE MIDWIFE

## 2021-08-04 PROCEDURE — 87186 SC STD MICRODIL/AGAR DIL: CPT | Performed by: ADVANCED PRACTICE MIDWIFE

## 2021-08-04 PROCEDURE — 76820 UMBILICAL ARTERY ECHO: CPT | Mod: 26 | Performed by: OBSTETRICS & GYNECOLOGY

## 2021-08-04 PROCEDURE — 99202 OFFICE O/P NEW SF 15 MIN: CPT | Mod: 25 | Performed by: PEDIATRICS

## 2021-08-04 PROCEDURE — G0463 HOSPITAL OUTPT CLINIC VISIT: HCPCS | Mod: 25

## 2021-08-04 PROCEDURE — 76827 ECHO EXAM OF FETAL HEART: CPT | Mod: 26 | Performed by: PEDIATRICS

## 2021-08-04 PROCEDURE — 99213 OFFICE O/P EST LOW 20 MIN: CPT | Mod: 25 | Performed by: PEDIATRICS

## 2021-08-04 PROCEDURE — 87081 CULTURE SCREEN ONLY: CPT | Performed by: ADVANCED PRACTICE MIDWIFE

## 2021-08-04 PROCEDURE — 76818 FETAL BIOPHYS PROFILE W/NST: CPT | Mod: 26 | Performed by: OBSTETRICS & GYNECOLOGY

## 2021-08-04 PROCEDURE — 99213 OFFICE O/P EST LOW 20 MIN: CPT | Mod: 25 | Performed by: ADVANCED PRACTICE MIDWIFE

## 2021-08-04 PROCEDURE — 82731 ASSAY OF FETAL FIBRONECTIN: CPT | Performed by: ADVANCED PRACTICE MIDWIFE

## 2021-08-04 PROCEDURE — 76816 OB US FOLLOW-UP PER FETUS: CPT

## 2021-08-04 PROCEDURE — 93325 DOPPLER ECHO COLOR FLOW MAPG: CPT | Mod: 26 | Performed by: PEDIATRICS

## 2021-08-04 NOTE — PROGRESS NOTES
Please see the imaging tab for details of the ultrasound performed today.    Miriam Keys MD  Specialist in Maternal-Fetal Medicine

## 2021-08-04 NOTE — LETTER
2021      RE: Cassandra Langley  [address] [facility] [address]       [facility]   Heart Center Fetal Consult Note    Patient:  Cassandra Langley MRN:  [id number]   YOB: 1990 Age:  31 year old   Date of Visit:  2021 PCP:  Deja Parkinson PA-C     Dear Dr. Carr,     I had the pleasure of seeing Cassandra Langley at the [facility] on 2021 in fetal cardiology consultation for fetal echocardiogram results. She presented today accompanied by her . As you know, she is a 31 year old  at 33w0d who presented for fetal echocardiogram today because of abnormal 3 vessel view.    I performed and interpreted the fetal echocardiogram today, which demonstrated Mild anterior malaignment ventricular septal defect with normal flow across the pulmonary valve. Good size branch pulmonary artery. Normal left and Right ventricular size and systolic function. Fetal heart rate is regular at 147 bpm. No hydrops.    The parents had appropriate questions. I did my best to answer their questions. I reviewed the echo findings today with Cassandra Langley and her partner. I discussed the fetal cardiac anatomy, function, and physiology.The abnormal results of the fetal echocardiogram were explained to the patient.      Plan:    1. Delivery is recommended at [facility].   2. Cardiology consultation and echocardiogram is recommended within 24 hours of delivery. The cardiac condition does not require prostaglandin therapy.   3. No additional fetal echocardiograms are recommended.     Thank you for allowing me to participate in Cassandra's care.  Feel free to contact me with questions.    I spend 10 minutes counseling the patient about her fetal echocardiogram findings. All of this time was face to face.    Dr Kristen Rodriguez  Pediatric Cardiologist  [facility]  Phone  [phone]

## 2021-08-04 NOTE — NURSING NOTE
"Cassandra seen in clinic today with spouse Ty for DEISI OB visit/fetal echo/RL2/BPP at 33w0d gestation for pregnancy complicated by fetal CHD (see report/notes). VSS. Pt reports positive fetal movement, see flowsheet. Pt denies bldg/lof/headache/vision changes/chest pain/SOB/edema. Endorses one episode of blood tinged mucous over the weekend, associated with intermittent LLQ abd pain that wraps from \"front of pubic bone to anus every 8 minutes.\" Has picture of mucous on phone. Endorses pelvic pressure. No LOF, VB. States she swells in LE nightly, resolves by AM. Denies medical hx, other ob/preeclampsia symptoms. NOB folder reviewed, contact information for triage needs provided. Dr. Keys, Lissette RICEM met with pt and discussed POC. Plan for OBV q2wks, twice weekly UAR/NST with once/wk BPP and once/wk Ltd US, growth to be repeated in 2 wks, delivery no later than 37wks. No pre-e labs today, needs repeat BP on Fri at US. Reviewed reportable sx, pt VU. Pt interested in cord blood testing, GC ordered. Labs collected today. Future visits scheduled at . Pt discharged stable and ambulatory.    "

## 2021-08-04 NOTE — NURSING NOTE
Received call from Giselle Couch RN, reporting lab call of positive FFN result. Lissette Garcia CNM aware. GBS pending.

## 2021-08-04 NOTE — PROGRESS NOTES
[facility]   Heart Center Fetal Consult Note    Patient:  Cassandra Langley MRN:  [id number]   YOB: 1990 Age:  31 year old   Date of Visit:  2021 PCP:  Deja Parkinson PA-C     Dear Dr. Carr,     I had the pleasure of seeing Cassandra Langley at the [facility] on 2021 in fetal cardiology consultation for fetal echocardiogram results. She presented today accompanied by her . As you know, she is a 31 year old  at 33w0d who presented for fetal echocardiogram today because of abnormal 3 vessel view.    I performed and interpreted the fetal echocardiogram today, which demonstrated Mild anterior malaignment ventricular septal defect with normal flow across the pulmonary valve. Good size branch pulmonary artery. Normal left and Right ventricular size and systolic function. Fetal heart rate is regular at 147 bpm. No hydrops.    The parents had appropriate questions. I did my best to answer their questions. I reviewed the echo findings today with Cassandra Langley and her partner. I discussed the fetal cardiac anatomy, function, and physiology.The abnormal results of the fetal echocardiogram were explained to the patient.      Plan:    1. Delivery is recommended at [facility].   2. Cardiology consultation and echocardiogram is recommended within 24 hours of delivery. The cardiac condition does not require prostaglandin therapy.   3. No additional fetal echocardiograms are recommended.     Thank you for allowing me to participate in Cassandra's care.  Feel free to contact me with questions.    I spend 10 minutes counseling the patient about her fetal echocardiogram findings. All of this time was face to face.    Dr Kristen Rodriguez  Pediatric Cardiologist  [facility]  Phone [phone]

## 2021-08-04 NOTE — PROGRESS NOTES
Maternal-Fetal Medicine   First OB Visit    Cassandra Langley  : 1990  MRN: [id number]      HPI:  Cassandra Langley is a 31 year old  at 33w0d by LMP consistent with 9w6d US here for transfer of care OB visit     Today, she is here with her  Paul. She reports feeling well overall, but last evening did have a period of mild cramping worse on her left side, but radiated to her low back. Cramping noted to be about every 8 minutes along with pink-tinged mucous. Has picture on her phone which looks consistent with mucous plug. The discharge was also somewhat watery in consistency, but has not returned. Denies recent intercourse, dysuria, or vaginal pain/pressure.       Prenatal Care:  Primary OB care this pregnancy has been with Dr. Floey from [facility].    Dating:    LMP: 20, cycles regular  Dating ultrasound: 21 at 9w6d  Assisted reproduction: none  Assigned EDC: 21 by LMP      OB HISTORY  OB History    Para Term  AB Living   1 0 0 0 0 0   SAB TAB Ectopic Multiple Live Births   0 0 0 0 0      # Outcome Date GA Lbr [place]ato/2nd Weight Sex Delivery Anes PTL Lv   1 Current                History of GDM: No,  PTL : No,  History of HTN in pregnancy: No,  Thrombocytopenia: No,  Shoulder dystocia: No,  Vacuum Extraction: No  PPH: No   3rd of 4th degree laceration: No.   Other complications: No    Gynecologic History:  - Last Pap on 18. NIL  - Denies any history of abnormal pap smears  - Denies prior cervical surgery or procedures  - Denies any history of frequent UTIs, vaginal infections, or STIs    Past Medical History:  N/a    Past Surgical History:  none    INFECTION HISTORY  HIV: No  Hepatitis B: No  Hepatitis C: No  Tuberculosis: No    Genital Herpes self: no  Herpes partner:  no  Chlamydia:  no  Gonorrhea:  no  HPV: No  Syphilis:  No      Current Medications:  Prior to Admission medications    Medication Sig Last Dose Taking? Auth Provider   Prenatal  "Vit-Fe Fum-FA-Omega (PRENATAL MULTI +DHA PO) Take 1 tablet by mouth daily   Reported, Patient         Allergies:  Keflex [cephalosporins]    Social History:   Occupation: HR for [employer]  Status:    Denies use of alcohol, drugs or smoking.    Family History:  Denies history of genetic disorders, preeeclampsia, thromboembolic disease, bleeding disorders, mental retardation    Partner History:  Non-contributory    ROS:  10-point ROS negative except as in HPI     PHYSICAL EXAM:    /86 (BP Location: Left arm, Patient Position: Sitting, Cuff Size: Adult Regular)   Pulse 97   Resp 16   Wt 74.2 kg (163 lb 8 oz)   LMP 2020   SpO2 97%   BMI 29.90 kg/m      Gen: NAD, well appearing  Respiratory: breathing unlabored, no SOB,  lungs clear  Abdomen: gravid, non-tender, non-distended  Pelvic: SSE revealed normal physiologic discharge in vaginal vault. Cervix visually appears thick and closed, but noted small amount of bleeding on surface of cervix, not coming from os. After removal of speculum, moderate amount of watery discharge noted leaking from vagina onto exam table. SVE: closed/thick/-1, anterior, medium consistency.   Extremities: WNL      Prenatal Labs:    Reviewed, see prenatal tab.       Ultrasounds:   Please see \"imaging\" tab under chart review for today's ultrasound results.      Other Imaging:   Please see Fetal Echo under \"Cardiology\" tab for today's results.      ASSESSMENT/PLAN:    Cassnadra Langley is a 31 year old  at 33w0d here for new OB visit     Pregnancy complicated by:   - CHD: VSD  - Severe FGR with abnormal dopplers  - +FFN on         #Transfer of Care:  Oriented patient to [facility] practice. Reviewed that [facility] is a multidisciplinary institution and her care will be collaborative in fashion with [facility] doctors, CNM, residents, fellows, and [facility] clinic for intrapartum management. Patient thinks she may have preference for CNM team for which " she would be appropriate.    #CHD:  - Mild anterior malaignment ventricular septal defect with normal flow across the pulmonary valve. Good size branch pulmonary artery. Normal left and Right ventricular size and systolic function. No further echos indicated at this time.  - Cardiology consult and echo within 24 hours after birth    #+FFN:  - PTL work up overall reassuring in the setting of closed, thick cervix, however FFN did result positive.  - Reviewed this puts her at an increased risk for PTL over the next 14 days, but does not mean this is certain.   - PTL precautions reviewed thoroughly. Recommend rest, hydration, and pelvic rest for the next 14 days. Instructed to present to L&D with any worsening symptoms   - Wet prep negative  - ROM+ negative    #Routine PNC:  - Prenatal labs:   Rh: +  antibody: neg   HepB/HIV/RPR: nonreactive   Rubella: immune   GCT: 136  GTT: 83, 155, 113, 73   UC: no growth   Pap: NIL - due 2021  - Immunizations: s/p flu, Tdap, and Covid   - Genetic screening: low-risk NIPT and MSAFP; recommendation for  cord blood microarray. Will assess patient's interest in this at next visit.    # Surveillance:  - Serial growth US. Next in 2 weeks.  - Twice weekly limited US with UA dopplers and NST.  - Weekly BPPs    #Delivery Planning:  - In the setting of severe FGR, delivery is recommended at 37 weeks. Could be indicated sooner if  testing is non-reassuring.   - Hopes to avoid pain medication while in labor, but need to review pharmacologic options at upcoming visit.  - Feeding: needs to be discussed  - Contraception: needs to be discussed      25 minutes spent on the date of the encounter, doing chart review, history and exam, documentation and further activities as noted.      Lissette Garcia CNM on 2021 at 2:15 PM

## 2021-08-04 NOTE — CONSULTS
[facility]   Heart Center Fetal Consult Note    Patient:  Cassandra Langley MRN:  [id number]   YOB: 1990 Age:  31 year old   Date of Visit:  2021 PCP:  Deja Parkinson PA-C     Dear Dr. Carr,     I had the pleasure of seeing Cassandra Langley at the [facility] on 2021 in fetal cardiology consultation for fetal echocardiogram results. She presented today accompanied by her . As you know, she is a 31 year old  at 33w0d who presented for fetal echocardiogram today because of abnormal 3 vessel view.    I performed and interpreted the fetal echocardiogram today, which demonstrated Mild anterior malaignment ventricular septal defect with normal flow across the pulmonary valve. Good size branch pulmonary artery. Normal left and Right ventricular size and systolic function. Fetal heart rate is regular at 147 bpm. No hydrops.    The parents had appropriate questions. I did my best to answer their questions. I reviewed the echo findings today with Cassandra Langley and her partner. I discussed the fetal cardiac anatomy, function, and physiology.The abnormal results of the fetal echocardiogram were explained to the patient.      Plan:    1. Delivery is recommended at [facility].   2. Cardiology consultation and echocardiogram is recommended within 24 hours of delivery. The cardiac condition does not require prostaglandin therapy.   3. No additional fetal echocardiograms are recommended.     Thank you for allowing me to participate in Cassandra's care.  Feel free to contact me with questions.    I spend 10 minutes counseling the patient about her fetal echocardiogram findings. All of this time was face to face.    Dr Kristen Rodriguez  Pediatric Cardiologist  [facility]  Phone [phone]

## 2021-08-04 NOTE — NURSING NOTE
NST Performed due to new dx of severe FGR, intermittent elevated UAR.  Dr. Keys reviewed efm tracing. See NST/BPP Doc Flowsheet tab.

## 2021-08-05 LAB
GP B STREP DNA SPEC QL NAA+PROBE: POSITIVE
PATIENT PENICILLIN, AMOXICILLIN, CEPHALOSPORINS ALLERGY: YES

## 2021-08-06 ENCOUNTER — OFFICE VISIT (OUTPATIENT)
Dept: MATERNAL FETAL MEDICINE | Facility: CLINIC | Age: 31
End: 2021-08-06
Attending: OBSTETRICS & GYNECOLOGY
Payer: COMMERCIAL

## 2021-08-06 ENCOUNTER — HOSPITAL ENCOUNTER (OUTPATIENT)
Dept: ULTRASOUND IMAGING | Facility: CLINIC | Age: 31
End: 2021-08-06
Attending: OBSTETRICS & GYNECOLOGY
Payer: COMMERCIAL

## 2021-08-06 DIAGNOSIS — O36.5990 PREGNANCY AFFECTED BY FETAL GROWTH RESTRICTION: ICD-10-CM

## 2021-08-06 DIAGNOSIS — O36.5990 PREGNANCY AFFECTED BY FETAL GROWTH RESTRICTION: Primary | ICD-10-CM

## 2021-08-06 DIAGNOSIS — O35.BXX0 FETAL CARDIAC DISEASE AFFECTING PREGNANCY, SINGLE OR UNSPECIFIED FETUS: ICD-10-CM

## 2021-08-06 PROCEDURE — 59025 FETAL NON-STRESS TEST: CPT | Mod: 26 | Performed by: OBSTETRICS & GYNECOLOGY

## 2021-08-06 PROCEDURE — 76815 OB US LIMITED FETUS(S): CPT | Mod: 26 | Performed by: OBSTETRICS & GYNECOLOGY

## 2021-08-06 PROCEDURE — 76815 OB US LIMITED FETUS(S): CPT

## 2021-08-06 PROCEDURE — 76820 UMBILICAL ARTERY ECHO: CPT | Mod: 26 | Performed by: OBSTETRICS & GYNECOLOGY

## 2021-08-06 PROCEDURE — 59025 FETAL NON-STRESS TEST: CPT

## 2021-08-06 NOTE — PROGRESS NOTES
"Please see \"Imaging\" tab under \"Chart Review\" for details of today's visit.    Keith Moreno    "

## 2021-08-08 LAB — BACTERIA SPEC CULT: ABNORMAL

## 2021-08-09 ENCOUNTER — OFFICE VISIT (OUTPATIENT)
Dept: MATERNAL FETAL MEDICINE | Facility: CLINIC | Age: 31
End: 2021-08-09
Attending: OBSTETRICS & GYNECOLOGY
Payer: COMMERCIAL

## 2021-08-09 ENCOUNTER — HOSPITAL ENCOUNTER (OUTPATIENT)
Facility: CLINIC | Age: 31
Discharge: HOME | End: 2021-08-10
Attending: OBSTETRICS & GYNECOLOGY | Admitting: OBSTETRICS & GYNECOLOGY
Payer: COMMERCIAL

## 2021-08-09 ENCOUNTER — HOSPITAL ENCOUNTER (OUTPATIENT)
Dept: ULTRASOUND IMAGING | Facility: CLINIC | Age: 31
End: 2021-08-09
Attending: OBSTETRICS & GYNECOLOGY
Payer: COMMERCIAL

## 2021-08-09 VITALS — SYSTOLIC BLOOD PRESSURE: 162 MMHG | DIASTOLIC BLOOD PRESSURE: 99 MMHG | HEART RATE: 86 BPM | OXYGEN SATURATION: 99 %

## 2021-08-09 DIAGNOSIS — O35.BXX0 FETAL CARDIAC DISEASE AFFECTING PREGNANCY, SINGLE OR UNSPECIFIED FETUS: ICD-10-CM

## 2021-08-09 DIAGNOSIS — O36.5990 PREGNANCY AFFECTED BY FETAL GROWTH RESTRICTION: Primary | ICD-10-CM

## 2021-08-09 PROBLEM — Z36.89 ENCOUNTER FOR TRIAGE IN PREGNANT PATIENT: Status: ACTIVE | Noted: 2021-08-09

## 2021-08-09 PROBLEM — O16.3 ELEVATED BLOOD PRESSURE AFFECTING PREGNANCY IN THIRD TRIMESTER, ANTEPARTUM: Status: ACTIVE | Noted: 2021-08-09

## 2021-08-09 LAB
ABO/RH(D): NORMAL
ALBUMIN SERPL-MCNC: 3 G/DL (ref 3.4–5)
ALP SERPL-CCNC: 140 U/L (ref 40–150)
ALT SERPL W P-5'-P-CCNC: 15 U/L (ref 0–50)
ANION GAP SERPL CALCULATED.3IONS-SCNC: 4 MMOL/L (ref 3–14)
ANTIBODY SCREEN: NEGATIVE
AST SERPL W P-5'-P-CCNC: 14 U/L (ref 0–45)
BILIRUB SERPL-MCNC: 0.1 MG/DL (ref 0.2–1.3)
BUN SERPL-MCNC: 7 MG/DL (ref 7–30)
CALCIUM SERPL-MCNC: 9.7 MG/DL (ref 8.5–10.1)
CHLORIDE BLD-SCNC: 107 MMOL/L (ref 94–109)
CO2 SERPL-SCNC: 25 MMOL/L (ref 20–32)
CREAT SERPL-MCNC: 0.58 MG/DL (ref 0.52–1.04)
CREAT UR-MCNC: 38 MG/DL
ERYTHROCYTE [DISTWIDTH] IN BLOOD BY AUTOMATED COUNT: 13.7 % (ref 10–15)
GFR SERPL CREATININE-BSD FRML MDRD: >90 ML/MIN/1.73M2
GLUCOSE BLD-MCNC: 90 MG/DL (ref 70–99)
HCT VFR BLD AUTO: 41.8 % (ref 35–47)
HGB BLD-MCNC: 13.4 G/DL (ref 11.7–15.7)
HOLD SPECIMEN: NORMAL
MCH RBC QN AUTO: 26.7 PG (ref 26.5–33)
MCHC RBC AUTO-ENTMCNC: 32.1 G/DL (ref 31.5–36.5)
MCV RBC AUTO: 83 FL (ref 78–100)
PLATELET # BLD AUTO: 307 10E3/UL (ref 150–450)
POTASSIUM BLD-SCNC: 4.4 MMOL/L (ref 3.4–5.3)
PROT SERPL-MCNC: 7.9 G/DL (ref 6.8–8.8)
PROT UR-MCNC: 0.1 G/L
PROT/CREAT 24H UR: 0.26 G/G CR (ref 0–0.2)
RBC # BLD AUTO: 5.01 10E6/UL (ref 3.8–5.2)
SODIUM SERPL-SCNC: 136 MMOL/L (ref 133–144)
SPECIMEN EXPIRATION DATE: NORMAL
WBC # BLD AUTO: 9.6 10E3/UL (ref 4–11)

## 2021-08-09 PROCEDURE — 84156 ASSAY OF PROTEIN URINE: CPT | Performed by: OBSTETRICS & GYNECOLOGY

## 2021-08-09 PROCEDURE — 76820 UMBILICAL ARTERY ECHO: CPT | Mod: 26 | Performed by: OBSTETRICS & GYNECOLOGY

## 2021-08-09 PROCEDURE — 76819 FETAL BIOPHYS PROFIL W/O NST: CPT

## 2021-08-09 PROCEDURE — 99242 OFF/OP CONSLTJ NEW/EST SF 20: CPT | Performed by: PEDIATRICS

## 2021-08-09 PROCEDURE — 87635 SARS-COV-2 COVID-19 AMP PRB: CPT | Performed by: OBSTETRICS & GYNECOLOGY

## 2021-08-09 PROCEDURE — 99214 OFFICE O/P EST MOD 30 MIN: CPT | Mod: 25 | Performed by: OBSTETRICS & GYNECOLOGY

## 2021-08-09 PROCEDURE — 120N000002 HC R&B MED SURG/OB UMMC

## 2021-08-09 PROCEDURE — 76818 FETAL BIOPHYS PROFILE W/NST: CPT | Mod: 26 | Performed by: OBSTETRICS & GYNECOLOGY

## 2021-08-09 PROCEDURE — 85027 COMPLETE CBC AUTOMATED: CPT | Performed by: OBSTETRICS & GYNECOLOGY

## 2021-08-09 PROCEDURE — 250N000013 HC RX MED GY IP 250 OP 250 PS 637: Performed by: STUDENT IN AN ORGANIZED HEALTH CARE EDUCATION/TRAINING PROGRAM

## 2021-08-09 PROCEDURE — 36415 COLL VENOUS BLD VENIPUNCTURE: CPT | Performed by: OBSTETRICS & GYNECOLOGY

## 2021-08-09 PROCEDURE — 82040 ASSAY OF SERUM ALBUMIN: CPT | Performed by: OBSTETRICS & GYNECOLOGY

## 2021-08-09 PROCEDURE — 86900 BLOOD TYPING SEROLOGIC ABO: CPT | Performed by: OBSTETRICS & GYNECOLOGY

## 2021-08-09 PROCEDURE — G0463 HOSPITAL OUTPT CLINIC VISIT: HCPCS | Mod: 25

## 2021-08-09 RX ORDER — DOCUSATE SODIUM 100 MG/1
100 CAPSULE, LIQUID FILLED ORAL 2 TIMES DAILY
Status: DISCONTINUED | OUTPATIENT
Start: 2021-08-09 | End: 2021-08-10 | Stop reason: HOSPADM

## 2021-08-09 RX ORDER — ONDANSETRON 4 MG/1
4 TABLET, ORALLY DISINTEGRATING ORAL EVERY 6 HOURS PRN
Status: DISCONTINUED | OUTPATIENT
Start: 2021-08-09 | End: 2021-08-10 | Stop reason: HOSPADM

## 2021-08-09 RX ORDER — DIPHENHYDRAMINE HCL 25 MG
25 CAPSULE ORAL EVERY 6 HOURS PRN
Status: DISCONTINUED | OUTPATIENT
Start: 2021-08-09 | End: 2021-08-10 | Stop reason: HOSPADM

## 2021-08-09 RX ORDER — PRENATAL VIT/IRON FUM/FOLIC AC 27MG-0.8MG
1 TABLET ORAL DAILY
Status: DISCONTINUED | OUTPATIENT
Start: 2021-08-09 | End: 2021-08-10 | Stop reason: HOSPADM

## 2021-08-09 RX ORDER — PROCHLORPERAZINE 25 MG
25 SUPPOSITORY, RECTAL RECTAL EVERY 12 HOURS PRN
Status: DISCONTINUED | OUTPATIENT
Start: 2021-08-09 | End: 2021-08-10 | Stop reason: HOSPADM

## 2021-08-09 RX ORDER — METOCLOPRAMIDE 10 MG/1
10 TABLET ORAL EVERY 6 HOURS PRN
Status: DISCONTINUED | OUTPATIENT
Start: 2021-08-09 | End: 2021-08-10 | Stop reason: HOSPADM

## 2021-08-09 RX ORDER — METOCLOPRAMIDE HYDROCHLORIDE 5 MG/ML
10 INJECTION INTRAMUSCULAR; INTRAVENOUS EVERY 6 HOURS PRN
Status: DISCONTINUED | OUTPATIENT
Start: 2021-08-09 | End: 2021-08-10 | Stop reason: HOSPADM

## 2021-08-09 RX ORDER — PROCHLORPERAZINE MALEATE 10 MG
10 TABLET ORAL EVERY 6 HOURS PRN
Status: DISCONTINUED | OUTPATIENT
Start: 2021-08-09 | End: 2021-08-10 | Stop reason: HOSPADM

## 2021-08-09 RX ORDER — LIDOCAINE 40 MG/G
CREAM TOPICAL
Status: DISCONTINUED | OUTPATIENT
Start: 2021-08-09 | End: 2021-08-09

## 2021-08-09 RX ORDER — ACETAMINOPHEN 325 MG/1
650 TABLET ORAL EVERY 4 HOURS PRN
Status: DISCONTINUED | OUTPATIENT
Start: 2021-08-09 | End: 2021-08-10 | Stop reason: HOSPADM

## 2021-08-09 RX ORDER — ONDANSETRON 2 MG/ML
4 INJECTION INTRAMUSCULAR; INTRAVENOUS EVERY 6 HOURS PRN
Status: DISCONTINUED | OUTPATIENT
Start: 2021-08-09 | End: 2021-08-10 | Stop reason: HOSPADM

## 2021-08-09 RX ORDER — BETAMETHASONE SODIUM PHOSPHATE AND BETAMETHASONE ACETATE 3; 3 MG/ML; MG/ML
12 INJECTION, SUSPENSION INTRA-ARTICULAR; INTRALESIONAL; INTRAMUSCULAR; SOFT TISSUE EVERY 24 HOURS
Status: DISCONTINUED | OUTPATIENT
Start: 2021-08-09 | End: 2021-08-10 | Stop reason: HOSPADM

## 2021-08-09 RX ORDER — DIPHENHYDRAMINE HYDROCHLORIDE 50 MG/ML
25 INJECTION INTRAMUSCULAR; INTRAVENOUS EVERY 6 HOURS PRN
Status: DISCONTINUED | OUTPATIENT
Start: 2021-08-09 | End: 2021-08-10 | Stop reason: HOSPADM

## 2021-08-09 RX ADMIN — DOCUSATE SODIUM 100 MG: 100 CAPSULE, LIQUID FILLED ORAL at 20:16

## 2021-08-09 RX ADMIN — PRENATAL VITAMINS-IRON FUMARATE 27 MG IRON-FOLIC ACID 0.8 MG TABLET 1 TABLET: at 20:16

## 2021-08-09 ASSESSMENT — ACTIVITIES OF DAILY LIVING (ADL)
DIFFICULTY_COMMUNICATING: NO
CONCENTRATING,_REMEMBERING_OR_MAKING_DECISIONS_DIFFICULTY: NO
WALKING_OR_CLIMBING_STAIRS_DIFFICULTY: NO
DIFFICULTY_EATING/SWALLOWING: NO
FALL_HISTORY_WITHIN_LAST_SIX_MONTHS: NO
DOING_ERRANDS_INDEPENDENTLY_DIFFICULTY: NO
TOILETING_ISSUES: NO
DRESSING/BATHING_DIFFICULTY: NO

## 2021-08-09 ASSESSMENT — MIFFLIN-ST. JEOR: SCORE: 1407.15

## 2021-08-09 NOTE — PROVIDER NOTIFICATION
08/09/21 1520   Provider Notification   Provider Name/Title Dr. Montoya   Method of Notification At Bedside   Request Evaluate in Person   Notification Reason Status Update   All of patients' labs were received and based on BPs elevated in clinic Dr. Montoya and Dr. Sanchez would like to admit patient to antepartum for further serial blood pressures with possibility of discharging tomorrow. Pt agreeable with plan and moved to room 422 and report given to REMEDIOS Paredes RN.

## 2021-08-09 NOTE — PROGRESS NOTES
"Please see \"Imaging\" tab under \"Chart Review\" for details of today's US.    Dianne Aguillon, DO    "

## 2021-08-09 NOTE — H&P
[facility]  OB History and Physical      Name: Chayito Langley  MRN#: [id number]    Age: 31 year old  YOB: 1990      CC:  BP evaluation    HPI:  Ms. Chayito Langley is a 31 y.o.  at 33w5d by LMP c/w 9 week sono, who presents to triage for BP evaluation.  The patient was seen in clinic today for BPP, at which time she was noted to have 2 severe range BP.  BP was checked at ultrasound as she had a BP on 21 of 133/86.  She was then sent to triage for further assessment.  She denies headache, visual change, RUQ pain, leg swelling, nausea, vomiting, fever, chills, shortness of breath, chest pain.  She reports irregular estefani rouse contractions but denies painful, regular contractions.  She denies vaginal bleeding or LOF.  Positive fetal movement.    Of note, patient denies hx of chronic hypertension or elevated BP in this pregnancy.    Pregnancy Complications:  1.  Fetal VSD  2. Severe FGR, elevated UA Dopplers    Prenatal Labs:   - Prenatal labs:               Rh: +  antibody: neg               HepB/HIV/RPR: nonreactive               Rubella: immune               GCT: 136  GTT: 83, 155, 113, 73               UC: no growth               Pap: NIL - due 2021  - Immunizations: s/p flu, Tdap, and Covid   - Genetic screening: low-risk NIPT and MSAFP; recommendation for  cord blood microarray.    Ultrasounds  Study Result    Narrative & Impression                                                                                                           BPP  ---------------------------------------------------------------------------------------------------------  Pat. Name:        CHAYITO LANGLEY                                     Study Date:          2021 10:19am  Pat. NO:             [id number]                                                                    Referring  MD:     NARENDRA RIVERA  Site:                   [facility]                                                                                 Sonographer:     Christy Russell RDMS  :                  1990                                                                        Age:                      31     DATING  ---------------------------------------------------------------------------------------------------------  Date                                        Details                                     Gest. age                      RAMIN  LMP                                         2020                             33 w + 5 d                     2021  Prior assessment               2021 GA: 9 w + 6 d    33 w + 3 d                     2021  Assigned dating                  based on the LMP              33 w + 5 d                     2021     GENERAL EVALUATION  ---------------------------------------------------------------------------------------------------------  Cardiac activity present.  bpm.  Fetal movements visualized.  Presentation cephalic.  Placenta Posterior, No Previa, > 2 cm from internal os.  Umbilical cord previously studied.     AMNIOTIC FLUID ASSESSMENT  ---------------------------------------------------------------------------------------------------------  Amount of AF: normal  MVP 3.8 cm     BIOPHYSICAL PROFILE  ---------------------------------------------------------------------------------------------------------  2: Fetal breathing movements  2: Gross body movements  2: Fetal tone  2: Amniotic fluid volume  NST: reactive  10/10 Biophysical profile score  Interpretation: normal     FETAL DOPPLER  ---------------------------------------------------------------------------------------------------------  Umbilical Artery: abnormal, Intermittent Elevated PI.  PI                                            1.38                                                  >99%        Rubio  HR                                          141         "             bpm     NON STRESS TEST  ---------------------------------------------------------------------------------------------------------  NST interpretation: reactive. Test duration 20 min. Baseline  bpm. Baseline variability: moderate. Accelerations: present. Decelerations: absent. Uterine activity:  present                                                   IMPRESSION  ---------------------------------------------------------------------------------------------------------  1) Intrauterine pregnancy at 33 5/7 weeks gestational age.  2) The BPP is reassuring.  3) The amniotic fluid volume appeared normal.  4) The UA Doppler waveform is intermittently elevated.  5) The BPP is reassuring.     PMHx:   Diagnosis Date     Vulvovaginitis       PSHx:   Procedure Laterality Date     NO HISTORY OF SURGERY        Meds:   Medication Sig Dispense Refill Last Dose     Prenatal Vit-Fe Fum-FA-Omega (PRENATAL MULTI +DHA PO) Take 1 tablet by mouth daily   8/8/2021 at Unknown time      Allergies:   Allergen Reactions     Keflex [Cephalosporins]      FmHx:  Problem Relation Age of Onset     Thyroid Disease Mother 59        Thyroid removed      Hyperlipidemia Father      SocHx: She denies any tobacco, alcohol, or other drug use during this pregnancy.    ROS:   Complete 10-point ROS negative except as noted in HPI. She denies headache, blurry vision, chest pain, shortness of breath, RUQ pain, nausea, vomiting, dysuria, hematuria or extremity edema.    PE:  Vit:   Vitals:    08/09/21 1250 08/09/21 1253 08/09/21 1305 08/09/21 1316   BP: 126/85  134/88 (!) 138/95   Resp: 22      Temp: 98.6  F (37  C)      TempSrc: Oral      Weight:  74.4 kg (164 lb)     Height:  1.575 m (5' 2\")       Gen: Well-appearing, NAD, comfortable   CV: Regular rate and rhythm  Pulm: Non labored, clear to auscultation in bilateral fields  Abd: Soft, gravid, non-tender, +BS  Ext: Bilateral lower extremities without edema    Pres:  cephalic by sono " today  EFW:  1563 g by sono 21            FHT: Baseline 130, moderate variability, positive accelerations, absent decelerations   [place]: Irregular contractions      Assessment  Ms. Cassandra Langley  is a 31 y.o.  at 33w5d by LMP c/w 9 week sono, who presents to triage for BP evaluation.  She was noted to have 2 severe range BP in clinic today, 5 min apart in left arm.  Denies history of chronic hypertension or elevated BP this pregnancy.  Asymptomatic.    Plan  1. BP eval  - 2 noted SR BP in clinic today, 5 min apart  - 1 mild range BP in triage with diastolic 95  - asymptomatic  - hgb 13.4, plt 307, creatinine 0.58, ast 14, alt 15, urine protein/creatinine indeterminate at 0.26  - given severe range BP, recommend admission for BP monitoring overnight; if patient with another elevated BP after 1600 will meet criteria for gestational hypertension (if mild range BP) vs preeclampsia w/SF (if SR BP)  - will defer BMTZ at this time    2. Severe FGR  - sono 21 EFW 1563 g, 2%  - BPP today 10/10, MVP 3.8, UA Doppler elevated  - current plan for  surveillance twice weekly (BPP with NST and UA Dopplers on , NST with UA Dopplers on )  - bid NST while admitted     3. Fetal VSD  - mild anterior malalignment VSD with normal flow across the pulmonary valve; good size branch pulmonary artery, normal left and right ventricular size and systolic function, no hydrops  - plan for delivery at [facility] with cardiology consultation and post  echo within 24 hours of delivery  - fetal echo 21; no additional prenatal fetal echocardiograms recommended    Dispo:  Admission to antepartum for BP monitoring     The patient was discussed with Dr. Sanchez who is in agreement with the treatment plan.    Catherine Montoya MD  Maternal-Fetal Medicine Fellow PGY5  2021 2:38 PM     MFM Attending Attestation  I personally evaluated Cassandra Langley with Dr. Montoya and agree with   Jan's findings, assessment and plan of care as documented in the above note. The plan of care was formulated by me.  I personally reviewed vital signs, laboratory results, imaging and orders  as well as fetal monitoring. See note for details; I have made the necessary edits/additions    Ana Sanchez MD  , OB/GYN  Maternal-Fetal Medicine  [email or web address]  [phone] (Main M Office)  [phone] or [phone] (for 24 hour [facility] questions)  [phone] (Pager)      Time Spent on this Encounter   I spent a total 30 minutes on the encounter with Cassandra Langley today   More than 50% of my time was spent on counseling and/or coordination of care, with a total of 10 minutes was spent face to face with Cassandra Langley today   - Counseling the patient and/or family regarding: diagnosis, diagnostic results, prognosis and risks and benefits of management options   - Coordination of care with the: nurse and patient    Date of service (when I saw the patient): August 9, 2021

## 2021-08-09 NOTE — NURSING NOTE
"Cassandra here for BPP/UAR/NST d/t to fetal CHD, FGR.Fetal strip reviewed with Dr. Aguillon, see flowsheet. Occasional contraction noted on monitor, pt states she is not feeling them or \"it feels like FM,\" palpate soft. Pt ed reinforced about low threshold for contacting provider for regular cxn pattern, change in discharge, pt VU. Pt also seen by Dr. Magallanes and Uyen Cole for NICU/SW consult, see their notes.  BP check d/t borderline BP last week per Dr. Keys, severe elevation x2 today. Pt denies pre-e symptoms today. Pt wheeled to triage for evaluation, Dr. Sanchez, L&D aware.   "

## 2021-08-09 NOTE — PLAN OF CARE
Patient continues with observation for rule out pre-e. BPs have been 130s-/80s-90s. Patient denies headache, vision changes, epigastric pain. Continue with plan of care.

## 2021-08-10 VITALS
WEIGHT: 167.1 LBS | HEIGHT: 62 IN | TEMPERATURE: 98.3 F | BODY MASS INDEX: 30.75 KG/M2 | DIASTOLIC BLOOD PRESSURE: 70 MMHG | SYSTOLIC BLOOD PRESSURE: 121 MMHG | RESPIRATION RATE: 18 BRPM

## 2021-08-10 DIAGNOSIS — O13.3 GESTATIONAL HYPERTENSION, THIRD TRIMESTER: ICD-10-CM

## 2021-08-10 DIAGNOSIS — O09.899 POSITIVE FETAL FIBRONECTIN AT 22 WEEKS TO 34 WEEKS GESTATION: ICD-10-CM

## 2021-08-10 DIAGNOSIS — O36.5990 PREGNANCY AFFECTED BY FETAL GROWTH RESTRICTION: Primary | ICD-10-CM

## 2021-08-10 DIAGNOSIS — R87.89 POSITIVE FETAL FIBRONECTIN AT 22 WEEKS TO 34 WEEKS GESTATION: ICD-10-CM

## 2021-08-10 LAB
ALBUMIN SERPL-MCNC: 2.8 G/DL (ref 3.4–5)
ALP SERPL-CCNC: 130 U/L (ref 40–150)
ALT SERPL W P-5'-P-CCNC: 13 U/L (ref 0–50)
ALT SERPL W P-5'-P-CCNC: 13 U/L (ref 0–50)
ANION GAP SERPL CALCULATED.3IONS-SCNC: 7 MMOL/L (ref 3–14)
AST SERPL W P-5'-P-CCNC: 11 U/L (ref 0–45)
AST SERPL W P-5'-P-CCNC: 11 U/L (ref 0–45)
BASOPHILS # BLD AUTO: 0 10E3/UL (ref 0–0.2)
BASOPHILS NFR BLD AUTO: 0 %
BILIRUB SERPL-MCNC: 0.2 MG/DL (ref 0.2–1.3)
BUN SERPL-MCNC: 6 MG/DL (ref 7–30)
CALCIUM SERPL-MCNC: 9.2 MG/DL (ref 8.5–10.1)
CHLORIDE BLD-SCNC: 106 MMOL/L (ref 94–109)
CO2 SERPL-SCNC: 24 MMOL/L (ref 20–32)
CREAT SERPL-MCNC: 0.57 MG/DL (ref 0.52–1.04)
CREAT SERPL-MCNC: 0.57 MG/DL (ref 0.52–1.04)
EOSINOPHIL # BLD AUTO: 0.2 10E3/UL (ref 0–0.7)
EOSINOPHIL NFR BLD AUTO: 2 %
ERYTHROCYTE [DISTWIDTH] IN BLOOD BY AUTOMATED COUNT: 13.6 % (ref 10–15)
GFR SERPL CREATININE-BSD FRML MDRD: >90 ML/MIN/1.73M2
GFR SERPL CREATININE-BSD FRML MDRD: >90 ML/MIN/1.73M2
GLUCOSE BLD-MCNC: 79 MG/DL (ref 70–99)
HCT VFR BLD AUTO: 41.4 % (ref 35–47)
HGB BLD-MCNC: 13.2 G/DL (ref 11.7–15.7)
IMM GRANULOCYTES # BLD: 0 10E3/UL
IMM GRANULOCYTES NFR BLD: 0 %
LYMPHOCYTES # BLD AUTO: 2.2 10E3/UL (ref 0.8–5.3)
LYMPHOCYTES NFR BLD AUTO: 23 %
MCH RBC QN AUTO: 26.3 PG (ref 26.5–33)
MCHC RBC AUTO-ENTMCNC: 31.9 G/DL (ref 31.5–36.5)
MCV RBC AUTO: 83 FL (ref 78–100)
MONOCYTES # BLD AUTO: 0.7 10E3/UL (ref 0–1.3)
MONOCYTES NFR BLD AUTO: 7 %
NEUTROPHILS # BLD AUTO: 6.6 10E3/UL (ref 1.6–8.3)
NEUTROPHILS NFR BLD AUTO: 68 %
NRBC # BLD AUTO: 0 10E3/UL
NRBC BLD AUTO-RTO: 0 /100
PLATELET # BLD AUTO: 288 10E3/UL (ref 150–450)
POTASSIUM BLD-SCNC: 4.2 MMOL/L (ref 3.4–5.3)
PROT SERPL-MCNC: 7.4 G/DL (ref 6.8–8.8)
RBC # BLD AUTO: 5.02 10E6/UL (ref 3.8–5.2)
SARS-COV-2 RNA RESP QL NAA+PROBE: NEGATIVE
SODIUM SERPL-SCNC: 137 MMOL/L (ref 133–144)
WBC # BLD AUTO: 9.7 10E3/UL (ref 4–11)

## 2021-08-10 PROCEDURE — 82040 ASSAY OF SERUM ALBUMIN: CPT | Performed by: STUDENT IN AN ORGANIZED HEALTH CARE EDUCATION/TRAINING PROGRAM

## 2021-08-10 PROCEDURE — 250N000013 HC RX MED GY IP 250 OP 250 PS 637: Performed by: STUDENT IN AN ORGANIZED HEALTH CARE EDUCATION/TRAINING PROGRAM

## 2021-08-10 PROCEDURE — 82565 ASSAY OF CREATININE: CPT | Performed by: STUDENT IN AN ORGANIZED HEALTH CARE EDUCATION/TRAINING PROGRAM

## 2021-08-10 PROCEDURE — 59025 FETAL NON-STRESS TEST: CPT | Mod: 26 | Performed by: OBSTETRICS & GYNECOLOGY

## 2021-08-10 PROCEDURE — 96372 THER/PROPH/DIAG INJ SC/IM: CPT | Performed by: STUDENT IN AN ORGANIZED HEALTH CARE EDUCATION/TRAINING PROGRAM

## 2021-08-10 PROCEDURE — 250N000011 HC RX IP 250 OP 636: Performed by: STUDENT IN AN ORGANIZED HEALTH CARE EDUCATION/TRAINING PROGRAM

## 2021-08-10 PROCEDURE — 84450 TRANSFERASE (AST) (SGOT): CPT | Performed by: STUDENT IN AN ORGANIZED HEALTH CARE EDUCATION/TRAINING PROGRAM

## 2021-08-10 PROCEDURE — 99213 OFFICE O/P EST LOW 20 MIN: CPT | Mod: 25 | Performed by: OBSTETRICS & GYNECOLOGY

## 2021-08-10 PROCEDURE — 36415 COLL VENOUS BLD VENIPUNCTURE: CPT | Performed by: STUDENT IN AN ORGANIZED HEALTH CARE EDUCATION/TRAINING PROGRAM

## 2021-08-10 PROCEDURE — 84460 ALANINE AMINO (ALT) (SGPT): CPT | Performed by: STUDENT IN AN ORGANIZED HEALTH CARE EDUCATION/TRAINING PROGRAM

## 2021-08-10 PROCEDURE — 85025 COMPLETE CBC W/AUTO DIFF WBC: CPT | Performed by: STUDENT IN AN ORGANIZED HEALTH CARE EDUCATION/TRAINING PROGRAM

## 2021-08-10 RX ORDER — BETAMETHASONE SODIUM PHOSPHATE AND BETAMETHASONE ACETATE 3; 3 MG/ML; MG/ML
12 INJECTION, SUSPENSION INTRA-ARTICULAR; INTRALESIONAL; INTRAMUSCULAR; SOFT TISSUE ONCE
Status: DISCONTINUED | OUTPATIENT
Start: 2021-08-11 | End: 2021-08-10

## 2021-08-10 RX ADMIN — DOCUSATE SODIUM 100 MG: 100 CAPSULE, LIQUID FILLED ORAL at 10:34

## 2021-08-10 RX ADMIN — BETAMETHASONE SODIUM PHOSPHATE AND BETAMETHASONE ACETATE 12 MG: 3; 3 INJECTION, SUSPENSION INTRA-ARTICULAR; INTRALESIONAL; INTRAMUSCULAR at 08:52

## 2021-08-10 RX ADMIN — PRENATAL VITAMINS-IRON FUMARATE 27 MG IRON-FOLIC ACID 0.8 MG TABLET 1 TABLET: at 10:34

## 2021-08-10 ASSESSMENT — MIFFLIN-ST. JEOR: SCORE: 1421.21

## 2021-08-10 NOTE — DISCHARGE INSTRUCTIONS
Discharge Instruction for Undelivered Patients      You were seen for: blood pressure evaluation  We Consulted:  Dr. Pillai  You had (Test or Medicine): uterine and fetal monitoring, labs     Diet:   Drink 8 to 12 glasses of liquids (milk, juice, water) every day.  You may eat meals and snacks.     Activity:  Call your doctor or nurse midwife if your baby is moving less than usual.     Call your provider if you notice:  Swelling in your face or increased swelling in your hands or legs.  Headaches that are not relieved by Tylenol (acetaminophen).  Changes in your vision (blurring: seeing spots or stars.)  Nausea (sick to your stomach) and vomiting (throwing up).   Weight gain of 5 pounds or more per week.  Heartburn that doesn't go away.  Signs of bladder infection: pain when you urinate (use the toilet), need to go more often and more urgently.  The bag of colon (rupture of membranes) breaks, or you notice leaking in your underwear.  Bright red blood in your underwear.  Abdominal (lower belly) or stomach pain.  For first baby: Contractions (tightening) less than 5 minutes apart for one hour or more.  Second (plus) baby: Contractions (tightening) less than 10 minutes apart and getting stronger.  *If less than 34 weeks: Contractions (tightening) more than 6 times in one hour.  Increase or change in vaginal discharge (note the color and amount)  Other:     Follow-up:     As scheduled in clinic.    Steroid shot tomorrow at 9 a.m. at [facility]. Go to same unit where you receive ultrasounds.

## 2021-08-10 NOTE — PROVIDER NOTIFICATION
08/09/21 0661   Provider Notification   Provider Name/Title Dr. De León   Method of Notification At Bedside   Request Evaluate in Person   Notification Reason Other (Comment)  (pt. wanting to discuss betamethasone injection)   Plan to give first beta in the morning per Dr. De León.

## 2021-08-10 NOTE — DISCHARGE SUMMARY
[facility] Discharge Summary    Cassandra Langley MRN# [id number]   Age: 31 year old YOB: 1990     Date of Admission:  2021  Date of Discharge:  08/10/21   Admitting Physician:  Ana Sanchez MD  Discharge Physician:  Kayy Pillai MD      Admission Diagnosis:  Elevated BP   Severe FGR  Fetal VSD     Discharge Diagnosis:  Gestational HTN   Severe FGR  Fetal VSD     Procedures:    None     Consultations:    None      Brief History of Presentation:    Ms. Cassandra Langley is a 31 y.o.  at 33w5d by LMP c/w 9 week sono, who presented to triage for BP evaluation.  The patient was seen in clinic today for BPP, at which time she was noted to have 2 severe range BP.       Hospital Course:    The patient remained asymptomatic with normal to mild range blood pressures through overnight monitoring. Two sets of preeclampsia labs remained WNL and UPC was not elevated > 0.3. She met criteria for gestational HTN during her admission and on HD#2 was deemed stable for discharge with plan for close follow up.       Discharge Instructions:  Call or present to labor and delivery if you experience:   -Regular painful contractions concerning for labor   -Leakage of fluid concerning for ruptured membranes   -Decreased fetal movement   -Bright red vaginal bleeding    -Headache, vision changes, upper abdominal pain, significant increase in swelling,   generalized unwell feeling    Follow up:  Betamethasone #2 arranged at nurse visit  at 0900.   Follow up in [facility] clinic as scheduled on  with twice weekly BPP/dopplers.       Discharge Medications:     Review of your medicines      CONTINUE these medicines which have NOT CHANGED      Dose / Directions   PRENATAL MULTI +DHA PO      Dose: 1 tablet  Take 1 tablet by mouth daily  Refills: 0            Joyce Shirley MD  Obstetrics & Gynecology, PGY-3  08/10/2021 10:59 AM

## 2021-08-10 NOTE — PLAN OF CARE
Pt eager for discharge. Discharge instructions given. Pt to receive 2nd betamethasone tomorrow at [facility]. Discharge to home and self care with .

## 2021-08-10 NOTE — PROGRESS NOTES
"[facility] Antepartum Progress Note    Subjective:   No headache, vision changes, SOB/CP or RUQ abdominal pain. Would like to receive betamethasone for fetal lung maturity. Has a blood pressure cuff at home and is comfortable with plan to discharge to home.       Objective:  Patient Vitals for the past 24 hrs:   BP Temp Temp src Resp Height Weight   08/10/21 0854 121/70 -- -- -- -- --   08/10/21 0549 130/79 98.3  F (36.8  C) Oral 18 -- 75.8 kg (167 lb 1.6 oz)   08/10/21 0138 (!) 137/92 -- -- 18 -- --   21 2340 135/87 98.2  F (36.8  C) Oral 18 -- --   21 2120 (!) 134/97 -- -- 18 -- --   21 133/84 97.7  F (36.5  C) Oral 16 -- --   21 1900 (!) 133/96 -- -- -- -- --   21 1803 132/86 -- -- -- -- --   21 1703 (!) 133/93 -- -- -- -- --   21 1542 (!) 136/93 97.9  F (36.6  C) Oral 18 -- --   21 1440 138/84 -- -- 18 -- --   21 1316 (!) 138/95 -- -- -- -- --   21 1305 134/88 -- -- -- -- --   21 1253 -- -- -- -- 1.575 m (5' 2\") 74.4 kg (164 lb)   21 1250 126/85 98.6  F (37  C) Oral 22 -- --   21 1235 129/87 -- -- -- -- --       I/O last 3 completed shifts:  In: 1450 [P.O.:1450]  Out: 1800 [Urine:1800]    Gen: Resting comfortably in bed, NAD  CV: RRR, no murmurs  Resp: CTAB, no wheezes, no crackles  Abd: Gravid, non-tender, non-distended  Ext: non-tender, no edema    FHT: , moderate variability, + accels, no decels  [place]: quiet    Recent Labs   Lab Test 08/10/21  0650 21  1316 21  0912 20  1200   HGB 13.2 13.4 11.9 13.3    307 271 356   AST 11  11 14  --  19   ALT 13  13 15  --  35   CR 0.57  0.57 0.58  --  0.84       Assessment/Plan:   Cassandra Langley is a 31 year old  @ 33w6d by LMP c/w 9w US who is HD#2 for blood pressure monitoring.  Since admission she meets criteria for gestational HTN. Today she is stable for discharge to home with plan for close follow up and BP monitoring.     Plan  1. Gestational  " HTN   - Blood pressures overnight normal to mild range   - Asymptomatic  - Preeclampsia labs WNL, as above, with UPC 0.26  - Given increased possibility of iatrogenic PTD, will give betamethasone course today      2. Severe FGR  - sono 21 EFW 1563 g, 2%  - BPP  was 10/10, MVP 3.8, UA Doppler elevated  - current plan for  surveillance twice weekly (BPP with NST and UA Dopplers on , NST with UA Dopplers on )  - NST reactive and reassuring      3. Fetal VSD  - mild anterior malalignment VSD with normal flow across the pulmonary valve; good size branch pulmonary artery, normal left and right ventricular size and systolic function, no hydrops  - plan for delivery at [facility] with cardiology consultation and post  echo within 24 hours of delivery  - fetal echo 21; no additional prenatal fetal echocardiograms recommended     Joyce Shirley MD  Obstetrics & Gynecology, PGY-3  08/10/2021 10:51 AM

## 2021-08-10 NOTE — UTILIZATION REVIEW
"  Admission Status; Secondary Review Determination         Under the authority of the Utilization Management Committee, the utilization review process indicated a secondary review on the above patient.  The review outcome is based on review of the medical records, discussions with staff, and applying clinical experience noted on the date of the review.          (x) Observation Status Appropriate - This patient does not meet hospital inpatient criteria and is placed in observation status. If this patient's primary payer is Medicare and was admitted as an inpatient, Condition Code 44 should be used and patient status changed to \"observation\".     RATIONALE FOR DETERMINATION     The severity of illness, intensity of service provided, expected LOS and risk for adverse outcome make the care appropriate for further observation; however, doesn't meet criteria for hospital inpatient admission.   notified of this determination.    The patient is a 31-year-old female admitted on 2021.  Patient was seen for a BPP and was noted to have 2 severe range blood pressures and was sent to the hospital.  The patient was monitored overnight and was found to have normal to mild range blood pressure increases through overnight monitoring.  2 sets of preeclampsia labs were within normal limits.  Patient was deemed stable to be discharged and was discharged and given betamethasone injection for lung maturity and was scheduled for her second injection from home health care tomorrow.  Patient was given discharge instructions and recommendations.  Based on 1 day stay, the patient is appropriate to be observation rather than inpatient status.  Patient is a 31-year-old  1 para 0 at 33 weeks and 6 days by last menstrual period.      The information on this document is developed by the utilization review team in order for the business office to ensure compliance.  This only denotes the appropriateness of proper admission " status and does not reflect the quality of care rendered.         The definitions of Inpatient Status and Observation Status used in making the determination above are those provided in the CMS Coverage Manual, Chapter 1 and Chapter 6, section 70.4.      Sincerely,     Espinoza Crockett MD  Physician Advisor  Utilization Review/ Case Management  [facility].

## 2021-08-10 NOTE — PLAN OF CARE
Pt and  eager for discharge. MDs in to discuss same. Pt offers no complaints. Beta given and geovanna well. MDs arranging for 2nd beta at [facility]. Awaiting discharge order from MD.

## 2021-08-10 NOTE — PLAN OF CARE
VSS. Pt. Able to sleep during the night between checks. BP's still mild range 130's/80-90's. Denies s/s of pre-eclampsia. Denies contractions, cramping, LOF, or pain. See flowsheet for monitoring. +FM. Plan to give first dose of betamethasone this morning and repeat labs. COVID neg. Pt. Offers no complaints at this time. Educated when to call for RN. Verbalizes understanding. Continue plan of care.

## 2021-08-11 ENCOUNTER — ALLIED HEALTH/NURSE VISIT (OUTPATIENT)
Dept: MATERNAL FETAL MEDICINE | Facility: CLINIC | Age: 31
End: 2021-08-11
Attending: OBSTETRICS & GYNECOLOGY
Payer: COMMERCIAL

## 2021-08-11 VITALS — DIASTOLIC BLOOD PRESSURE: 87 MMHG | SYSTOLIC BLOOD PRESSURE: 136 MMHG

## 2021-08-11 DIAGNOSIS — O09.899 POSITIVE FETAL FIBRONECTIN AT 22 WEEKS TO 34 WEEKS GESTATION: Primary | ICD-10-CM

## 2021-08-11 DIAGNOSIS — R87.89 POSITIVE FETAL FIBRONECTIN AT 22 WEEKS TO 34 WEEKS GESTATION: ICD-10-CM

## 2021-08-11 DIAGNOSIS — O09.899 POSITIVE FETAL FIBRONECTIN AT 22 WEEKS TO 34 WEEKS GESTATION: ICD-10-CM

## 2021-08-11 DIAGNOSIS — O36.5990 PREGNANCY AFFECTED BY FETAL GROWTH RESTRICTION: ICD-10-CM

## 2021-08-11 DIAGNOSIS — O13.3 GESTATIONAL HYPERTENSION, THIRD TRIMESTER: ICD-10-CM

## 2021-08-11 DIAGNOSIS — R87.89 POSITIVE FETAL FIBRONECTIN AT 22 WEEKS TO 34 WEEKS GESTATION: Primary | ICD-10-CM

## 2021-08-11 PROCEDURE — 250N000011 HC RX IP 250 OP 636: Performed by: OBSTETRICS & GYNECOLOGY

## 2021-08-11 PROCEDURE — 96372 THER/PROPH/DIAG INJ SC/IM: CPT | Performed by: OBSTETRICS & GYNECOLOGY

## 2021-08-11 RX ORDER — BETAMETHASONE SODIUM PHOSPHATE AND BETAMETHASONE ACETATE 3; 3 MG/ML; MG/ML
12 INJECTION, SUSPENSION INTRA-ARTICULAR; INTRALESIONAL; INTRAMUSCULAR; SOFT TISSUE ONCE
Status: COMPLETED | OUTPATIENT
Start: 2021-08-11 | End: 2021-08-11

## 2021-08-11 RX ORDER — BETAMETHASONE SODIUM PHOSPHATE AND BETAMETHASONE ACETATE 3; 3 MG/ML; MG/ML
12 INJECTION, SUSPENSION INTRA-ARTICULAR; INTRALESIONAL; INTRAMUSCULAR; SOFT TISSUE ONCE
Status: DISCONTINUED | OUTPATIENT
Start: 2021-08-11 | End: 2021-08-11

## 2021-08-11 RX ORDER — BETAMETHASONE SODIUM PHOSPHATE AND BETAMETHASONE ACETATE 3; 3 MG/ML; MG/ML
12 INJECTION, SUSPENSION INTRA-ARTICULAR; INTRALESIONAL; INTRAMUSCULAR; SOFT TISSUE ONCE
Status: CANCELLED | OUTPATIENT
Start: 2021-08-11

## 2021-08-11 RX ADMIN — BETAMETHASONE SODIUM PHOSPHATE AND BETAMETHASONE ACETATE 12 MG: 3; 3 INJECTION, SUSPENSION INTRA-ARTICULAR; INTRALESIONAL; INTRAMUSCULAR at 09:01

## 2021-08-11 NOTE — PROGRESS NOTES
Here second dose of  corticosteroid.  Patient asymptomatic. /87 mmHg.    Gopal Reyes M.D.  Maternal Fetal Medicine

## 2021-08-11 NOTE — NURSING NOTE
Pt arrived for second dose of Bethamethasone. Medication safety checks completed. Pt given Beta IM at 0901 left ventrogluteal site. Pt tolerated well. Pt requested to have BP taken since BP's were more elevated previous days. Dr. Reyes updated and okay with assessment of BP.     /87 on left arm. Pt denies any headache, visual changes, nausea/vomiting, or epigastric pain.     Pt discharged home.

## 2021-08-13 ENCOUNTER — HOSPITAL ENCOUNTER (OUTPATIENT)
Dept: ULTRASOUND IMAGING | Facility: CLINIC | Age: 31
End: 2021-08-13
Attending: OBSTETRICS & GYNECOLOGY
Payer: COMMERCIAL

## 2021-08-13 ENCOUNTER — OFFICE VISIT (OUTPATIENT)
Dept: MATERNAL FETAL MEDICINE | Facility: CLINIC | Age: 31
End: 2021-08-13
Attending: OBSTETRICS & GYNECOLOGY
Payer: COMMERCIAL

## 2021-08-13 VITALS — SYSTOLIC BLOOD PRESSURE: 133 MMHG | DIASTOLIC BLOOD PRESSURE: 86 MMHG

## 2021-08-13 DIAGNOSIS — O35.BXX0 FETAL CARDIAC DISEASE AFFECTING PREGNANCY, SINGLE OR UNSPECIFIED FETUS: ICD-10-CM

## 2021-08-13 DIAGNOSIS — O36.5990 PREGNANCY AFFECTED BY FETAL GROWTH RESTRICTION: ICD-10-CM

## 2021-08-13 DIAGNOSIS — O36.5930 POOR FETAL GROWTH AFFECTING MANAGEMENT OF MOTHER IN THIRD TRIMESTER, SINGLE OR UNSPECIFIED FETUS: Primary | ICD-10-CM

## 2021-08-13 PROCEDURE — 76820 UMBILICAL ARTERY ECHO: CPT | Mod: 26 | Performed by: OBSTETRICS & GYNECOLOGY

## 2021-08-13 PROCEDURE — 59025 FETAL NON-STRESS TEST: CPT | Mod: 26 | Performed by: OBSTETRICS & GYNECOLOGY

## 2021-08-13 PROCEDURE — 76820 UMBILICAL ARTERY ECHO: CPT

## 2021-08-13 PROCEDURE — 76815 OB US LIMITED FETUS(S): CPT | Mod: 26 | Performed by: OBSTETRICS & GYNECOLOGY

## 2021-08-13 PROCEDURE — 59025 FETAL NON-STRESS TEST: CPT

## 2021-08-16 ENCOUNTER — APPOINTMENT (OUTPATIENT)
Dept: LAB | Facility: CLINIC | Age: 31
End: 2021-08-16
Attending: OBSTETRICS & GYNECOLOGY
Payer: COMMERCIAL

## 2021-08-16 ENCOUNTER — OFFICE VISIT (OUTPATIENT)
Dept: MATERNAL FETAL MEDICINE | Facility: CLINIC | Age: 31
End: 2021-08-16
Attending: OBSTETRICS & GYNECOLOGY
Payer: COMMERCIAL

## 2021-08-16 ENCOUNTER — HOSPITAL ENCOUNTER (OUTPATIENT)
Dept: ULTRASOUND IMAGING | Facility: CLINIC | Age: 31
End: 2021-08-16
Attending: OBSTETRICS & GYNECOLOGY
Payer: COMMERCIAL

## 2021-08-16 VITALS
SYSTOLIC BLOOD PRESSURE: 144 MMHG | RESPIRATION RATE: 16 BRPM | HEART RATE: 91 BPM | WEIGHT: 166 LBS | BODY MASS INDEX: 30.36 KG/M2 | OXYGEN SATURATION: 100 % | DIASTOLIC BLOOD PRESSURE: 95 MMHG

## 2021-08-16 DIAGNOSIS — O09.93 SUPERVISION OF HIGH RISK PREGNANCY IN THIRD TRIMESTER: Primary | ICD-10-CM

## 2021-08-16 DIAGNOSIS — O35.BXX0 FETAL CARDIAC DISEASE AFFECTING PREGNANCY, SINGLE OR UNSPECIFIED FETUS: ICD-10-CM

## 2021-08-16 DIAGNOSIS — O36.5930 POOR FETAL GROWTH AFFECTING MANAGEMENT OF MOTHER IN THIRD TRIMESTER, SINGLE OR UNSPECIFIED FETUS: ICD-10-CM

## 2021-08-16 DIAGNOSIS — O36.5990 PREGNANCY AFFECTED BY FETAL GROWTH RESTRICTION: ICD-10-CM

## 2021-08-16 DIAGNOSIS — O13.3 GESTATIONAL HYPERTENSION, THIRD TRIMESTER: ICD-10-CM

## 2021-08-16 LAB
ALT SERPL W P-5'-P-CCNC: 18 U/L (ref 0–50)
AST SERPL W P-5'-P-CCNC: 12 U/L (ref 0–45)
CREAT SERPL-MCNC: 0.71 MG/DL (ref 0.52–1.04)
CREAT UR-MCNC: 86 MG/DL
ERYTHROCYTE [DISTWIDTH] IN BLOOD BY AUTOMATED COUNT: 14.1 % (ref 10–15)
GFR SERPL CREATININE-BSD FRML MDRD: >90 ML/MIN/1.73M2
HCT VFR BLD AUTO: 42.8 % (ref 35–47)
HGB BLD-MCNC: 13.6 G/DL (ref 11.7–15.7)
MCH RBC QN AUTO: 26.6 PG (ref 26.5–33)
MCHC RBC AUTO-ENTMCNC: 31.8 G/DL (ref 31.5–36.5)
MCV RBC AUTO: 84 FL (ref 78–100)
PLATELET # BLD AUTO: 362 10E3/UL (ref 150–450)
PROT UR-MCNC: 0.19 G/L
PROT/CREAT 24H UR: 0.22 G/G CR (ref 0–0.2)
RBC # BLD AUTO: 5.12 10E6/UL (ref 3.8–5.2)
WBC # BLD AUTO: 11.3 10E3/UL (ref 4–11)

## 2021-08-16 PROCEDURE — 76816 OB US FOLLOW-UP PER FETUS: CPT

## 2021-08-16 PROCEDURE — 82565 ASSAY OF CREATININE: CPT | Performed by: ADVANCED PRACTICE MIDWIFE

## 2021-08-16 PROCEDURE — 76819 FETAL BIOPHYS PROFIL W/O NST: CPT

## 2021-08-16 PROCEDURE — 99212 OFFICE O/P EST SF 10 MIN: CPT | Mod: 25 | Performed by: ADVANCED PRACTICE MIDWIFE

## 2021-08-16 PROCEDURE — 36415 COLL VENOUS BLD VENIPUNCTURE: CPT | Performed by: ADVANCED PRACTICE MIDWIFE

## 2021-08-16 PROCEDURE — 84156 ASSAY OF PROTEIN URINE: CPT | Performed by: ADVANCED PRACTICE MIDWIFE

## 2021-08-16 PROCEDURE — 84450 TRANSFERASE (AST) (SGOT): CPT | Performed by: ADVANCED PRACTICE MIDWIFE

## 2021-08-16 PROCEDURE — 85027 COMPLETE CBC AUTOMATED: CPT | Performed by: ADVANCED PRACTICE MIDWIFE

## 2021-08-16 PROCEDURE — 76816 OB US FOLLOW-UP PER FETUS: CPT | Mod: 26 | Performed by: OBSTETRICS & GYNECOLOGY

## 2021-08-16 PROCEDURE — G0463 HOSPITAL OUTPT CLINIC VISIT: HCPCS | Mod: 25

## 2021-08-16 PROCEDURE — 84460 ALANINE AMINO (ALT) (SGPT): CPT | Performed by: ADVANCED PRACTICE MIDWIFE

## 2021-08-16 PROCEDURE — 76820 UMBILICAL ARTERY ECHO: CPT | Mod: 26 | Performed by: OBSTETRICS & GYNECOLOGY

## 2021-08-16 PROCEDURE — 76820 UMBILICAL ARTERY ECHO: CPT

## 2021-08-16 PROCEDURE — 76819 FETAL BIOPHYS PROFIL W/O NST: CPT | Mod: 26 | Performed by: OBSTETRICS & GYNECOLOGY

## 2021-08-16 ASSESSMENT — PAIN SCALES - GENERAL: PAINLEVEL: NO PAIN (0)

## 2021-08-16 ASSESSMENT — PATIENT HEALTH QUESTIONNAIRE - PHQ9: SUM OF ALL RESPONSES TO PHQ QUESTIONS 1-9: 4

## 2021-08-16 NOTE — PROGRESS NOTES
"Please see \"Imaging\" tab under \"Chart Review\" for details of today's visit.    Ana De La Fuente MD PhD  Maternal Fetal Medicine     "

## 2021-08-16 NOTE — PROGRESS NOTES
"Maternal fetal Medicine OB Follow up visit.     Cassandra Langley  : 1990  MRN: [id number]    CC: OB Follow-up    Subjective:  Cassandra Langley is a 31 year old  at 34w5d presenting for routine OB follow-up. Today, she is feeling well. Has questions about timing of delivery and what to expect with an induction. She has cut back on her hours at work - only working 3 days per week or half days.    Patient denies regular, painful contractions, denies loss of fluid or vaginal bleeding.  Reports fetal movement.      Patient also denies any recent fevers/chills, headaches or changes in vision, RUQ pain, nausea or vomiting, constipation, diarrhea or other systemic symptoms. She is checking her BP at home daily with most values in the 140s/90s. Did have one higher pressure of 150s/100s, but rested and her re-check was lower.     OB Hx:  OB History    Para Term  AB Living   1 0 0 0 0 0   SAB TAB Ectopic Multiple Live Births   0 0 0 0 0      # Outcome Date GA Lbr Nato/2nd Weight Sex Delivery Anes PTL Lv   1 Current                  Objective:  BP (!) 144/95 (BP Location: Left arm, Patient Position: Sitting, Cuff Size: Adult Large)   Pulse 91   Resp 16   Wt 75.3 kg (166 lb)   LMP 2020   SpO2 100%   BMI 30.36 kg/m      Gen: alert, oriented, NAD  Skin: warm, dry, intact  Respiratory: breathing unlabored, no SOB  Abdominal: gravid, non-tender  Pelvic: deferred  Extremities: WNL  Psych: mood WNL, behavior WNL      OB Ultrasound:  Please see \"imaging\" tab under chart review for today's ultrasound results.      Assessment/Plan:  31 year old  at 34w5d here for follow OB visit.    Pregnancy has been complicated by:   Maternal dx:  - GHTN  - GBS+    Fetal dx:  - Severe FGR with abnormal dopplers  - CHD: VSD    #CHD:  #Severe FGR:   - Mild anterior malaignment ventricular septal defect with normal flow across the pulmonary valve. Good size branch pulmonary artery. Normal left and " Right ventricular size and systolic function. No further echos indicated at this time.  - Cardiology consult and echo within 24 hours after birth  - Twice weekly limited US with UA dopplers and NST.    #GHTN:  - Recent overnight stay in L&D for BP monitoring. Did receive BMZ x2 due to increased risk for PTB.  - Ongoing assessment of s/s and preeclampsia.   - Continue with at-home BP monitoring daily  - Weekly HELLP labs pending  - Twice weekly BPPs    #Routine PNC:  - Prenatal labs:               Rh: +  antibody: neg               HepB/HIV/RPR: nonreactive               Rubella: immune               GCT: 136  GTT: 83, 155, 113, 73               UC: no growth               Pap: NIL - due 2021  - Immunizations: s/p flu, Tdap, and Covid   - Genetic screening: low-risk NIPT and MSAFP; recommendation for  cord blood microarray. Plan for GC visit at upcoming clinic visit to discuss this option.  - GBS positive    # Surveillance:  - Growth US today: EFW: 1966g (5%tile)  - Twice weekly limited US with UA dopplers and NST.  - Twice weekly BPPs    #Delivery planning:  - In the setting of severe FGR and GHTN, delivery is recommended at 37 weeks. Could be indicated sooner if  testing is non-reassuring or patient were to develop preeclampsia w/o SF.  Desires midwifery care for intrapartum management. IOL scheduled for 21 at 0800.   - Reviewed typical timeline and process for induction. Discussed cervical ripening methods, use of pitocin, etc.   - NICU for delivery. Reviewed increased risk for NICU admission given CHD and severe FGR.   - Hopes to avoid pain medication while in labor, but aware of pharmacologic pain management options.  - Feeding: breastfeeding  - Contraception: either NuvaRing or IUD, plans to discuss with her primary OB after delivery.     RTC in 1 week    15 minutes spent on the date of the encounter, doing chart review, history and exam, documentation and further activities as  noted.      Lissette Garcia CNM on 8/16/2021 at 11:19 AM

## 2021-08-16 NOTE — NURSING NOTE
Cassandra seen in clinic today with spouse Ty for RL2/BPP/UAR/NST, JOHNNY visit at 34w5d gestation for pregnancy complicated by severe FGR, VSD, GHTN (see report/notes). VSS. Pt reports positive fetal movement, see flowsheet. Pt denies bldg/lof/change in discharge/contractions/headache/vision changes/chest pain/SOB/edema. Dr. De La Fuente, Lissette RICEM met with pt and discussed POC. Plan for labs today. Future visits scheduled through delivery. Will need BP check/outpt labs at 8/23 visit, orders placed, pt aware. Pt discharged stable and ambulatory.

## 2021-08-17 NOTE — TELEPHONE ENCOUNTER
2021    Cassandra returned my call and we discussed the recommendation of microarray testing on cord blood at delivery for the indication of fetal tetrology of fallot with pediatric genetics to follow up based on results.  We discussed the benefits and limitations of genetic testing, and discussed how a positive result might impact recommendations for further evaluation if a diagnosis is made.  Specifically we discussed 22q11.2 deletion syndrome (sometimes referred to as DiGeorge Syndrome) as one possible genetic condition that is associated with tets.  We discussed the possible outcomes of genetic testing, positive, negative, and uncertain, and how each might impact care for her .  All of Cassandra's questions were answered to her satisfaction and she will complete a consent for testing at her next [facility] appointment with one of our genetic counselors.      Michael Higgins MS, [facility]  Licensed Genetic Counselor  Phone: [phone]  Pager: [phone]

## 2021-08-20 ENCOUNTER — OFFICE VISIT (OUTPATIENT)
Dept: MATERNAL FETAL MEDICINE | Facility: CLINIC | Age: 31
End: 2021-08-20
Attending: OBSTETRICS & GYNECOLOGY
Payer: COMMERCIAL

## 2021-08-20 ENCOUNTER — HOSPITAL ENCOUNTER (OUTPATIENT)
Dept: ULTRASOUND IMAGING | Facility: CLINIC | Age: 31
End: 2021-08-20
Attending: OBSTETRICS & GYNECOLOGY
Payer: COMMERCIAL

## 2021-08-20 ENCOUNTER — DOCUMENTATION ONLY (OUTPATIENT)
Dept: MATERNAL FETAL MEDICINE | Facility: CLINIC | Age: 31
End: 2021-08-20

## 2021-08-20 DIAGNOSIS — O36.5930 POOR FETAL GROWTH AFFECTING MANAGEMENT OF MOTHER IN THIRD TRIMESTER, SINGLE OR UNSPECIFIED FETUS: ICD-10-CM

## 2021-08-20 DIAGNOSIS — O35.BXX0 FETAL CARDIAC DISEASE AFFECTING PREGNANCY, SINGLE OR UNSPECIFIED FETUS: ICD-10-CM

## 2021-08-20 DIAGNOSIS — O36.5990 PREGNANCY AFFECTED BY FETAL GROWTH RESTRICTION: ICD-10-CM

## 2021-08-20 PROCEDURE — 76818 FETAL BIOPHYS PROFILE W/NST: CPT

## 2021-08-20 PROCEDURE — 76818 FETAL BIOPHYS PROFILE W/NST: CPT | Mod: 26 | Performed by: OBSTETRICS & GYNECOLOGY

## 2021-08-20 PROCEDURE — 76819 FETAL BIOPHYS PROFIL W/O NST: CPT

## 2021-08-20 PROCEDURE — 76820 UMBILICAL ARTERY ECHO: CPT | Mod: 26 | Performed by: OBSTETRICS & GYNECOLOGY

## 2021-08-20 NOTE — PROGRESS NOTES
2021    Consent obtained for  cord blood microarray testing today. Consent form will be scanned into Cassandra's chart. Testing was previously discussed in detail with Michael Higgins CGC, over the phone. Please see his phone notes for details.     Wendy Forrest CGC  Genetic Counselor  [facility]  Maternal Fetal Medicine  Phone: [phone]  [email or web address]

## 2021-08-20 NOTE — PROGRESS NOTES
Consent for  genetic testing completed at [facility] appointment today, orders for fetal cord blood testing signed and held in fetal chart per pediatric genetics recommendations.      Michael Higgins MS, [facility]  Licensed Genetic Counselor  Phone: [phone]  Pager: [phone]

## 2021-08-20 NOTE — PROGRESS NOTES
"Please see \"Imaging\" tab under \"Chart Review\" for details of today's ultrasound.    Keven Loza M.D.  Specialist in Maternal-Fetal Medicine     "

## 2021-08-23 ENCOUNTER — OFFICE VISIT (OUTPATIENT)
Dept: MATERNAL FETAL MEDICINE | Facility: CLINIC | Age: 31
End: 2021-08-23
Attending: OBSTETRICS & GYNECOLOGY
Payer: COMMERCIAL

## 2021-08-23 ENCOUNTER — HOSPITAL ENCOUNTER (OUTPATIENT)
Dept: ULTRASOUND IMAGING | Facility: CLINIC | Age: 31
End: 2021-08-23
Attending: OBSTETRICS & GYNECOLOGY
Payer: COMMERCIAL

## 2021-08-23 ENCOUNTER — LAB (OUTPATIENT)
Dept: LAB | Facility: CLINIC | Age: 31
End: 2021-08-23
Attending: OBSTETRICS & GYNECOLOGY
Payer: COMMERCIAL

## 2021-08-23 VITALS — HEART RATE: 99 BPM | SYSTOLIC BLOOD PRESSURE: 137 MMHG | DIASTOLIC BLOOD PRESSURE: 84 MMHG

## 2021-08-23 DIAGNOSIS — O36.5930 POOR FETAL GROWTH AFFECTING MANAGEMENT OF MOTHER IN THIRD TRIMESTER, SINGLE OR UNSPECIFIED FETUS: Primary | ICD-10-CM

## 2021-08-23 DIAGNOSIS — O36.5930 POOR FETAL GROWTH AFFECTING MANAGEMENT OF MOTHER IN THIRD TRIMESTER, SINGLE OR UNSPECIFIED FETUS: ICD-10-CM

## 2021-08-23 DIAGNOSIS — O16.3 ELEVATED BLOOD PRESSURE AFFECTING PREGNANCY IN THIRD TRIMESTER, ANTEPARTUM: Primary | ICD-10-CM

## 2021-08-23 DIAGNOSIS — O13.3 GESTATIONAL HYPERTENSION, THIRD TRIMESTER: ICD-10-CM

## 2021-08-23 DIAGNOSIS — O35.BXX0 FETAL CARDIAC DISEASE AFFECTING PREGNANCY, SINGLE OR UNSPECIFIED FETUS: ICD-10-CM

## 2021-08-23 DIAGNOSIS — O36.5990 PREGNANCY AFFECTED BY FETAL GROWTH RESTRICTION: ICD-10-CM

## 2021-08-23 LAB
ALT SERPL W P-5'-P-CCNC: 21 U/L (ref 0–50)
AST SERPL W P-5'-P-CCNC: 14 U/L (ref 0–45)
CREAT SERPL-MCNC: 0.62 MG/DL (ref 0.52–1.04)
CREAT UR-MCNC: 81 MG/DL
ERYTHROCYTE [DISTWIDTH] IN BLOOD BY AUTOMATED COUNT: 14.4 % (ref 10–15)
GFR SERPL CREATININE-BSD FRML MDRD: >90 ML/MIN/1.73M2
HCT VFR BLD AUTO: 44.4 % (ref 35–47)
HGB BLD-MCNC: 14 G/DL (ref 11.7–15.7)
MCH RBC QN AUTO: 27.1 PG (ref 26.5–33)
MCHC RBC AUTO-ENTMCNC: 31.5 G/DL (ref 31.5–36.5)
MCV RBC AUTO: 86 FL (ref 78–100)
PLATELET # BLD AUTO: 315 10E3/UL (ref 150–450)
PROT UR-MCNC: 0.21 G/L
PROT/CREAT 24H UR: 0.26 G/G CR (ref 0–0.2)
RBC # BLD AUTO: 5.17 10E6/UL (ref 3.8–5.2)
WBC # BLD AUTO: 9.9 10E3/UL (ref 4–11)

## 2021-08-23 PROCEDURE — 84156 ASSAY OF PROTEIN URINE: CPT

## 2021-08-23 PROCEDURE — 82565 ASSAY OF CREATININE: CPT

## 2021-08-23 PROCEDURE — 84450 TRANSFERASE (AST) (SGOT): CPT

## 2021-08-23 PROCEDURE — 76818 FETAL BIOPHYS PROFILE W/NST: CPT | Mod: 26 | Performed by: OBSTETRICS & GYNECOLOGY

## 2021-08-23 PROCEDURE — 59025 FETAL NON-STRESS TEST: CPT

## 2021-08-23 PROCEDURE — 76819 FETAL BIOPHYS PROFIL W/O NST: CPT

## 2021-08-23 PROCEDURE — 85014 HEMATOCRIT: CPT

## 2021-08-23 PROCEDURE — 76820 UMBILICAL ARTERY ECHO: CPT | Mod: 26 | Performed by: OBSTETRICS & GYNECOLOGY

## 2021-08-23 PROCEDURE — 36415 COLL VENOUS BLD VENIPUNCTURE: CPT

## 2021-08-23 PROCEDURE — 84460 ALANINE AMINO (ALT) (SGPT): CPT

## 2021-08-23 NOTE — NURSING NOTE
NST performed due to FGR.  Dr. Carr reviewed efm tracing. See NST/BPP Doc Flowsheet tab. Refer to MD notes in M ultrasound report from today.  Patient will proceed to lab after [facility] appointment to have [facility] labs drawn as preplanned. BP today was 137/84. MD aware of result.

## 2021-08-23 NOTE — PROGRESS NOTES
"Please see \"Imaging\" tab under \"Chart Review\" for details of today's US at the [facility].    Tico Carr MD  Maternal-Fetal Medicine      "

## 2021-08-24 NOTE — PROGRESS NOTES
"NICU Consultation    I had the opportunity to meet with Ms. Cassandra Langley and her partner Ty for  consultation in the [facility] clinic at the [facility] at the request of Dr Keys. Ms. Langley is currently 33 weeks pregnant with a fetus for whom they have selected the name \"Sarita.\"  Sarita has a suspected anterior malalignment VSD (previously thought to represent pink TOF) as well as severe fetal growth restriction.    We discussed the NICU resuscitation team that will be present at her delivery.  We discussed common issues of prematurity given the significant growth restriction that may prompt early delivery.  I described the process of obtaining a post hammad ECHO to confirm the cardiac diagnoses with additional pediatric cardiology consultation to assist with treatment decisions in the  period.  The family is aware that many babies will go home before a definitive surgical repair if they are feeding and growing well.      We also discussed the layout of the NICU, the supports available, the rounding team structure, and ways that information is shared.  We reviewed the COVID visitor restrictions.  All questions were answered at the completion of the visit.      We look forward to caring for the infant of Ms. Cassandra Langley in the in NICU at the [facility].  Please reach out if there are questions.     Jes Magallanes MD  Neonatology    Total time of visit: 30 mins with 100% of time in direct patient counseling.    "

## 2021-08-27 ENCOUNTER — OFFICE VISIT (OUTPATIENT)
Dept: MATERNAL FETAL MEDICINE | Facility: CLINIC | Age: 31
End: 2021-08-27
Attending: OBSTETRICS & GYNECOLOGY
Payer: COMMERCIAL

## 2021-08-27 ENCOUNTER — HOSPITAL ENCOUNTER (OUTPATIENT)
Dept: ULTRASOUND IMAGING | Facility: CLINIC | Age: 31
End: 2021-08-27
Attending: OBSTETRICS & GYNECOLOGY
Payer: COMMERCIAL

## 2021-08-27 VITALS — DIASTOLIC BLOOD PRESSURE: 91 MMHG | HEART RATE: 87 BPM | SYSTOLIC BLOOD PRESSURE: 135 MMHG

## 2021-08-27 DIAGNOSIS — O36.5990 PREGNANCY AFFECTED BY FETAL GROWTH RESTRICTION: ICD-10-CM

## 2021-08-27 DIAGNOSIS — O35.BXX0 FETAL CARDIAC DISEASE AFFECTING PREGNANCY, SINGLE OR UNSPECIFIED FETUS: ICD-10-CM

## 2021-08-27 DIAGNOSIS — O36.5930 POOR FETAL GROWTH AFFECTING MANAGEMENT OF MOTHER IN THIRD TRIMESTER, SINGLE OR UNSPECIFIED FETUS: ICD-10-CM

## 2021-08-27 DIAGNOSIS — O36.5930 POOR FETAL GROWTH AFFECTING MANAGEMENT OF MOTHER IN THIRD TRIMESTER, SINGLE OR UNSPECIFIED FETUS: Primary | ICD-10-CM

## 2021-08-27 PROCEDURE — 76820 UMBILICAL ARTERY ECHO: CPT | Mod: 26 | Performed by: OBSTETRICS & GYNECOLOGY

## 2021-08-27 PROCEDURE — 76818 FETAL BIOPHYS PROFILE W/NST: CPT

## 2021-08-27 PROCEDURE — 76818 FETAL BIOPHYS PROFILE W/NST: CPT | Mod: 26 | Performed by: OBSTETRICS & GYNECOLOGY

## 2021-08-27 NOTE — NURSING NOTE
B/P checked in clinic today as patient has GHTN. See VS for details, reviewed B/P with Dr. Pillai, no new orders. Patient declines headache, visual changes or epigastric pain.

## 2021-08-27 NOTE — PROGRESS NOTES
The patient was seen for an ultrasound in the Maternal-Fetal Medicine Center at the [facility] today.  For a detailed report of the ultrasound examination, please see the ultrasound report which can be found under the imaging tab.    Kayy Pillai MD  , OB/GYN  Maternal-Fetal Medicine  [phone] (Pager)

## 2021-08-28 ENCOUNTER — LAB (OUTPATIENT)
Dept: LAB | Facility: CLINIC | Age: 31
End: 2021-08-28
Attending: ADVANCED PRACTICE MIDWIFE
Payer: COMMERCIAL

## 2021-08-28 DIAGNOSIS — O09.93 SUPERVISION OF HIGH RISK PREGNANCY IN THIRD TRIMESTER: ICD-10-CM

## 2021-08-28 DIAGNOSIS — O36.5990 PREGNANCY AFFECTED BY FETAL GROWTH RESTRICTION: ICD-10-CM

## 2021-08-28 DIAGNOSIS — O35.BXX0 FETAL CARDIAC DISEASE AFFECTING PREGNANCY, SINGLE OR UNSPECIFIED FETUS: ICD-10-CM

## 2021-08-28 DIAGNOSIS — O13.3 GESTATIONAL HYPERTENSION, THIRD TRIMESTER: ICD-10-CM

## 2021-08-28 LAB — SARS-COV-2 RNA RESP QL NAA+PROBE: NEGATIVE

## 2021-08-28 PROCEDURE — 87635 SARS-COV-2 COVID-19 AMP PRB: CPT

## 2021-08-30 ENCOUNTER — LAB (OUTPATIENT)
Dept: LAB | Facility: CLINIC | Age: 31
End: 2021-08-30
Attending: OBSTETRICS & GYNECOLOGY
Payer: COMMERCIAL

## 2021-08-30 ENCOUNTER — OFFICE VISIT (OUTPATIENT)
Dept: MATERNAL FETAL MEDICINE | Facility: CLINIC | Age: 31
End: 2021-08-30
Attending: OBSTETRICS & GYNECOLOGY
Payer: COMMERCIAL

## 2021-08-30 ENCOUNTER — HOSPITAL ENCOUNTER (OUTPATIENT)
Dept: ULTRASOUND IMAGING | Facility: CLINIC | Age: 31
End: 2021-08-30
Attending: OBSTETRICS & GYNECOLOGY
Payer: COMMERCIAL

## 2021-08-30 VITALS
HEART RATE: 81 BPM | RESPIRATION RATE: 18 BRPM | SYSTOLIC BLOOD PRESSURE: 138 MMHG | OXYGEN SATURATION: 97 % | DIASTOLIC BLOOD PRESSURE: 96 MMHG

## 2021-08-30 DIAGNOSIS — O36.5990 PREGNANCY AFFECTED BY FETAL GROWTH RESTRICTION: ICD-10-CM

## 2021-08-30 DIAGNOSIS — O36.5930 POOR FETAL GROWTH AFFECTING MANAGEMENT OF MOTHER IN THIRD TRIMESTER, SINGLE OR UNSPECIFIED FETUS: ICD-10-CM

## 2021-08-30 DIAGNOSIS — O16.3 ELEVATED BLOOD PRESSURE AFFECTING PREGNANCY IN THIRD TRIMESTER, ANTEPARTUM: ICD-10-CM

## 2021-08-30 LAB
ALT SERPL W P-5'-P-CCNC: 18 U/L (ref 0–50)
AST SERPL W P-5'-P-CCNC: 12 U/L (ref 0–45)
CREAT SERPL-MCNC: 0.6 MG/DL (ref 0.52–1.04)
CREAT UR-MCNC: 13 MG/DL
ERYTHROCYTE [DISTWIDTH] IN BLOOD BY AUTOMATED COUNT: 14.7 % (ref 10–15)
GFR SERPL CREATININE-BSD FRML MDRD: >90 ML/MIN/1.73M2
HCT VFR BLD AUTO: 42.2 % (ref 35–47)
HGB BLD-MCNC: 13.5 G/DL (ref 11.7–15.7)
MCH RBC QN AUTO: 27.3 PG (ref 26.5–33)
MCHC RBC AUTO-ENTMCNC: 32 G/DL (ref 31.5–36.5)
MCV RBC AUTO: 85 FL (ref 78–100)
PLATELET # BLD AUTO: 278 10E3/UL (ref 150–450)
PROT UR-MCNC: 0.11 G/L
PROT/CREAT 24H UR: 0.85 G/G CR (ref 0–0.2)
RBC # BLD AUTO: 4.95 10E6/UL (ref 3.8–5.2)
WBC # BLD AUTO: 10.3 10E3/UL (ref 4–11)

## 2021-08-30 PROCEDURE — 76818 FETAL BIOPHYS PROFILE W/NST: CPT | Mod: 26 | Performed by: OBSTETRICS & GYNECOLOGY

## 2021-08-30 PROCEDURE — 84460 ALANINE AMINO (ALT) (SGPT): CPT

## 2021-08-30 PROCEDURE — 36415 COLL VENOUS BLD VENIPUNCTURE: CPT

## 2021-08-30 PROCEDURE — 84156 ASSAY OF PROTEIN URINE: CPT

## 2021-08-30 PROCEDURE — 84450 TRANSFERASE (AST) (SGOT): CPT

## 2021-08-30 PROCEDURE — 82565 ASSAY OF CREATININE: CPT

## 2021-08-30 PROCEDURE — 85027 COMPLETE CBC AUTOMATED: CPT

## 2021-08-30 PROCEDURE — 76819 FETAL BIOPHYS PROFIL W/O NST: CPT

## 2021-08-30 PROCEDURE — 76820 UMBILICAL ARTERY ECHO: CPT | Mod: 26 | Performed by: OBSTETRICS & GYNECOLOGY

## 2021-08-30 NOTE — NURSING NOTE
NST Performed due to FGR and failed BPP.   reviewed efm tracing. See NST/BPP Doc Flowsheet tab. BP checked - see flowsheet. Patient will go to outpatient lab for lab draw.    Karen Garza RN

## 2021-08-30 NOTE — PROGRESS NOTES
Please see full imaging report from [employer] program under imaging tab.    Madyson Swift MD  Maternal Fetal Medicine

## 2021-08-31 ENCOUNTER — TELEPHONE (OUTPATIENT)
Dept: MATERNAL FETAL MEDICINE | Facility: CLINIC | Age: 31
End: 2021-08-31

## 2021-08-31 NOTE — TELEPHONE ENCOUNTER
"Called Cassandra to discuss UPC result, screened for sx. Pt feels \"great\" and excited for tomorrow. Denies HA, URQ pain, visual changes. Has some hand swelling that is stable from what she had yesterday in clinic. States she discussed this with [facility] staff in clinic and they were not concerned. Discussed that she now meets criteria for Pre-E w/o SF, plan is still IOL tomorrow unless she develops sx or LOF today. Pt VU. Reviewed where to present tomorrow.   "

## 2021-09-01 ENCOUNTER — HOSPITAL ENCOUNTER (INPATIENT)
Facility: CLINIC | Age: 31
LOS: 2 days | Discharge: HOME | End: 2021-09-03
Attending: MIDWIFE | Admitting: MIDWIFE
Payer: COMMERCIAL

## 2021-09-01 ENCOUNTER — HOSPITAL ENCOUNTER (OUTPATIENT)
Dept: OBGYN | Facility: CLINIC | Age: 31
End: 2021-09-01
Attending: MIDWIFE
Payer: COMMERCIAL

## 2021-09-01 LAB
ABO/RH(D): NORMAL
ALT SERPL W P-5'-P-CCNC: 20 U/L (ref 0–50)
ANION GAP SERPL CALCULATED.3IONS-SCNC: 4 MMOL/L (ref 3–14)
ANTIBODY SCREEN: NEGATIVE
AST SERPL W P-5'-P-CCNC: 33 U/L (ref 0–45)
BASOPHILS # BLD AUTO: 0 10E3/UL (ref 0–0.2)
BASOPHILS NFR BLD AUTO: 0 %
BUN SERPL-MCNC: 9 MG/DL (ref 7–30)
CALCIUM SERPL-MCNC: 9.2 MG/DL (ref 8.5–10.1)
CHLORIDE BLD-SCNC: 107 MMOL/L (ref 94–109)
CO2 SERPL-SCNC: 24 MMOL/L (ref 20–32)
CREAT SERPL-MCNC: 0.5 MG/DL (ref 0.52–1.04)
EOSINOPHIL # BLD AUTO: 0 10E3/UL (ref 0–0.7)
EOSINOPHIL NFR BLD AUTO: 0 %
ERYTHROCYTE [DISTWIDTH] IN BLOOD BY AUTOMATED COUNT: 14.7 % (ref 10–15)
GFR SERPL CREATININE-BSD FRML MDRD: >90 ML/MIN/1.73M2
GLUCOSE BLD-MCNC: 95 MG/DL (ref 70–99)
HCT VFR BLD AUTO: 40.9 % (ref 35–47)
HGB BLD-MCNC: 13.6 G/DL (ref 11.7–15.7)
IMM GRANULOCYTES # BLD: 0 10E3/UL
IMM GRANULOCYTES NFR BLD: 0 %
LYMPHOCYTES # BLD AUTO: 1.8 10E3/UL (ref 0.8–5.3)
LYMPHOCYTES NFR BLD AUTO: 19 %
MCH RBC QN AUTO: 27.5 PG (ref 26.5–33)
MCHC RBC AUTO-ENTMCNC: 33.3 G/DL (ref 31.5–36.5)
MCV RBC AUTO: 83 FL (ref 78–100)
MONOCYTES # BLD AUTO: 0.6 10E3/UL (ref 0–1.3)
MONOCYTES NFR BLD AUTO: 7 %
NEUTROPHILS # BLD AUTO: 6.9 10E3/UL (ref 1.6–8.3)
NEUTROPHILS NFR BLD AUTO: 74 %
NRBC # BLD AUTO: 0 10E3/UL
NRBC BLD AUTO-RTO: 0 /100
PLATELET # BLD AUTO: 285 10E3/UL (ref 150–450)
POTASSIUM BLD-SCNC: 4.7 MMOL/L (ref 3.4–5.3)
RBC # BLD AUTO: 4.94 10E6/UL (ref 3.8–5.2)
RUPTURE OF FETAL MEMBRANES BY ROM PLUS: NEGATIVE
SODIUM SERPL-SCNC: 135 MMOL/L (ref 133–144)
SPECIMEN EXPIRATION DATE: NORMAL
T PALLIDUM AB SER QL: NONREACTIVE
URATE SERPL-MCNC: 5 MG/DL (ref 2.6–6)
WBC # BLD AUTO: 9.5 10E3/UL (ref 4–11)

## 2021-09-01 PROCEDURE — 250N000013 HC RX MED GY IP 250 OP 250 PS 637: Performed by: MIDWIFE

## 2021-09-01 PROCEDURE — 84112 EVAL AMNIOTIC FLUID PROTEIN: CPT | Performed by: MIDWIFE

## 2021-09-01 PROCEDURE — 722N000001 HC LABOR CARE VAGINAL DELIVERY SINGLE

## 2021-09-01 PROCEDURE — 250N000011 HC RX IP 250 OP 636: Performed by: MIDWIFE

## 2021-09-01 PROCEDURE — 250N000009 HC RX 250: Performed by: MIDWIFE

## 2021-09-01 PROCEDURE — 85025 COMPLETE CBC W/AUTO DIFF WBC: CPT | Performed by: MIDWIFE

## 2021-09-01 PROCEDURE — 84550 ASSAY OF BLOOD/URIC ACID: CPT | Performed by: MIDWIFE

## 2021-09-01 PROCEDURE — 86900 BLOOD TYPING SEROLOGIC ABO: CPT | Performed by: MIDWIFE

## 2021-09-01 PROCEDURE — 86780 TREPONEMA PALLIDUM: CPT | Performed by: MIDWIFE

## 2021-09-01 PROCEDURE — 36415 COLL VENOUS BLD VENIPUNCTURE: CPT | Performed by: MIDWIFE

## 2021-09-01 PROCEDURE — 120N000002 HC R&B MED SURG/OB UMMC

## 2021-09-01 PROCEDURE — 80048 BASIC METABOLIC PNL TOTAL CA: CPT | Performed by: MIDWIFE

## 2021-09-01 PROCEDURE — 84460 ALANINE AMINO (ALT) (SGPT): CPT | Performed by: MIDWIFE

## 2021-09-01 PROCEDURE — 0HQ9XZZ REPAIR PERINEUM SKIN, EXTERNAL APPROACH: ICD-10-PCS | Performed by: MIDWIFE

## 2021-09-01 PROCEDURE — 84450 TRANSFERASE (AST) (SGOT): CPT | Performed by: MIDWIFE

## 2021-09-01 PROCEDURE — 0UQMXZZ REPAIR VULVA, EXTERNAL APPROACH: ICD-10-PCS | Performed by: MIDWIFE

## 2021-09-01 RX ORDER — MISOPROSTOL 100 UG/1
25 TABLET ORAL EVERY 4 HOURS PRN
Status: DISCONTINUED | OUTPATIENT
Start: 2021-09-01 | End: 2021-09-01

## 2021-09-01 RX ORDER — CARBOPROST TROMETHAMINE 250 UG/ML
250 INJECTION, SOLUTION INTRAMUSCULAR
Status: DISCONTINUED | OUTPATIENT
Start: 2021-09-01 | End: 2021-09-02

## 2021-09-01 RX ORDER — IBUPROFEN 600 MG/1
600 TABLET, FILM COATED ORAL
Status: DISCONTINUED | OUTPATIENT
Start: 2021-09-01 | End: 2021-09-02

## 2021-09-01 RX ORDER — OXYTOCIN 10 [USP'U]/ML
10 INJECTION, SOLUTION INTRAMUSCULAR; INTRAVENOUS
Status: DISCONTINUED | OUTPATIENT
Start: 2021-09-01 | End: 2021-09-02

## 2021-09-01 RX ORDER — MISOPROSTOL 200 UG/1
800 TABLET ORAL
Status: DISCONTINUED | OUTPATIENT
Start: 2021-09-01 | End: 2021-09-02

## 2021-09-01 RX ORDER — NALOXONE HYDROCHLORIDE 0.4 MG/ML
0.2 INJECTION, SOLUTION INTRAMUSCULAR; INTRAVENOUS; SUBCUTANEOUS
Status: DISCONTINUED | OUTPATIENT
Start: 2021-09-01 | End: 2021-09-02

## 2021-09-01 RX ORDER — PROCHLORPERAZINE MALEATE 10 MG
10 TABLET ORAL EVERY 6 HOURS PRN
Status: DISCONTINUED | OUTPATIENT
Start: 2021-09-01 | End: 2021-09-02

## 2021-09-01 RX ORDER — PROCHLORPERAZINE 25 MG
25 SUPPOSITORY, RECTAL RECTAL EVERY 12 HOURS PRN
Status: DISCONTINUED | OUTPATIENT
Start: 2021-09-01 | End: 2021-09-02

## 2021-09-01 RX ORDER — OXYTOCIN/0.9 % SODIUM CHLORIDE 30/500 ML
340 PLASTIC BAG, INJECTION (ML) INTRAVENOUS CONTINUOUS PRN
Status: COMPLETED | OUTPATIENT
Start: 2021-09-01 | End: 2021-09-01

## 2021-09-01 RX ORDER — LIDOCAINE HYDROCHLORIDE 10 MG/ML
INJECTION, SOLUTION EPIDURAL; INFILTRATION; INTRACAUDAL; PERINEURAL
Status: DISCONTINUED
Start: 2021-09-01 | End: 2021-09-02 | Stop reason: HOSPADM

## 2021-09-01 RX ORDER — ONDANSETRON 4 MG/1
4 TABLET, ORALLY DISINTEGRATING ORAL EVERY 6 HOURS PRN
Status: DISCONTINUED | OUTPATIENT
Start: 2021-09-01 | End: 2021-09-02

## 2021-09-01 RX ORDER — METHYLERGONOVINE MALEATE 0.2 MG/ML
200 INJECTION INTRAVENOUS
Status: DISCONTINUED | OUTPATIENT
Start: 2021-09-01 | End: 2021-09-02

## 2021-09-01 RX ORDER — NALOXONE HYDROCHLORIDE 0.4 MG/ML
0.4 INJECTION, SOLUTION INTRAMUSCULAR; INTRAVENOUS; SUBCUTANEOUS
Status: DISCONTINUED | OUTPATIENT
Start: 2021-09-01 | End: 2021-09-02

## 2021-09-01 RX ORDER — KETOROLAC TROMETHAMINE 30 MG/ML
30 INJECTION, SOLUTION INTRAMUSCULAR; INTRAVENOUS
Status: DISCONTINUED | OUTPATIENT
Start: 2021-09-01 | End: 2021-09-02

## 2021-09-01 RX ORDER — MISOPROSTOL 200 UG/1
400 TABLET ORAL
Status: DISCONTINUED | OUTPATIENT
Start: 2021-09-01 | End: 2021-09-02

## 2021-09-01 RX ORDER — METOCLOPRAMIDE HYDROCHLORIDE 5 MG/ML
10 INJECTION INTRAMUSCULAR; INTRAVENOUS EVERY 6 HOURS PRN
Status: DISCONTINUED | OUTPATIENT
Start: 2021-09-01 | End: 2021-09-02

## 2021-09-01 RX ORDER — FENTANYL CITRATE 50 UG/ML
50-100 INJECTION, SOLUTION INTRAMUSCULAR; INTRAVENOUS
Status: DISCONTINUED | OUTPATIENT
Start: 2021-09-01 | End: 2021-09-02

## 2021-09-01 RX ORDER — OXYTOCIN/0.9 % SODIUM CHLORIDE 30/500 ML
PLASTIC BAG, INJECTION (ML) INTRAVENOUS
Status: DISCONTINUED
Start: 2021-09-01 | End: 2021-09-02 | Stop reason: HOSPADM

## 2021-09-01 RX ORDER — OXYTOCIN/0.9 % SODIUM CHLORIDE 30/500 ML
1-24 PLASTIC BAG, INJECTION (ML) INTRAVENOUS CONTINUOUS
Status: DISCONTINUED | OUTPATIENT
Start: 2021-09-01 | End: 2021-09-02

## 2021-09-01 RX ORDER — TRANEXAMIC ACID 10 MG/ML
1 INJECTION, SOLUTION INTRAVENOUS EVERY 30 MIN PRN
Status: DISCONTINUED | OUTPATIENT
Start: 2021-09-01 | End: 2021-09-02

## 2021-09-01 RX ORDER — METOCLOPRAMIDE 10 MG/1
10 TABLET ORAL EVERY 6 HOURS PRN
Status: DISCONTINUED | OUTPATIENT
Start: 2021-09-01 | End: 2021-09-02

## 2021-09-01 RX ORDER — MISOPROSTOL 200 UG/1
TABLET ORAL
Status: DISCONTINUED
Start: 2021-09-01 | End: 2021-09-02 | Stop reason: WASHOUT

## 2021-09-01 RX ORDER — OXYTOCIN 10 [USP'U]/ML
INJECTION, SOLUTION INTRAMUSCULAR; INTRAVENOUS
Status: DISCONTINUED
Start: 2021-09-01 | End: 2021-09-02 | Stop reason: WASHOUT

## 2021-09-01 RX ORDER — ONDANSETRON 2 MG/ML
4 INJECTION INTRAMUSCULAR; INTRAVENOUS EVERY 6 HOURS PRN
Status: DISCONTINUED | OUTPATIENT
Start: 2021-09-01 | End: 2021-09-02

## 2021-09-01 RX ORDER — OXYTOCIN/0.9 % SODIUM CHLORIDE 30/500 ML
100-340 PLASTIC BAG, INJECTION (ML) INTRAVENOUS CONTINUOUS PRN
Status: DISCONTINUED | OUTPATIENT
Start: 2021-09-01 | End: 2021-09-02

## 2021-09-01 RX ORDER — LIDOCAINE 40 MG/G
CREAM TOPICAL
Status: DISCONTINUED | OUTPATIENT
Start: 2021-09-01 | End: 2021-09-02

## 2021-09-01 RX ORDER — CLINDAMYCIN PHOSPHATE 900 MG/50ML
900 INJECTION, SOLUTION INTRAVENOUS EVERY 8 HOURS
Status: DISCONTINUED | OUTPATIENT
Start: 2021-09-01 | End: 2021-09-02

## 2021-09-01 RX ADMIN — CLINDAMYCIN PHOSPHATE 900 MG: 900 INJECTION, SOLUTION INTRAVENOUS at 20:21

## 2021-09-01 RX ADMIN — LIDOCAINE HYDROCHLORIDE 20 ML: 10 INJECTION, SOLUTION EPIDURAL; INFILTRATION; INTRACAUDAL; PERINEURAL at 22:56

## 2021-09-01 RX ADMIN — Medication 340 ML/HR: at 22:50

## 2021-09-01 RX ADMIN — Medication 25 MCG: at 10:46

## 2021-09-01 NOTE — PROGRESS NOTES
"Labor progress note    S:  Pt is agreeable to reassessment  Now  Reviewed rock balloon placement     O:  Blood pressure 131/80, temperature 98.6  F (37  C), temperature source Oral, resp. rate 16, height 1.575 m (5' 2\"), weight 77.2 kg (170 lb 1.6 oz), last menstrual period 12/16/2020, not currently breastfeeding.  General appearance: comfortable.  Contractions: Every 2-3 minutes. 40-50 seconds duration.  Palpate: cramping.  FHT: Baseline 135 with mod  variability. Accelerations are present. No  decelerations present.  ROM: not ruptured.   Pelvic exam: 1/ 70%/ Posterior/ average/ -1  Spann Score: 6  Pitocin- none,  Antibiotics- none      A:  30 yo G1 IUP @ 37w0d IOL preeclampsia without severe feature    Fetal Heart rate tracing Category category one  GBS- negative  Patient Active Problem List   Diagnosis     Supervision of high risk pregnancy in third trimester     Fetal cardiac anomaly affecting pregnancy, antepartum     Encounter for triage in pregnant patient     Elevated blood pressure affecting pregnancy in third trimester, antepartum     Labor and delivery, indication for care         P:  cervical ripening with cervical rock bulb     GABRIELLA NormanM  "

## 2021-09-01 NOTE — PROVIDER NOTIFICATION
09/01/21 1730   Provider Notification   Provider Name/Title GARETT Long CNM   Method of Notification In Department   Notification Reason Status Update   Ok for Cassandra to eat. No further decels for >5hours.

## 2021-09-01 NOTE — PROGRESS NOTES
"Labor progress note    S:  RN notified CNM of 3rd prolonged decel  Pt is not feeilng any contractions  Denies LOF or blood show     O:  Blood pressure 131/80, temperature 98.6  F (37  C), temperature source Oral, resp. rate 16, height 1.575 m (5' 2\"), weight 77.2 kg (170 lb 1.6 oz), last menstrual period 2020, not currently breastfeeding.  General appearance: comfortable.  Contractions: rare contraction . 30 seconds duration.  Palpate: not felt by patient.  FHT: Baseline 135 with mod  variability. Accelerations are  present. 1 prolonged decel to 50-60 bpm x 3  Min returned to baseline with no other  decelerations present.  ROM: not ruptured.I  Pitocin- none,  Antibiotics- none    A:  G1 30 yo IUP @ 37w0d IOL preeclampsia without severe features FGR fetal VSD    Fetal Heart rate tracing Category  One   Resumed folloing random prolonged decel   GBS- positive  Patient Active Problem List   Diagnosis     Supervision of high risk pregnancy in third trimester     Fetal cardiac anomaly affecting pregnancy, antepartum     Encounter for triage in pregnant patient     Elevated blood pressure affecting pregnancy in third trimester, antepartum     Labor and delivery, indication for care         P:  cervical ripening with misoprostol  MD consultant on call Dr Pina notified of 3rd prolonged decel cat 1 prior to and following decel/ available prn   Reviewed concern for fetal tolerating labor contractions  With sporadic mild contractions since admission fetus has had 3 decels options if recurrent decels would recommend  at any time. If no further decels and cat 1 tracing  Consider either rock baloon placement at 1500 vs IV pitocin for contraction stress test.    Couple is open to anything  Closely monitoring fetus low threshold for concerns open to  if recommended   At this time will continue to monitor closely and reevaluate at 4 hours post miso placement     GABRIELLA Norman CNM  "

## 2021-09-01 NOTE — PLAN OF CARE
Data: Patient presented to [facility] at 0808.   Reason for maternal/fetal assessment per patient is Induction Of Labor  .  Patient is a . Prenatal record reviewed.      OB History    Para Term  AB Living   1 0 0 0 0 0   SAB TAB Ectopic Multiple Live Births   0 0 0 0 0      # Outcome Date GA Lbr Nato/2nd Weight Sex Delivery Anes PTL Lv   1 Current            . Medical history:   Past Medical History:   Diagnosis Date     IUD (intrauterine device) in place 2016-2019    vikash     Vulvovaginitis    . Gestational Age 37w0d. B/Ps mildly hypertensive. Fetal movement present. Patient denies cramping, backache, pelvic pressure, UTI symptoms, GI problems, bloody show, vaginal bleeding, edema, headache, visual disturbances, epigastric or URQ pain, abdominal pain. Support persons Ty present.  Action: Verbal consent for EFM. Triage assessment completed. EFM applied for fetal assessment w/pre-E and IOL. Uterine assessment done see flow. Fetal assessment: Presumed adequate fetal oxygenation documented (see flow record).   Response: Jazmine RICEM informed of arrival. Plan per provider is IOL with angy. Patient verbalized agreement with plan. Patient directly to room 479 ambulatory, oriented to room and call light.

## 2021-09-01 NOTE — PROVIDER NOTIFICATION
09/01/21 1115   Provider Notification   Provider Name/Title GARETT Long CNM   Method of Notification In Department   Notification Reason Decels   Tracing suspect for deceleration- difficult to determine prolonged vs. Variable as fetal heart rate tracing was not connected but the tracing was connected where the FHTs were likely recovering back to BL. Cassandra also reports hearing an audible decrease in the FHTs. FHTs reactive with moderate variability surrounding decel. Genaro SHINE updated  on situation. Plan for rock placement around 1445 vs placing second v.miso.

## 2021-09-01 NOTE — PROVIDER NOTIFICATION
09/01/21 1050   Provider Notification   Provider Name/Title GARETT Long CNM   Method of Notification In Department   Notification Reason Status Update   NO further decelerations. FHTs reactive. Occasional contraction. 1st v.miso placed at 1046 by RN.

## 2021-09-01 NOTE — H&P
"ADMIT NOTE  =================  37w0d    Cassandra Langley is a 31 year old female with an Patient's last menstrual period was 2020. and Estimated Date of Delivery: Sep 22, 2021 [facility] at 9:10 AM for induction of labor.  Indication:37 wks  preeclampsia without severe features.   Fetal indications:  VSD  FGR   HPI  ================    Denies fever, cough, SOB or chest pain. Denies having contact with anyone who is Covid-19 positive. Agreeable to Covid-19 testing done  NEGATIVE   Contractions- none  Fetal movement- active  ROM- no.  Vaginal bleeding- none  GBS- positive- not treated until active labor  FOB- is involved, Ty    Weight gain- 166 - 143 lbs, Total weight gain- 23 lbs  Height- 5'2\"  BMI- 26  First prenatal visit at 10 weeks, Total visits- 15  PLAN per MFM  #Delivery planning:  - In the setting of severe FGR and GHTN, delivery is recommended at 37 weeks. Could be indicated sooner if  testing is non-reassuring or patient were to develop preeclampsia w/o SF.  Desires midwifery care for intrapartum management. IOL scheduled for 21 at 0800.   - Reviewed typical timeline and process for induction. Discussed cervical ripening methods, use of pitocin, etc.   - NICU for delivery. Reviewed increased risk for NICU admission given CHD and severe FGR.   - Hopes to avoid pain medication while in labor, but aware of pharmacologic pain management options.  - Feeding: breastfeeding  - Contraception: either NuvaRing or IUD, plans to discuss with her primary OB after delivery.      PROBLEM LIST  =================  Patient Active Problem List    Diagnosis Date Noted     Encounter for triage in pregnant patient 2021     Priority: Medium     Elevated blood pressure affecting pregnancy in third trimester, antepartum 2021     Priority: Medium     Fetal cardiac anomaly affecting pregnancy, antepartum 2021     Priority: Medium     Supervision of " high risk pregnancy in third trimester 02/17/2021     Priority: Medium     [facility] pt  Partner's name: Ty  [ ] NOB folder  [ ] Dating  [x] First tri screen; Low-risk NIPT  [x] QS/AFP; Low-risk MSAFP  [x] Fetal anatomy US: Mild anterior malaignment ventricular septal defect with normal flow across the pulmonary valve. Good size branch pulmonary artery. Normal left and Right ventricular size and systolic function.   [x] Rubella immune  [x] Hep B: nonreactive  [ ] Pap  [ ] Started ASA   [x] NO plan utox in labor   _____________________________________  [ ] EOB folder  [ ] PP Contraception plan: If tubal,consent date:  [x] Labor plans: Hopes to avoid epidural, but has flexible birth plan  [ ] :  [x] Infant feeding plan: breastfeeding  [x] FLU shot: 2/25/21  [x] TDAP: 7/1/21  [x] Covid vaccine (J&J): 4/2/21  [ ] Rhogam if needed, date:  [ ] TOLAC consent done  [ ] Waterbirth declines, consent done  [x] GCT, passed  ________________________________________  [ ] OTC PP meds sent  [ ] Planning CS-ERAS pkt           HISTORIES  ============  Allergies   Allergen Reactions     Keflex [Cephalosporins] Hives     Past Medical History:   Diagnosis Date     IUD (intrauterine device) in place 8/23/2016-11/2019    vikash     Vulvovaginitis      Past Surgical History:   Procedure Laterality Date     NO HISTORY OF SURGERY     .  Family History   Problem Relation Age of Onset     Thyroid Disease Mother 59        Thyroid removed      Hyperlipidemia Father      No Known Problems Maternal Grandmother      No Known Problems Maternal Grandfather      No Known Problems Paternal Grandmother      No Known Problems Paternal Grandfather      No Known Problems Sister      No Known Problems Brother      No Known Problems Other      Social History     Tobacco Use     Smoking status: Never Smoker     Smokeless tobacco: Never Used   Substance Use Topics     Alcohol use: Not Currently     Alcohol/week: 0.0 standard drinks     Comment: Socially,  "maybe once a month     OB History    Para Term  AB Living   1 0 0 0 0 0   SAB TAB Ectopic Multiple Live Births   0 0 0 0 0      # Outcome Date GA Lbr Nato/2nd Weight Sex Delivery Anes PTL Lv   1 Current                 LABS:   ===========  Prenatal Labs:  Rhogam not indicated   Lab Results   Component Value Date    ABO O 2021    RH Pos 2021    AS Negative 2021    RUQIGG 26 2021    HEPBANG Nonreactive 2021    HGB 13.5 2021    HIAGAB Nonreactive 2021    GLU1 136 (H) 2021     Rubella immune  : GBS POSITIVE   Other labs:  COVID-19 PCR Results    COVID-19 PCR Results 21   SARS CoV2 PCR Negative Negative      Comments are available for some flowsheets but are not being displayed.         COVID-19 Antibody Results, Testing for Immunity    COVID-19 Antibody Results, Testing for Immunity   No data to display.            No results found for this or any previous visit (from the past 24 hour(s)).    ROS  =========  Pt denies significant respiratory, cardiovacular, GI, or muscular/skeletalcomplaints.    See RN data base ROS.       PHYSICAL EXAM:  ===============  BP (!) 135/92   Temp 99.2  F (37.3  C) (Oral)   Resp 18   Ht 1.575 m (5' 2\")   Wt 77.2 kg (170 lb 1.6 oz)   LMP 2020   BMI 31.11 kg/m    General appearance: comfortable  GENERAL APPEARANCE: healthy, alert and no distress  RESP: lungs clear to auscultation - no rales, rhonchi or wheezes  CV: regular rates and rhythm, normal S1 S2, no S3 or S4 and no murmur,and no varicosities  ABDOMEN:  soft, nontender, no epigastric pain  SKIN: no suspicious lesions or rashes  NEURO: Denies headache, blurred vision, other vision changes  PSYCH: mentation appears normal. and affect normal/bright  Legs: Reflexes normal bilaterally     Abdomen: gravid, vertex fetus per Leopold's, non-tender between contractions.   Cephalic presentation confirmed by BSUS  EFW-  5 1/2  lbs.   CONTRACTIONS: none  FETAL HEART " TONES: continuous EFM- baseline 140 with moderate variability and positive accelerations. No decelerations.  PELVIC EXAM: 1/ 60%/ Posterior/ average/ -1   GARCIA SCORE: 6  BLOODY SHOW: no   ROM:uncertain- pending RomPlus  FLUID: clear  ROMPLUS: pending    # Pain Assessment:  Current Pain Score 9/1/2021   Patient currently in pain? denies   - Cassandra is  NOTexperiencing pain due to . Pain management was discussed and the plan was created in a collaborative fashion.  Cassandra's response to the current recommendations: engaged    ASSESSMENT:  ==============  32 yo G1 IUP @ 37w0d admitted for induction of labor.  Indication: preeclampsia without severe features  Fetal indications FGR, VSD   NST REACTIVE  Fetal Heart Tones - category one  GBS- positive- not treated until active labor  Covid- NEGATIVE     Patient Active Problem List   Diagnosis     Supervision of high risk pregnancy in third trimester     Fetal cardiac anomaly affecting pregnancy, antepartum     Encounter for triage in pregnant patient     Elevated blood pressure affecting pregnancy in third trimester, antepartum       PLAN:  ===========  Admit - see IP orders  cervical ripening with vaginal Misoprostol risks and benefits reviewed with pt. Agreeable to plan.  MD consultant on call Dr Pina / available prn  Ambulation, hydration, position changes, birthing ball and tub options to facilitate labor reviewed with pt .  GABRIELLA Norman CNM

## 2021-09-01 NOTE — PROVIDER NOTIFICATION
09/01/21 0955   Provider Notification   Provider Name/Title GARETT Long CNM   Method of Notification Electronic Page   Request Evaluate - Remote   Notification Reason Decels   Prolonged deceleration x1 which resolved spontaneously. Previous to that reactive. Had Cassandra reposition to right side

## 2021-09-01 NOTE — PROGRESS NOTES
Labs reviewed   AST   Date Value Ref Range Status   09/01/2021 33 0 - 45 U/L Final     Comment:     Specimen is hemolyzed which can falsely elevate AST. Analysis of a non-hemolyzed specimen may result in a lower value.   02/26/2020 19 0 - 45 U/L Final     Lab Results   Component Value Date    ALT 20 09/01/2021    ALT 35 02/26/2020     Lab Results   Component Value Date    WBC 9.5 09/01/2021    WBC 8.5 07/01/2021     Lab Results   Component Value Date    RBC 4.94 09/01/2021    RBC 4.42 07/01/2021     Lab Results   Component Value Date    HGB 13.6 09/01/2021    HGB 11.9 07/01/2021     Lab Results   Component Value Date    HCT 40.9 09/01/2021    HCT 37.8 07/01/2021     No components found for: MCT  Lab Results   Component Value Date    MCV 83 09/01/2021    MCV 86 07/01/2021     Lab Results   Component Value Date    MCH 27.5 09/01/2021    MCH 26.9 07/01/2021     Lab Results   Component Value Date    MCHC 33.3 09/01/2021    MCHC 31.5 07/01/2021     Lab Results   Component Value Date    RDW 14.7 09/01/2021    RDW 13.6 07/01/2021     Lab Results   Component Value Date     09/01/2021     07/01/2021     Last Comprehensive Metabolic Panel:  Sodium   Date Value Ref Range Status   09/01/2021 135 133 - 144 mmol/L Final   02/26/2020 135 133 - 144 mmol/L Final     Potassium   Date Value Ref Range Status   09/01/2021 4.7 3.4 - 5.3 mmol/L Final     Comment:     Specimen slightly hemolyzed, potassium may be falsely elevated.   02/26/2020 4.2 3.4 - 5.3 mmol/L Final     Chloride   Date Value Ref Range Status   09/01/2021 107 94 - 109 mmol/L Final   02/26/2020 105 94 - 109 mmol/L Final     Carbon Dioxide   Date Value Ref Range Status   02/26/2020 25 20 - 32 mmol/L Final     Carbon Dioxide (CO2)   Date Value Ref Range Status   09/01/2021 24 20 - 32 mmol/L Final     Anion Gap   Date Value Ref Range Status   09/01/2021 4 3 - 14 mmol/L Final   02/26/2020 5 3 - 14 mmol/L Final     Glucose   Date Value Ref Range Status  "  09/01/2021 95 70 - 99 mg/dL Final   02/26/2020 95 70 - 99 mg/dL Final     Urea Nitrogen   Date Value Ref Range Status   09/01/2021 9 7 - 30 mg/dL Final   02/26/2020 10 7 - 30 mg/dL Final     Creatinine   Date Value Ref Range Status   09/01/2021 0.50 (L) 0.52 - 1.04 mg/dL Final   02/26/2020 0.84 0.52 - 1.04 mg/dL Final     GFR Estimate   Date Value Ref Range Status   09/01/2021 >90 >60 mL/min/1.73m2 Final     Comment:     As of July 11, 2021, eGFR is calculated by the CKD-EPI creatinine equation, without race adjustment. eGFR can be influenced by muscle mass, exercise, and diet. The reported eGFR is an estimation only and is only applicable if the renal function is stable.   02/26/2020 >90 >60 mL/min/[1.73_m2] Final     Comment:     Non  GFR Calc  Starting 12/18/2018, serum creatinine based estimated GFR (eGFR) will be   calculated using the Chronic Kidney Disease Epidemiology Collaboration   (CKD-EPI) equation.       Calcium   Date Value Ref Range Status   09/01/2021 9.2 8.5 - 10.1 mg/dL Final   02/26/2020 9.6 8.5 - 10.1 mg/dL Final     Creatinine   Date Value Ref Range Status   09/01/2021 0.50 (L) 0.52 - 1.04 mg/dL Final   02/26/2020 0.84 0.52 - 1.04 mg/dL Final     Patient Vitals for the past 24 hrs:   BP Temp Temp src Resp Height Weight   09/01/21 1050 131/80 98.6  F (37  C) Oral 16 -- --   09/01/21 0855 -- -- -- -- 1.575 m (5' 2\") --   09/01/21 0845 (!) 135/92 99.2  F (37.3  C) Oral 18 -- --   09/01/21 0822 -- -- -- -- -- 77.2 kg (170 lb 1.6 oz)    #1  Miso PV     "

## 2021-09-02 LAB — HGB BLD-MCNC: 11.9 G/DL (ref 11.7–15.7)

## 2021-09-02 PROCEDURE — 722N000001 HC LABOR CARE VAGINAL DELIVERY SINGLE

## 2021-09-02 PROCEDURE — 36415 COLL VENOUS BLD VENIPUNCTURE: CPT | Performed by: MIDWIFE

## 2021-09-02 PROCEDURE — 120N000002 HC R&B MED SURG/OB UMMC

## 2021-09-02 PROCEDURE — 85018 HEMOGLOBIN: CPT | Performed by: MIDWIFE

## 2021-09-02 PROCEDURE — 88307 TISSUE EXAM BY PATHOLOGIST: CPT | Mod: TC | Performed by: MIDWIFE

## 2021-09-02 PROCEDURE — 88307 TISSUE EXAM BY PATHOLOGIST: CPT | Mod: 26 | Performed by: PATHOLOGY

## 2021-09-02 PROCEDURE — 59409 OBSTETRICAL CARE: CPT | Performed by: MIDWIFE

## 2021-09-02 PROCEDURE — 250N000013 HC RX MED GY IP 250 OP 250 PS 637: Performed by: MIDWIFE

## 2021-09-02 RX ORDER — METHYLERGONOVINE MALEATE 0.2 MG/ML
200 INJECTION INTRAVENOUS
Status: DISCONTINUED | OUTPATIENT
Start: 2021-09-02 | End: 2021-09-03 | Stop reason: HOSPADM

## 2021-09-02 RX ORDER — MISOPROSTOL 200 UG/1
400 TABLET ORAL
Status: DISCONTINUED | OUTPATIENT
Start: 2021-09-02 | End: 2021-09-03 | Stop reason: HOSPADM

## 2021-09-02 RX ORDER — ACETAMINOPHEN 325 MG/1
650 TABLET ORAL EVERY 4 HOURS PRN
COMMUNITY
Start: 2021-09-02

## 2021-09-02 RX ORDER — OXYTOCIN 10 [USP'U]/ML
10 INJECTION, SOLUTION INTRAMUSCULAR; INTRAVENOUS
Status: DISCONTINUED | OUTPATIENT
Start: 2021-09-02 | End: 2021-09-03 | Stop reason: HOSPADM

## 2021-09-02 RX ORDER — OXYTOCIN/0.9 % SODIUM CHLORIDE 30/500 ML
340 PLASTIC BAG, INJECTION (ML) INTRAVENOUS CONTINUOUS PRN
Status: DISCONTINUED | OUTPATIENT
Start: 2021-09-02 | End: 2021-09-03 | Stop reason: HOSPADM

## 2021-09-02 RX ORDER — BISACODYL 10 MG
10 SUPPOSITORY, RECTAL RECTAL DAILY PRN
Status: DISCONTINUED | OUTPATIENT
Start: 2021-09-02 | End: 2021-09-03 | Stop reason: HOSPADM

## 2021-09-02 RX ORDER — DOCUSATE SODIUM 100 MG/1
100 CAPSULE, LIQUID FILLED ORAL DAILY
COMMUNITY
Start: 2021-09-03 | End: 2022-12-06

## 2021-09-02 RX ORDER — DOCUSATE SODIUM 100 MG/1
100 CAPSULE, LIQUID FILLED ORAL DAILY
Status: DISCONTINUED | OUTPATIENT
Start: 2021-09-02 | End: 2021-09-03 | Stop reason: HOSPADM

## 2021-09-02 RX ORDER — CARBOPROST TROMETHAMINE 250 UG/ML
250 INJECTION, SOLUTION INTRAMUSCULAR
Status: DISCONTINUED | OUTPATIENT
Start: 2021-09-02 | End: 2021-09-03 | Stop reason: HOSPADM

## 2021-09-02 RX ORDER — ACETAMINOPHEN 325 MG/1
650 TABLET ORAL EVERY 4 HOURS PRN
Status: DISCONTINUED | OUTPATIENT
Start: 2021-09-02 | End: 2021-09-03 | Stop reason: HOSPADM

## 2021-09-02 RX ORDER — MISOPROSTOL 200 UG/1
800 TABLET ORAL
Status: DISCONTINUED | OUTPATIENT
Start: 2021-09-02 | End: 2021-09-03 | Stop reason: HOSPADM

## 2021-09-02 RX ORDER — TRANEXAMIC ACID 10 MG/ML
1 INJECTION, SOLUTION INTRAVENOUS EVERY 30 MIN PRN
Status: DISCONTINUED | OUTPATIENT
Start: 2021-09-02 | End: 2021-09-03 | Stop reason: HOSPADM

## 2021-09-02 RX ORDER — HYDROCORTISONE 2.5 %
CREAM (GRAM) TOPICAL 3 TIMES DAILY PRN
Status: DISCONTINUED | OUTPATIENT
Start: 2021-09-02 | End: 2021-09-03 | Stop reason: HOSPADM

## 2021-09-02 RX ORDER — IBUPROFEN 800 MG/1
800 TABLET, FILM COATED ORAL EVERY 6 HOURS PRN
COMMUNITY
Start: 2021-09-02 | End: 2022-12-06

## 2021-09-02 RX ORDER — IBUPROFEN 800 MG/1
800 TABLET, FILM COATED ORAL EVERY 6 HOURS PRN
Status: DISCONTINUED | OUTPATIENT
Start: 2021-09-02 | End: 2021-09-03 | Stop reason: HOSPADM

## 2021-09-02 RX ORDER — MODIFIED LANOLIN
OINTMENT (GRAM) TOPICAL
Status: DISCONTINUED | OUTPATIENT
Start: 2021-09-02 | End: 2021-09-03 | Stop reason: HOSPADM

## 2021-09-02 RX ADMIN — IBUPROFEN 800 MG: 800 TABLET ORAL at 06:16

## 2021-09-02 RX ADMIN — IBUPROFEN 800 MG: 800 TABLET ORAL at 18:49

## 2021-09-02 RX ADMIN — IBUPROFEN 800 MG: 800 TABLET ORAL at 12:34

## 2021-09-02 RX ADMIN — ACETAMINOPHEN 650 MG: 325 TABLET, FILM COATED ORAL at 23:27

## 2021-09-02 RX ADMIN — ACETAMINOPHEN 650 MG: 325 TABLET, FILM COATED ORAL at 18:50

## 2021-09-02 RX ADMIN — ACETAMINOPHEN 650 MG: 325 TABLET, FILM COATED ORAL at 02:18

## 2021-09-02 RX ADMIN — IBUPROFEN 800 MG: 800 TABLET ORAL at 00:44

## 2021-09-02 RX ADMIN — ACETAMINOPHEN 650 MG: 325 TABLET, FILM COATED ORAL at 14:43

## 2021-09-02 RX ADMIN — DOCUSATE SODIUM 100 MG: 100 CAPSULE, LIQUID FILLED ORAL at 06:16

## 2021-09-02 RX ADMIN — ACETAMINOPHEN 650 MG: 325 TABLET, FILM COATED ORAL at 06:15

## 2021-09-02 RX ADMIN — ACETAMINOPHEN 650 MG: 325 TABLET, FILM COATED ORAL at 10:31

## 2021-09-02 NOTE — PLAN OF CARE
Patient stable and VSS. Patient laboring and reported feeling leaking around . Carter bulb came out with some bloody show and appeared to be to be leaking clear, pink fluid. Patient uncomfortable with contractions and using nitrous, birthing ball, and hot packs. Significant other Ty at bedside for labor support. At 2215 patient reported feeling pressure and SVE a lip.  at 2246 baby boy. Fundus firm, and at umbilicus. Minimal bleeding and  ml. Able to urinate post delivery. Using prn medication and ice packs for sore perineum. Will continue with plan of care

## 2021-09-02 NOTE — L&D DELIVERY NOTE
Delivery Summary    Cassandra Langley MRN# [id number]   Age: 31 year old YOB: 1990     ASSESSMENT & PLAN: Delivery Note    Labor Course: Cassandra Langley is a 31 year old   at  37w0d presented to labor floor for induction for preeclampsia without severe features. Her pregnancy was also complicated by FGR in the 5th percentile and fetal VSD.  She received one dose of vaginal miso and a 70cc Carter balloon was placed at 1515. At  SROM occurred showing clear fluid, Carter balloon pulled out, and active labor began. Clindamycin was started for GBS prophylaxis. She used nitrous oxide and comfort measures including movement on birth ball and massage for pain management.     Delivery Course:  Quickly progressed to complete at 2231 and started pushing at 2232. Pushed effectively with CNM coaching and brought fetus to crown within several contractions. NICU was called to attend delivery. FHR with prolonged decel for 5 minutes while . Moderate variability throughout. Head delivered MOA and restituted to BALBIR . No nuchal cord. Shoulders easily delivered under maternal effort. Baby boy was born at 2346 and immediately placed on mom's abdomen. Spontaneous breath, vigorous cry, well flexed, Hr>100. IV pitocin started after delivery of infant. Delayed cord clamping for 1.5 minutes then clamped x2 and cut by Ty. Baby was briefly brought to warmer for NICU assessment and then returned skin to skin. Cord blood obtained for typing and genetic testing. Placenta spontaneously delivered intact without difficulty via Schultze mechanism. Inspection of vagina and perineum revealed a 1st degree laceration and bilateral labial lacerations. Perineal laceration was repaired in the usual fashion with 3.0 vicryl. Several interrupted sutures with 4.0 used to close labial lacerations bilaterally. 1% lidocaine was infiltrated before the repair. Hemostasis noted. Fundus is firm and midline.  Mom and baby are  stable.      IUP at 37 weeks gestation delivered on 2021.     delivery of a viable Male infant.  Weight : 4 pounds 15 ounces   Apgars of 8 at 1 minute and 9 at 5 minutes.  Labor was induced.  Medications administered  in labor:  Pain Rx nitrous oxide; Antibiotics Yes: clindamycin started  Perineum: 1st degree and Labial laceration  Placenta-mechanism: spontaneous, intact,  with a 3 vessel cord. IV oxytocin was given.  QBL was 402.  Anticipated Discharge Date: 9/3/21  Complications of pregnancy, labor and delivery: Low Birth Weight (<2500 gms), preeclampsia, fetal VSD, GBS positive and not adequately treated  Birth attendants: GABRIELLA Maradiaga, Cookie Guaman [[id number]]    Labor Event Times    Labor onset date: 21 Onset time:  8:00 PM   Dilation complete date: 21 Complete time: 10:31 PM   Start pushing date/time: 2021 2232      Labor Events     labor?: No   steroids: Full Course  Labor Type: Induction/Cervical ripening  Predominate monitoring during 1st stage: continuous electronic fetal monitoring     Antibiotics received during labor?: Yes  Reason for Antibiotics: GBS  Antibiotics received for GBS: Clindamycin  Antibiotics Given (GBS): Less than or equal to 4 hours prior to delivery     Rupture date/time: 21   Rupture type: Spontaneous rupture of membranes occuring during spontaneous labor or augmentation  Fluid color: Clear, Pink  Fluid odor: Normal     Induction: Misoprostol, Mechanical ripening agent  Induction date/time: 21 0800   Cervical ripening date/time:     Indications for induction: Mild Preeclampsia     Augmentation: None   Labor partogram used?: no      Delivery/Placenta Date and Time    Delivery Date: 21 Delivery Time: 10:46 PM   Placenta Date/Time: 2021 10:51 PM  Oxytocin given at the time of delivery: after delivery of baby  Delivering clinician: Ruth Etienne APRN CNM   Other personnel present at delivery:   Provider Role   Beth Ardon RN Registered Nurse   Michelle Boo RN Registered Nurse         Vaginal Counts     Initial count performed by 2 team members:  Two Team Members   RL Maradiaga RN       Needles Suture Needles Sponges (RETIRED) Instruments   Initial counts 2 0 5    Added to count       Relief counts       Final counts 2 0 5          Placed during labor Accounted for at the end of labor   FSE No NA   IUPC No NA   Cervadil No NA              Final count performed by 2 team members:  Two Team Members   RL Maradiaga RN      Final count correct?: Yes     Apgars     1 Minute 5 Minute 10 Minute 15 Minute 20 Minute   Skin color: 0  1       Heart rate: 2  2       Reflex irritability: 2  2       Muscle tone: 2  2       Respiratory effort: 2  2       Total: 8  9       Apgars assigned by: KENNY HAWKINS     Cord    Cord Complications: None               Stem cell collection?: No       [place] Resuscitation    Methods: None   Care at Delivery: Called by Ruth Etienne CNM secondary to known IUGR and small, anterior VSD noted on fetal echo. Infant delivered at 2246 on 21 and cried vigorously. Cord clamping delayed 1 minute per routine. Infant then taken to warm where he was dried and stimulated. Infant was pink and well perfused. He will be [facility] to [facility] for routine cares, but WILL REQUIRE CARDIOLOGY consult within 24 hours (ordered), and an echocardiogram prior to discharge.    SRIRAM Hawkins 2021 10:55 PM       [place] Measurements    Weight: 4 lb 15.4 oz       Skin to Skin and Feeding Plan    Skin to skin initiation date/time: 1841    Skin to skin with: Mother  Skin to skin end date/time:        Labor Events and Shoulder Dystocia    Fetal Tracing Prior to Delivery: Category 2  Fetal Tracing Comments: cat 1 with prolonged decel during  lasting 5 mins, violet to 70s    Shoulder dystocia present?: Neg     Delivery (Maternal)  (Provider to Complete) ([id number])    Episiotomy: None  Perineal lacerations: 1st Repaired?: Yes   Vaginal laceration?: Yes Repaired?: Yes   Repair suture: 3-0 Vicryl, 4-0 Vicryl  Genital tract inspection done: Pos     Blood Loss  Mother: Cassandra Langley #[id number]   Start of Mother's Information    Delivery Blood Loss  09/01/21 2000 - 09/01/21 2352    Delivery QBL (mL) Hospital Encounter 140 mL    Total  140 mL         End of Mother's Information  Mother: Cassandra Langley #[id number]          Delivery - Provider to Complete ([id number])    Delivering clinician: Ruth Etienne APRN CNM  CNM Care: Exclusive CNM care in labor  Attempted Delivery Types (Choose all that apply): Spontaneous Vaginal Delivery  Delivery Type (Choose the 1 that will go to the Birth History): Vaginal, Spontaneous                   Other personnel:  Provider Role   Beth Ardon RN Registered Nurse   Michelle Boo RN Registered Nurse                Placenta    Date/Time: 9/1/2021 10:51 PM  Removal: Spontaneous  Comments: via Schultze mechanism   Disposition: Pathology           Anesthesia    Method: Nitrous Oxide                Presentation and Position    Presentation: Vertex    Position: Right Occiput Anterior                 GABRIELLA Maradiaga CNM

## 2021-09-02 NOTE — PROGRESS NOTES
"Blood pressure (!) 143/75, temperature 98.5  F (36.9  C), temperature source Oral, resp. rate 18, height 1.575 m (5' 2\"), weight 77.2 kg (170 lb 1.6 oz), last menstrual period 12/16/2020, not currently breastfeeding.  Patient Vitals for the past 24 hrs:   BP Temp Temp src Resp Height Weight   09/01/21 2101 (!) 143/75 98.5  F (36.9  C) Oral 18 -- --   09/01/21 1850 (!) 156/86 98.6  F (37  C) Oral 18 -- --   09/01/21 1505 138/79 99  F (37.2  C) Oral 16 -- --   09/01/21 1050 131/80 98.6  F (37  C) Oral 16 -- --   09/01/21 0855 -- -- -- -- 1.575 m (5' 2\") --   09/01/21 0845 (!) 135/92 99.2  F (37.3  C) Oral 18 -- --   09/01/21 0822 -- -- -- -- -- 77.2 kg (170 lb 1.6 oz)     General appearance: increasingly uncomfortable with contractions since SROM at 2000. Was on birth ball and now side-lying in bed. Breathing and working hard through each contractions. Ty providing hip squeezes and emotional support. Using N2O.     CONTRACTIONS: every 2-4 minutes  Antibiotics- Clindamycin started at 2021  FETAL HEART TONES: continuous EFM- baseline 130 with moderate variability and positive accelerations. No decelerations.  ROM: clear fluid at 2000  PELVIC EXAM: 9.5cm with right lip, +1 station  PAIN: using nitrous     ASSESSMENT:  ==============  IUP @ 37w0d for induction of labor.  Indication: preeclampsia without severe features      FGR in 5th %, 1966g on 8/16/21     Fetal Heart Rate Tracing category one      Fetal VSD     GBS- positive- clindamycin started, not adequately treated     Patient Active Problem List   Diagnosis     Supervision of high risk pregnancy in third trimester     Fetal cardiac anomaly affecting pregnancy, antepartum     Encounter for triage in pregnant patient     Elevated blood pressure affecting pregnancy in third trimester, antepartum     Labor and delivery, indication for care     PLAN:  ===========  Pain medication- continue using N2O as desired   Begin pushing with urge   NICU at delivery  Additional " cord blood sample for genetic testing- purple and green top   cardiology consult after birth and echo before discharge  Anticipate      GABRIELLA MaradiagaM

## 2021-09-02 NOTE — PROGRESS NOTES
"Blood pressure (!) 156/86, temperature 98.6  F (37  C), temperature source Oral, resp. rate 18, height 1.575 m (5' 2\"), weight 77.2 kg (170 lb 1.6 oz), last menstrual period 12/16/2020, not currently breastfeeding.  Patient Vitals for the past 24 hrs:   BP Temp Temp src Resp Height Weight   09/01/21 1850 (!) 156/86 98.6  F (37  C) Oral 18 -- --   09/01/21 1505 138/79 99  F (37.2  C) Oral 16 -- --   09/01/21 1050 131/80 98.6  F (37  C) Oral 16 -- --   09/01/21 0855 -- -- -- -- 1.575 m (5' 2\") --   09/01/21 0845 (!) 135/92 99.2  F (37.3  C) Oral 18 -- --   09/01/21 0822 -- -- -- -- -- 77.2 kg (170 lb 1.6 oz)     General appearance: uncomfortable cramping with contractions. Wants to try to rest/sleep in between.   Denies headache, vision changes, RUQ/pigastric pain.     Pitocin- none,  Antibiotics- plan for clinda   CONTRACTIONS: every 1.5-4 minutes  FETAL HEART TONES: continuous EFM- baseline 130 with moderate variability and positive accelerations. No decelerations.   ROM: not ruptured. Pt felt trickle this morning, ROMPlus neg. Felt trickle again in bed. Towel between legs for 30 mins, remains dry.   PELVIC EXAM: deferred. Carter balloon     # Pain Assessment:  Current Pain Score 9/1/2021   Patient currently in pain? yes   - Cassandra is experiencing pain due to contractions. Pain management was discussed and the plan was created in a collaborative fashion.  Cassandra's response to the current recommendations: engaged  - pt reports she hopes to avoid an epidural  - reviewed nonpharmalogic comfort measures  - reviewed N2O and IV narcotic options     ASSESSMENT:  ==============  IUP @ 37w0d for induction of labor.  Indication: preeclampsia without severe features     FGR in 5th %, 1966g on 8/16/21    Fetal Heart Rate Tracing category one     Fetal VSD    GBS- positive- not treated until active labor    Patient Active Problem List   Diagnosis     Supervision of high risk pregnancy in third trimester     Fetal cardiac " anomaly affecting pregnancy, antepartum     Encounter for triage in pregnant patient     Elevated blood pressure affecting pregnancy in third trimester, antepartum     Labor and delivery, indication for care     PLAN:  ===========  Ambulation, hydration, position changes, birthing ball/sling and tub options to facilitate labor.  Pain medication options of Nitrous Oxide, Fentanyl IV and epidural reviewed with pt.   Continue cervical ripening with cervical rock bulb. Discussed starting Pitocin if contractions space.   Pt aware to report any headache, vision changes, RUQ/pigastric pain.   Prophylactic IV antibiotic for positive GBS status reviewed with pt. Agreeable to Clindamycin with ROM or active labor.   NICU at delivery  Additional cord blood sample for genetic testing- purple and green top   cardiology consult after birth and echo before discharge  Anticipate     GABRIELLA Maradiaga CNM    ADDENDUM at :   S: Pt reported bigger gush of fluid at . Pt feels contractions are stronger in past 30 mins. Now using N2O.    O:  SROM at , clear fluid. Towel with large wet area of clear fluid and some bloody show. Tension applied to Rock balloon which easily pulled out at .  Pelvic exam deferred. Continuous EFM- baseline 130 with moderate variability and positive accelerations. No decelerations.     A: IOL for preE without severe features, FGR, fetal VSD. Cat 1 FHT.     P: Start clindamycin now for GBS prophylaxis. Expectant management with plans to start Pitocin if contractions space. Ambulation, hydration, position changes, birthing ball/sling and tub options to facilitate labor and for comfort.     GABRIELLA Maradiaga CNM

## 2021-09-02 NOTE — PLAN OF CARE
Patient arrived to [facility] unit via wheelchair with belongings, accompanied by , with infant in arms. Got report from ANNIA Campos and checked bands. Unit and room oriented complete. No concerns at this time. Continue with plan of care.

## 2021-09-02 NOTE — PLAN OF CARE
VSS. B/Ps remain mildly hypertensive. Denies preE symptoms. Urine out put adequate. Decelerations have resolved. FHTs reactive. Carter balloon in place. Uterine contractions every 2-4 minutes and becoming more intense. Anticipate .

## 2021-09-02 NOTE — PLAN OF CARE
Data: Vital signs within normal limits, except /92. Postpartum checks within normal limits - see flow record. Patient eating and drinking normally. Patient able to empty bladder independently and is up ambulating. No apparent signs of infection. Patient performing self cares and is able to care for infant.  Action: Patient medicated during the shift for pain. See MAR. Patient reassessed within 1 hour after each medication and pain was improved - patient stated she was comfortable. Patient education done about safe sleep and room orientation. See flow record.  Response: Positive attachment behaviors observed with infant. Support person, , Ty, present.

## 2021-09-02 NOTE — PLAN OF CARE
infant boy at 2246. Placed immediately skin to skin. Discussed importance of keeping baby dry, skin to skin, hat on and warm blankets. Infant is SGA. Discussed hand expression and importance of feeding. Will continue to monitor and continue with plan of care

## 2021-09-02 NOTE — PLAN OF CARE
Data: Cassandra Langley transferred to [id number] via wheelchair at 0126. Baby transferred via parent's arms.  Action: Receiving unit notified of transfer: Yes. Patient and family notified of room change. Report given to Shital SANCHEZ RN at 0126. Belongings sent to receiving unit. Accompanied by Registered Nurse. Oriented patient to surroundings. Call light within reach. ID bands double-checked with receiving RN.  Response: Patient tolerated transfer and is stable.

## 2021-09-02 NOTE — PROVIDER NOTIFICATION
Patient felt small amount of gush. Removed towel and scant amount of clear, red fluid on towel. Appeared to be ruptured. Notified RL Etienne. Will start antibiotics.

## 2021-09-02 NOTE — PROGRESS NOTES
Cassandra Langley      MRN#: [id number]  Age: 31 year old      YOB: 1990      PPD #1 S/P   Patient feels well and is without issue, baby is rooming in  Breast feeding status:initiated  Complications since 2 hours post delivery: None  Patient is tolerating regular diet,  tolerating acitivity well, voiding without difficulty, cramping is minimal and is relieved by Ibuprofen and tylenol, lochia is decreasing, no clots.  Perineal pain is is minimal and is relieved by Ibuprofen and tylenol.  The perineum laceration is well approximated and mild edema present.   no BM, but passing gas and taking stool softener.     SIGNIFICANT PROBLEMS:  Patient Active Problem List    Diagnosis Date Noted     Labor and delivery, indication for care 2021     Priority: Medium     Encounter for triage in pregnant patient 2021     Priority: Medium     Elevated blood pressure affecting pregnancy in third trimester, antepartum 2021     Priority: Medium     Fetal cardiac anomaly affecting pregnancy, antepartum 2021     Priority: Medium     Supervision of high risk pregnancy in third trimester 2021     Priority: Medium     [facility] CNM  MD pt  Partner's name: Ty  [ ] NOB folder  [ ] Dating  [x] First tri screen; Low-risk NIPT  [x] QS/AFP; Low-risk MSAFP  [x] Fetal anatomy US: Mild anterior malaignment ventricular septal defect with normal flow across the pulmonary valve. Good size branch pulmonary artery. Normal left and Right ventricular size and systolic function.   [x] Rubella immune  [x] Hep B: nonreactive  [ ] Pap  [ ] Started ASA   [x] NO plan utox in labor   _____________________________________  [ ] EOB folder  [ ] PP Contraception plan: If tubal,consent date:  [x] Labor plans: Hopes to avoid epidural, but has flexible birth plan  [ ] :  [x] Infant feeding plan: breastfeeding  [x] FLU shot: 21  [x] TDAP: 21  [x] Covid vaccine (J&J): 21  [ ] Rhogam if needed,  date:  [ ] TOLAC consent done  [ ] Waterbirth declines, consent done  [x] GCT, passed  ________________________________________  [ ] OTC PP meds sent  [ ] Planning CS-ERAS pkt             PE:    Vitals:    09/02/21 0028 09/02/21 0043 09/02/21 0130 09/02/21 0609   BP: (!) 137/90 127/69 126/84 (!) 123/92   Pulse:   88 99   Resp: 16  16 16   Temp: 98.5  F (36.9  C)  99.4  F (37.4  C) 99.5  F (37.5  C)   TempSrc: Oral  Oral Oral   SpO2:    99%   Weight:       Height:           NAD  Breasts: Soft, filling  Nipples: Intact, Non-tender  Abdomen: Soft, Non-tender      Uterus: Fundus Firm, Non-tender, located at the umbilicus   Lochia: Rubra, appropriate amount    Perineum:  Well-approximated, healing well  Lower Extremities: trace Edema Bilateral, Negative Zion's Sign        Postpartum hemoglobin    Recent Labs   Lab 09/01/21  1007 08/30/21  1013   HGB 13.6 13.5     Blood type   Lab Results   Component Value Date    ABO O 02/25/2021       Lab Results   Component Value Date    RH Pos 02/25/2021     Rubella status - immune      Assessment/Plan-   Stable Post-partum day #1  Complications:PreE w/o SF. Bps normal- low mild range. Denies s/sx SF  Breastfeeding: needs lactation consult today  Rhophylac not indicated    Teaching done: D/C Instructions: Nutrition/Activity, Engorgement Management, Birth Control Options, Warning Signs/When to Call: Excessive Bleeding, Infection, PP Depression, Kegals and Crunches, RTC Clinic for PP Appointment and PNV    Postpartum warning s/s reviewed, including bleeding/clots, fever, mastitis, or depression    Birthcontrol planned:IUD Mirena, plan to insert at 6-8 wks postpartum OR Combined hormonal method--NuvaRing   Current Discharge Medication List      START taking these medications    Details   acetaminophen (TYLENOL) 325 MG tablet Take 2 tablets (650 mg) by mouth every 4 hours as needed for mild pain or fever (greater than or equal to 38  C /100.4  F (oral) or 38.5  C/ 101.4  F (core).)     Associated Diagnoses:  (normal spontaneous vaginal delivery)      docusate sodium (COLACE) 100 MG capsule Take 1 capsule (100 mg) by mouth daily    Associated Diagnoses:  (normal spontaneous vaginal delivery)      ibuprofen (ADVIL/MOTRIN) 800 MG tablet Take 1 tablet (800 mg) by mouth every 6 hours as needed for other (cramping)    Associated Diagnoses:  (normal spontaneous vaginal delivery)         CONTINUE these medications which have NOT CHANGED    Details   Prenatal Vit-Fe Fum-FA-Omega (PRENATAL MULTI +DHA PO) Take 1 tablet by mouth daily             Routine Postpartum Management  Encouraged Postpartum videos before discharge  Anticipate discharge to home tomorrow  RTC 2 week telephone call mood check and 6 week postpartum visit     GABRIELLA ParisiM

## 2021-09-02 NOTE — LACTATION NOTE
This note was copied from a baby's chart.  Consult for: Early term delivery SGA infant not wanting to latch, first time breastfeeding.     History:  Vaginal delivery @ 37w0d, SGA infant @ 4# 15.4 oz. birthweight, 11 hours old at time of visit. Prenatally followed for fetal growth restriction and VSD, pending echo and cardiology consult post delivery. Parents share he  well after delivery (however latch score of 4) but sleepy since. No significant medical history for mom, had IOL for preeclampsia without severe features.     Breast exam of mom: Soft, symmetric with intact nipples that appear everted but retract with areolar compression bilaterally.      Oral exam of baby: Minimal length of tongue palpable beyond lingual frenulum insertion, sleepy and disorganized biting on finger at time of exam.    Feeding assessment:  Attempted feeding at breast both with and without nipple shield, Sarita was sleepy and disinterested. Once SNS offered via curved tip syringe he did a few sucks but not sustained, after 5-7 minutes went to finger feeding. Assisted dad with finger feed, tolerated well. Mom did hands on pumping getting drops on flanges.     Education provided: Discussed positioning with good support, anatomy of breast and infant mouth, tips to get and maintain deeper latch with and without nipple shield, breast compressions to help him get more while feeidng, benefits of feeding on cue (no longer than 2-3 hours between), frequent skin to skin and milk expression in early days to establish supply. Taught how to do breast massage, hand expression & hands on pumping, dad how to do finger feeding. Reviewed [facility] pump cleaning recommendations and demo washing pump parts and curved tip syringe.    Feeding Plan:  Frequent skin to skin, breastfeed on cue every 2-3 hours, breast compressions to enhance milk transfer & supplement after according to guidelines for baby under 6 pounds. Encourage continued hand expressing  after each feeding until milk is in and hands on pumping as often as she can, ideally each time Sarita gets donor milk.   Supplement Guidelines for infants <37 weeks gestation or < 6 lbs at birth per the [facility] for [facility]  Infant (35-42 weeks) Policy ([facility] Guidelines):  Infants <37 weeks OR <6 pounds   Birth-24 hours of age: 5ml (1 tsp) every 2-3 hours, at least 8 times in 24 hours   24-48 hours of age: 10 ml (2 tsp) every 2-3 hours, at least 8 times in 24 hours   48-72 hours of age: 15 ml (3 tsp) every 2-3 hours, at least 8 times in 24 hours

## 2021-09-03 VITALS
TEMPERATURE: 99.2 F | SYSTOLIC BLOOD PRESSURE: 128 MMHG | HEART RATE: 91 BPM | WEIGHT: 170.1 LBS | RESPIRATION RATE: 18 BRPM | DIASTOLIC BLOOD PRESSURE: 93 MMHG | OXYGEN SATURATION: 99 % | HEIGHT: 62 IN | BODY MASS INDEX: 31.3 KG/M2

## 2021-09-03 PROCEDURE — 250N000013 HC RX MED GY IP 250 OP 250 PS 637: Performed by: MIDWIFE

## 2021-09-03 RX ADMIN — IBUPROFEN 800 MG: 800 TABLET ORAL at 12:22

## 2021-09-03 RX ADMIN — DOCUSATE SODIUM 100 MG: 100 CAPSULE, LIQUID FILLED ORAL at 06:36

## 2021-09-03 RX ADMIN — ACETAMINOPHEN 650 MG: 325 TABLET, FILM COATED ORAL at 08:27

## 2021-09-03 RX ADMIN — IBUPROFEN 800 MG: 800 TABLET ORAL at 06:36

## 2021-09-03 NOTE — DISCHARGE SUMMARY
Post Partum Note and Discharge Summary  SIGNIFICANT PROBLEMS:  Patient Active Problem List    Diagnosis Date Noted     Labor and delivery, indication for care 2021     Priority: Medium     Encounter for triage in pregnant patient 2021     Priority: Medium     Elevated blood pressure affecting pregnancy in third trimester, antepartum 2021     Priority: Medium     Fetal cardiac anomaly affecting pregnancy, antepartum 2021     Priority: Medium     Supervision of high risk pregnancy in third trimester 2021     Priority: Medium     [facility] pt  Partner's name: Ty  [ ] NOB folder  [ ] Dating  [x] First tri screen; Low-risk NIPT  [x] QS/AFP; Low-risk MSAFP  [x] Fetal anatomy US: Mild anterior malaignment ventricular septal defect with normal flow across the pulmonary valve. Good size branch pulmonary artery. Normal left and Right ventricular size and systolic function.   [x] Rubella immune  [x] Hep B: nonreactive  [ ] Pap  [ ] Started ASA   [x] NO plan utox in labor   _____________________________________  [ ] EOB folder  [ ] PP Contraception plan: If tubal,consent date:  [x] Labor plans: Hopes to avoid epidural, but has flexible birth plan  [ ] :  [x] Infant feeding plan: breastfeeding  [x] FLU shot: 21  [x] TDAP: 21  [x] Covid vaccine (J&J): 21  [ ] Rhogam if needed, date:  [ ] TOLAC consent done  [ ] Waterbirth declines, consent done  [x] GCT, passed  ________________________________________  [ ] OTC PP meds sent  [ ] Planning CS-ERAS pkt           ADMISSION DIAGNOSIS:  Labor and Delivery   Preeclampsia without severe features  Fetal cardiac anomaly  DISCHARGE DIAGNOSIS:     Preeclampsia without severe features  HOSPITAL COURSE:  37w0d  Cassandra Langley is a 31 year old female     [facility] 2021  8:08 AM  in for induction of labor.  Indication preeclampsia without severe features.      Labor Course: Cassandra Langley  is a 31 year old   at  37w0d presented to labor floor for induction for preeclampsia without severe features. Her pregnancy was also complicated by FGR in the 5th percentile and fetal VSD.  She received one dose of vaginal miso and a 70cc Carter balloon was placed at 1515. At  SROM occurred showing clear fluid, Carter balloon pulled out, and active labor began. Clindamycin was started for GBS prophylaxis. She used nitrous oxide and comfort measures including movement on birth ball and massage for pain management.      Delivery Course:  Quickly progressed to complete at 2231 and started pushing at 2232. Pushed effectively with CNM coaching and brought fetus to crown within several contractions. NICU was called to attend delivery. FHR with prolonged decel for 5 minutes while . Moderate variability throughout. Head delivered MOA and restituted to BALBIR . No nuchal cord. Shoulders easily delivered under maternal effort. Baby boy was born at 2346 and immediately placed on mom's abdomen. Spontaneous breath, vigorous cry, well flexed, Hr>100. IV pitocin started after delivery of infant. Delayed cord clamping for 1.5 minutes then clamped x2 and cut by Ty. Baby was briefly brought to warmer for NICU assessment and then returned skin to skin. Cord blood obtained for typing and genetic testing. Placenta spontaneously delivered intact without difficulty via Schultze mechanism. Inspection of vagina and perineum revealed a 1st degree laceration and bilateral labial lacerations. Perineal laceration was repaired in the usual fashion with 3.0 vicryl. Several interrupted sutures with 4.0 used to close labial lacerations bilaterally. 1% lidocaine was infiltrated before the repair. Hemostasis noted. Fundus is firm and midline.  Mom and baby are stable.      IUP at 37 weeks gestation delivered on 2021.     delivery of a viable Male infant.  Weight : 4 pounds 15 ounces   Apgars of 8 at 1 minute and 9 at 5  "minutes.  Labor was induced.  Medications administered  in labor:  Pain Rx nitrous oxide; Antibiotics Yes: clindamycin started  Perineum: 1st degree and Labial laceration  Placenta-mechanism: spontaneous, intact,  with a 3 vessel cord. IV oxytocin was given.  QBL was 402.  Anticipated Discharge Date: 9/3/21  Complications of pregnancy, labor and delivery: Low Birth Weight (<2500 gms), preeclampsia, fetal VSD, GBS positive and not adequately treated  Birth attendants: Ruth Etienne, GABRIELLA, RL**    INTERVAL HISTORY:  /89   Pulse 100   Temp 99.5  F (37.5  C) (Oral)   Resp 16   Ht 1.575 m (5' 2\")   Wt 77.2 kg (170 lb 1.6 oz)   LMP 2020   SpO2 99%   Breastfeeding Unknown   BMI 31.11 kg/m    Patient Vitals for the past 24 hrs:   BP Temp Temp src Pulse Resp   21 0827 127/89 99.5  F (37.5  C) Oral 100 16   21 2327 112/73 98.1  F (36.7  C) Oral 76 16   21 1510 119/83 99  F (37.2  C) Oral 92 18   21 1031 118/72 98.4  F (36.9  C) Oral 95 16     Pt stable, baby is rooming in. Feels well, no HA/ vision change/ epigastric pain  Infant doing well with VSD, inadequate treatment for positive GBS- may be discharged tonight  Breast feeding status:initiated and getting lactation help  # Discharge Pain Plan:   - During her hospitalization, Cassandra experienced pain due to .  The pain plan for discharge was discussed with Cassandra and her spouse and the plan was created in a collaborative fashion.    - tylenol/ ibuprofen and sitz baths    Complications since 2 hours post delivery: None  Patient is tolerating activity well, Voiding without difficulty, cramping is relieved by Ibuprophen, lochia is decreasing and patient denies clots.  Perineal pain is is relieved by Ibuprophen.  The perineum laceration is well approximated    Physical Exam:  General: Bright affect, loving with baby. Family supportive.   Breasts: soft, nontender, nipples intact, no cracking, redness or bruising. Pumping and " breastfeeding  Abdomen: soft, nontender, fundus below U.   Lochia: small amount, rubra, no clots or odor.   Perineum: well-approximated.   Extremities: no edema, Zion's negative.     Postpartum hemoglobin   Hemoglobin   Date Value Ref Range Status   2021 11.9 11.7 - 15.7 g/dL Final   2021 11.9 11.7 - 15.7 g/dL Final      Prenatal Labs:   Lab Results   Component Value Date    ABO O 2021    RH Pos 2021    AS Negative 2021    HEPBANG Nonreactive 2021    RUQIGG 26 2021     Rubella: immune  History of depression: none. Postpartum depression warning signs reviewed.    ASSESSMENT/PLAN:  Normal postpartum exam , Stable Post-partum day #2  Complications:breastfeeding difficulties- has plan to follow up with lactation  Preeclampsia without severe features- asymptomatic, has own BP cuff at home. Take BP 1-2 times/ day and follow up in one week. Call with HA/ vision change/ epigastric pain/ edema  Baby with cardiac anomaly- has plan to follow up with peds and peds cardiology  Plan d/c home or boarder status today. Home Visit Ordered- Yes: breastfeeding  RTC 1 week for BP check, 6 week postpartum check  Postpartum warning s/s reviewed, including bleeding/clots, fever, mastitis, or depression  Micheal/ patrica  Continue prenatal vitamins  Birthcontrol planned:IUD Mirena, plan to insert at 6-8 wks postpartum  PROCEDURES:      Current Discharge Medication List      START taking these medications    Details   acetaminophen (TYLENOL) 325 MG tablet Take 2 tablets (650 mg) by mouth every 4 hours as needed for mild pain or fever (greater than or equal to 38  C /100.4  F (oral) or 38.5  C/ 101.4  F (core).)    Associated Diagnoses:  (normal spontaneous vaginal delivery)      docusate sodium (COLACE) 100 MG capsule Take 1 capsule (100 mg) by mouth daily    Associated Diagnoses:  (normal spontaneous vaginal delivery)      ibuprofen (ADVIL/MOTRIN) 800 MG tablet Take 1 tablet (800 mg) by  mouth every 6 hours as needed for other (cramping)    Associated Diagnoses:  (normal spontaneous vaginal delivery)         CONTINUE these medications which have NOT CHANGED    Details   Prenatal Vit-Fe Fum-FA-Omega (PRENATAL MULTI +DHA PO) Take 1 tablet by mouth daily           GABRIELLA Barrios CNM

## 2021-09-03 NOTE — PLAN OF CARE
Data: Vital signs within normal limits. Postpartum checks within normal limits - see flow record. Patient eating and drinking normally. Patient able to empty bladder independently and is up ambulating. No apparent signs of infection. Patient performing self cares and is able to care for infant.  Action: Patient medicated during the shift for pain adequately with tylenol. Will call if she would like ibuprofen.. See MAR. Patient reassessed within 1 hour after each medication and pain was improved - patient stated she was comfortable. See flow record.  Response: Positive attachment behaviors observed with infant. Support person, , Ty, present.

## 2021-09-03 NOTE — PLAN OF CARE
[id number]:  VSS and postpartum assessments WDL.  Up ad richi with steady gait and independent with cares.  Bonding well with infant.  Breastfeeding on cue attempts this morning and improving this evening with infant now doing SNS at breast with supplementation of donor breastmilk and tolerating 12mls.  Mother also pumping and hand expressing.  Pain managed with tylenol and ibuprofen per MAR.  , Ty present and supportive.  EDS=0.  Birth certificate completed.  Will continue with postpartum cares and education per plan of care.

## 2021-09-03 NOTE — PLAN OF CARE
Data: Vital signs and postpartum assessments within normal limits.  Denies any symptoms of preeclampsia. Pt is up voiding, had a bowel movement and ambulating in the room with no problem. Pumping and getting nothing at the moment even with hand expression. Breastfeeding with the nipple shield and supplementing baby with 10-12 ml of donor breast milking using SNS/ finger feeding. Complain of cramping.  Action:  Medicated pt with Ibuprofen and Tylenol for pain control. Checked latch. Encouraged pt to soak in the tub twice a day and to keep pumping, Assisted with hand expression. Review of care plan, teaching, and discharge instructions done with pt. Pt was given a gift for baby and a copy of the discharge papers.  Response: Pt states understanding and comfort with infant cares and feeding. All questions about her care addressed. Pt is discharged and is boarding with baby.

## 2021-09-03 NOTE — LACTATION NOTE
This note was copied from a baby's chart.  Follow up visit this morning, parents independent in position and latching with nipple shield, giving supplement via SNS upon arrival. Baby feeding much better overnight but sleepy this time, watched them use different ways to try to keep Sarita awake during feed with good results, dad tried with finger feed after but he declined, intake 12 mL SNS @ breast this time. Sarita's weight loss minimal (0.5% at 24 hours) and bilirubin low intermediate risk level, diaper output WNL for age. Encouraged repeat weight check prior to discharge today.    Reviewed cues for hunger and satiety, how to tell if getting enough, breastfeeding chapter of postpartum book including stomach sizes in first week and encouraged increasing supplements liberally as Sarita tolerates. [place] to breastfeeding log and how to tell if getting enough, Lactation Resources and [facility] pump cleaning handouts, donor milk and formula options for supplementation after discharge and how to get it, how to do paced bottle feeding, and recommended lactation follow up within first week after discharge for support with early term infant transferring milk and establishing milk supply, weaning from nipple shield. Gave mom 20 mm nipple shield with instructions on how to use pulling more of nipple inside (16 mm nipple tends to stay back thus not stimulating supply well and mom starting to get sore). Cassandra has been regular with her pumping and excited to get drops from both sides today. She has Medela pump at home, discussed option of using rental pump to establish supply but for now she'll try her pump and take tubing for [employer] home with her in case she decides to use it later in week.

## 2021-09-03 NOTE — DISCHARGE INSTRUCTIONS
Preeclampsia   Call your doctor right away if you have any of the following:  - Edema (swelling) in your face or hands  - Rapid weight gain-about 1 pound or more in a day  - Headache  - Abdominal pain on your right side  - Vision problems (flashes or spots)  - You have questions or concerns once you return home.       Postpartum Vaginal Delivery Instructions    Activity       Ask family and friends for help when you need it.    Do not place anything in your vagina for 6 weeks.    You are not restricted on other activities, but take it easy for a few weeks to allow your body to recover from delivery.  You are able to do any activities you feel up to that point.    No driving until you have stopped taking your pain medications (usually two weeks after delivery).     Call your health care provider if you have any of these symptoms:       Increased pain, swelling, redness, or fluid around your stiches from an episiotomy or perineal tear.    A fever above 100.4 F (38 C) with or without chills when placing a thermometer under your tongue.    You soak a sanitary pad with blood within 1 hour, or you see blood clots larger than a golf ball.    Bleeding that lasts more than 6 weeks.    Vaginal discharge that smells bad.    Severe pain, cramping or tenderness in your lower belly area.    A need to urinate more frequently (use the toilet more often), more urgently (use the toilet very quickly), or it burns when you urinate.    Nausea and vomiting.    Redness, swelling or pain around a vein in your leg.    Problems breastfeeding or a red or painful area on your breast.    Chest pain and cough or are gasping for air.    Problems coping with sadness, anxiety, or depression.  If you have any concerns about hurting yourself or the baby, call your provider immediately.     You have questions or concerns after you return home.     Keep your hands clean:  Always wash your hands before touching your perineal area and stitches.  This  helps reduce your risk of infection.  If your hands aren't dirty, you may use an alcohol hand-rub to clean your hands. Keep your nails clean and short.

## 2021-09-07 ENCOUNTER — TELEPHONE (OUTPATIENT)
Dept: MATERNAL FETAL MEDICINE | Facility: CLINIC | Age: 31
End: 2021-09-07

## 2021-09-07 DIAGNOSIS — O16.3 ELEVATED BLOOD PRESSURE AFFECTING PREGNANCY IN THIRD TRIMESTER, ANTEPARTUM: Primary | ICD-10-CM

## 2021-09-07 NOTE — TELEPHONE ENCOUNTER
Cassandra called today stating her BP this morning was 139/97 and she was supposed to call if over 140/90. Cassandra states she has a slight HA, not needing to take Tylenol at this time, denies any other s/s of Pre E. PT also state she had a golf ball sized clot come out during a BM today, vaginal bleeding is now scant, will continue to monitor for increase bleeding/clots. Dr. Montoya notified of pt concerns, instructed pt to call if 160/110 due to previous dx of Pre eclampsia, try tylenol for HA, if HA worsens or is persistent to go to ED for evaluation. Pt would like to schedule BP check with MFM later this week, will schedule 6 week appt at her PCP. PT scheduled for 0800 on 9/9. Reviewed danger s/s of postpartum bleeding and Pre E. PT VU.   Maria Esther Jerez RN

## 2021-09-09 ENCOUNTER — OFFICE VISIT (OUTPATIENT)
Dept: MATERNAL FETAL MEDICINE | Facility: CLINIC | Age: 31
End: 2021-09-09
Attending: OBSTETRICS & GYNECOLOGY
Payer: COMMERCIAL

## 2021-09-09 VITALS
OXYGEN SATURATION: 100 % | HEART RATE: 91 BPM | DIASTOLIC BLOOD PRESSURE: 91 MMHG | SYSTOLIC BLOOD PRESSURE: 145 MMHG | RESPIRATION RATE: 16 BRPM

## 2021-09-09 DIAGNOSIS — K59.00 CONSTIPATION, UNSPECIFIED CONSTIPATION TYPE: Primary | ICD-10-CM

## 2021-09-09 DIAGNOSIS — O16.3 ELEVATED BLOOD PRESSURE AFFECTING PREGNANCY IN THIRD TRIMESTER, ANTEPARTUM: ICD-10-CM

## 2021-09-09 PROCEDURE — 99212 OFFICE O/P EST SF 10 MIN: CPT | Mod: 24 | Performed by: OBSTETRICS & GYNECOLOGY

## 2021-09-09 PROCEDURE — G0463 HOSPITAL OUTPT CLINIC VISIT: HCPCS

## 2021-09-09 RX ORDER — POLYETHYLENE GLYCOL 3350 17 G/17G
17 POWDER, FOR SOLUTION ORAL DAILY
Qty: 510 G | Refills: 0 | Status: SHIPPED | OUTPATIENT
Start: 2021-09-09 | End: 2022-12-06

## 2021-09-09 ASSESSMENT — PAIN SCALES - GENERAL: PAINLEVEL: NO PAIN (0)

## 2021-09-09 NOTE — NURSING NOTE
Pt here for PPV, s/p  w/ pre-e w/o SF. Has been monitoring BP at home, running high 130s/low 90s. Denies pre-e sx. Passed one approx [id number] red clot on  (presents picture on her phone), very little lochia since, no further clots. Afebrile. No saturating pads since discharge. Breast feeding with supplementing, had worked with lactation to use SNS+nipple shields while inpt. Baby up 7 oz since birth. Encouraged ongoing work with pediatrician and outpt lactation as needed. Pt seen by Dr. Dumont and Tiffanie. Discharged stable/ambulatory.

## 2021-09-09 NOTE — PROGRESS NOTES
MFM Postpartum Progress Note    S: Patient reports overall doing well. She notes she is just very tired, but feels like this is normal. Pain is overall controlled. Her lochia has decreased overall, but two days ago passed gold ball sized clot after BM. She continues to note scant lochia, but lighter than a period. Has had constipation for which she has been taking colace. Desires additional treatment for constipation. Denies lightheadedness, dizziness, chest pain, palpitations. Mood is stable. Baby boy is doing well per mother's report. She has been checking her BP at home twice a day and they have been 130-140s. She is not currently on any BP medications. Denies HA, vision changes, dyspnea, RUQ/epigastric pain.    O:  BP (!) 145/91 (BP Location: Left arm, Patient Position: Sitting, Cuff Size: Adult Regular)   Pulse 91   Resp 16   LMP 2020   SpO2 100%      Gen Appear: NAD  CV: RRR  Pulm: CTAB  Abdomen: soft, non-tender to palpation  Extremities: No LE edema    A/P: 31 year old  PPD 8 s/p . Pregnancy complicated by preeclampsia without severe features, FGR, and suspected fetal CHD identified as VSD. Doing well from postpartum standpoint.     Routine Postpartum  - She is overall doing well and following routine postpartum milestones.  - Continue prenatal vitamins  - Mood stable. Warning signs of postpartum depression reviewed.  - Rx for miralax sent to pharmacy for constipation.  - Contraception: Recommend continued pelvic rest until 6 week postpartum visit. Considering IUD.  - Pap: Last pap 2018. Repeat 2021.     Preeclampsia without severe features  - BPs normal to mild range without medications  - No signs or symptoms of severe features  - BP goals reviewed  - If BP continue to be elevated, discussed patient may develop chronic HTN and should establish care with PCP.    Patient has 6 week postpartum visit with primary OBGYN, Dr. Foley.      Seen and discussed with Dr. Moreno.    Alexey  MD Julia  Maternal-Fetal Medicine Fellow    Physician Attestation   I, Keith Moreno MD, saw this patient and agree with the findings and plan of care as documented in the note.      Items personally reviewed/procedural attestation: vitals and labs.    Keith Moreno MD     I spent a total of 15 minutes on the date of this encounter in the care of Cassandra Langley, includin minutes reviewing the patient's chart, 5 minutes in direct patient contact, 3 minutes documenting in the medical record, 2 minutes in discussion with consultants/other providers .  Please see note for details.

## 2021-10-08 PROBLEM — O35.BXX0 FETAL CARDIAC ANOMALY AFFECTING PREGNANCY, ANTEPARTUM: Status: RESOLVED | Noted: 2021-07-13 | Resolved: 2021-10-08

## 2021-10-08 PROBLEM — Z36.89 ENCOUNTER FOR TRIAGE IN PREGNANT PATIENT: Status: RESOLVED | Noted: 2021-08-09 | Resolved: 2021-10-08

## 2021-10-08 PROBLEM — O16.3 ELEVATED BLOOD PRESSURE AFFECTING PREGNANCY IN THIRD TRIMESTER, ANTEPARTUM: Status: RESOLVED | Noted: 2021-08-09 | Resolved: 2021-10-08

## 2021-10-12 ENCOUNTER — PRENATAL OFFICE VISIT (OUTPATIENT)
Dept: OBGYN | Facility: CLINIC | Age: 31
End: 2021-10-12
Payer: COMMERCIAL

## 2021-10-12 VITALS
HEART RATE: 80 BPM | BODY MASS INDEX: 28.89 KG/M2 | DIASTOLIC BLOOD PRESSURE: 76 MMHG | SYSTOLIC BLOOD PRESSURE: 124 MMHG | WEIGHT: 157 LBS | HEIGHT: 62 IN

## 2021-10-12 DIAGNOSIS — Z30.011 OCP (ORAL CONTRACEPTIVE PILLS) INITIATION: ICD-10-CM

## 2021-10-12 DIAGNOSIS — Z23 NEED FOR PROPHYLACTIC VACCINATION AND INOCULATION AGAINST INFLUENZA: ICD-10-CM

## 2021-10-12 PROBLEM — O09.93 SUPERVISION OF HIGH RISK PREGNANCY IN THIRD TRIMESTER: Status: RESOLVED | Noted: 2021-02-17 | Resolved: 2021-10-12

## 2021-10-12 PROCEDURE — G0145 SCR C/V CYTO,THINLAYER,RESCR: HCPCS | Performed by: OBSTETRICS & GYNECOLOGY

## 2021-10-12 PROCEDURE — 87624 HPV HI-RISK TYP POOLED RSLT: CPT | Performed by: OBSTETRICS & GYNECOLOGY

## 2021-10-12 PROCEDURE — 90686 IIV4 VACC NO PRSV 0.5 ML IM: CPT | Performed by: OBSTETRICS & GYNECOLOGY

## 2021-10-12 PROCEDURE — 90471 IMMUNIZATION ADMIN: CPT | Performed by: OBSTETRICS & GYNECOLOGY

## 2021-10-12 RX ORDER — ACETAMINOPHEN AND CODEINE PHOSPHATE 120; 12 MG/5ML; MG/5ML
0.35 SOLUTION ORAL DAILY
Qty: 90 TABLET | Refills: 3 | Status: SHIPPED | OUTPATIENT
Start: 2021-10-12 | End: 2022-07-18

## 2021-10-12 ASSESSMENT — ANXIETY QUESTIONNAIRES
7. FEELING AFRAID AS IF SOMETHING AWFUL MIGHT HAPPEN: NOT AT ALL
6. BECOMING EASILY ANNOYED OR IRRITABLE: NOT AT ALL
GAD7 TOTAL SCORE: 0
1. FEELING NERVOUS, ANXIOUS, OR ON EDGE: NOT AT ALL
3. WORRYING TOO MUCH ABOUT DIFFERENT THINGS: NOT AT ALL
5. BEING SO RESTLESS THAT IT IS HARD TO SIT STILL: NOT AT ALL
2. NOT BEING ABLE TO STOP OR CONTROL WORRYING: NOT AT ALL

## 2021-10-12 ASSESSMENT — PATIENT HEALTH QUESTIONNAIRE - PHQ9
SUM OF ALL RESPONSES TO PHQ QUESTIONS 1-9: 1
5. POOR APPETITE OR OVEREATING: NOT AT ALL

## 2021-10-12 ASSESSMENT — MIFFLIN-ST. JEOR: SCORE: 1375.4

## 2021-10-12 NOTE — PATIENT INSTRUCTIONS
Follow up with your primary care provider for your other medical problems.  Continue self breast exam.  Increase physical activity and exercise.  Lab and pap smear results will be called to the patient.  Co-testing done today and will repeat in 5yrs if  Normal.  Usual safety and preventative measures counseling done.  Weight loss encouraged.  Flu Shot today.  May resume all normal activities.  Will start minipill for contraception while breast feeding.  May call once weaning or done with breast feeding for nuvaring prescription.

## 2021-10-12 NOTE — PROGRESS NOTES
"  SUBJECTIVE:  Cassandra Langley,  is here for a postpartum visit.  She had a  on 21 delivering a healthy baby boy, named Sarita weighing 4 lbs 15.4 oz at term.      HPI:  Here today for postpartum visit --doing great!  Baby Sarita has been good baby.  Has not yet had surgery --ended up not having tetralogy of fallot but does have VSD which will eventually be corrected surgically.  Maybe Nov/Dec?  Seeing cardiology every 2 weeks.  Mita is doing well.  Bleeding stopped 2wks ago.  Occ brownish discharge.  Eating/drinking well.  No bowel/bladder issues.  Has not resumed SA.  Has used IUD in past as well as nuvaring.  Uncertain about family planning --leaning towards minipill  Moods good --has handled everything very well; return to work dependent on Sarita's surgery planning.    Ended up being induced at [facility] at 37wks for FGR and pre-eclampsia.  Labor went very quickly after cervical ripening.  Pushed only 16 minutes!!  -completed covid vaccine  -agrees to flu shot today      Last PHQ-9 score on record=   PHQ-9 SCORE 10/12/2021   PHQ-9 Total Score MyChart -   PHQ-9 Total Score 1     IVAN-7 SCORE 2019 2021 10/12/2021   Total Score - 0 (minimal anxiety) -   Total Score 0 0 0       Pap:   Lab Results   Component Value Date    PAP NIL 2018    PAP NIL 10/15/2015        Delivery complications:  Yes, hypertension/preeclampsia  Breast feeding:  pumping  Bladder problems:  No  Bowel problems/hemorrhoids:  Yes, hemorrhoids  Episiotomy/laceration/incision healed? Yes: still a little tender  Vaginal flow: No  [place]:  No  Contraception: would like to do NuvaRing  Emotional adjustment:  doing well  Back to work: depends on baby surgery for VSD    12 point review of systems negative other than symptoms noted below or in the HPI.  Skin: hives    OBJECTIVE:  Vitals: /76   Pulse 80   Ht 1.575 m (5' 2\")   Wt 71.2 kg (157 lb)   LMP 2020   BMI 28.72 kg/m    BMI= Body " mass index is 28.72 kg/m .  General - pleasant female in no acute distress.  Breast -  deferred  Abdomen - No incision  Pelvic - EG: normal adult female, BUS: within normal limits, Vagina: well rugated, no discharge, Cervix: no lesions or CMT, Uterus: firm, normal sized and nontender, midplane in position. Adnexae: no masses or tenderness.  Rectovaginal - deferred.    ASSESSMENT:    ICD-10-CM    1. Routine postpartum follow-up  Z39.2 Pap imaged thin layer screen with HPV - recommended age 30 - 65 years (select HPV order below)   2. OCP (oral contraceptive pills) initiation  Z30.011 norethindrone (MICRONOR) 0.35 MG tablet       PLAN:  May resume normal activities without restrictions.  Pap smear was done today.    Full counseling was provided, and all questions were answered.   Return to clinic in one year for an annual visit.     Patient Instructions   Follow up with your primary care provider for your other medical problems.  Continue self breast exam.  Increase physical activity and exercise.  Lab and pap smear results will be called to the patient.  Co-testing done today and will repeat in 5yrs if  Normal.  Usual safety and preventative measures counseling done.  Weight loss encouraged.  Flu Shot today.  May resume all normal activities.  Will start minipill for contraception while breast feeding.  May call once weaning or done with breast feeding for nuvaring prescription.    Jes Foley MD

## 2021-10-13 ASSESSMENT — ANXIETY QUESTIONNAIRES: GAD7 TOTAL SCORE: 0

## 2021-10-15 LAB
BKR LAB AP GYN ADEQUACY: NORMAL
BKR LAB AP GYN INTERPRETATION: NORMAL
BKR LAB AP HPV REFLEX: NORMAL
BKR LAB AP PREVIOUS ABNORMAL: NORMAL
PATH REPORT.COMMENTS IMP SPEC: NORMAL
PATH REPORT.RELEVANT HX SPEC: NORMAL

## 2021-10-19 LAB
HUMAN PAPILLOMA VIRUS 16 DNA: NEGATIVE
HUMAN PAPILLOMA VIRUS 18 DNA: NEGATIVE
HUMAN PAPILLOMA VIRUS FINAL DIAGNOSIS: NORMAL
HUMAN PAPILLOMA VIRUS OTHER HR: NEGATIVE

## 2021-10-27 ENCOUNTER — MEDICAL CORRESPONDENCE (OUTPATIENT)
Dept: HEALTH INFORMATION MANAGEMENT | Facility: CLINIC | Age: 31
End: 2021-10-27
Payer: COMMERCIAL

## 2021-10-27 LAB
PATH REPORT.COMMENTS IMP SPEC: NORMAL
PATH REPORT.COMMENTS IMP SPEC: NORMAL
PATH REPORT.FINAL DX SPEC: NORMAL
PATH REPORT.GROSS SPEC: NORMAL
PATH REPORT.MICROSCOPIC SPEC OTHER STN: NORMAL
PATH REPORT.RELEVANT HX SPEC: NORMAL
PHOTO IMAGE: NORMAL

## 2021-11-02 DIAGNOSIS — Z30.011 OCP (ORAL CONTRACEPTIVE PILLS) INITIATION: ICD-10-CM

## 2021-11-02 RX ORDER — ACETAMINOPHEN AND CODEINE PHOSPHATE 120; 12 MG/5ML; MG/5ML
SOLUTION ORAL
Qty: 28 TABLET | Refills: 12 | OUTPATIENT
Start: 2021-11-02

## 2021-11-02 NOTE — TELEPHONE ENCOUNTER
"Requested Prescriptions   Pending Prescriptions Disp Refills     norethindrone (MICRONOR) 0.35 MG tablet [Pharmacy Med Name: NORETHINDRONE 0.35 MG TABLET] 28 tablet 12     Sig: TAKE 1 TABLET BY MOUTH EVERY DAY       Contraceptives Protocol Passed - 11/2/2021 12:32 PM        Passed - Patient is not a current smoker if age is 35 or older        Passed - Recent (12 mo) or future (30 days) visit within the authorizing provider's specialty     Patient has had an office visit with the authorizing provider or a provider within the authorizing providers department within the previous 12 mos or has a future within next 30 days. See \"Patient Info\" tab in inbasket, or \"Choose Columns\" in Meds & Orders section of the refill encounter.              Passed - Medication is active on med list        Passed - No active pregnancy on record        Passed - No positive pregnancy test in past 12 months           Last Written Prescription Date:  10/12/2021  Last Fill Quantity: 90,  # refills: 3   Last office visit: 11/26/2019 with prescribing provider:  Dr. Foley   Future Office Visit:      Refills available  Geri Sanford RN on 11/2/2021 at 4:44 PM        "

## 2022-03-10 ENCOUNTER — MEDICAL CORRESPONDENCE (OUTPATIENT)
Dept: HEALTH INFORMATION MANAGEMENT | Facility: CLINIC | Age: 32
End: 2022-03-10
Payer: COMMERCIAL

## 2022-07-18 DIAGNOSIS — Z30.011 OCP (ORAL CONTRACEPTIVE PILLS) INITIATION: ICD-10-CM

## 2022-07-18 RX ORDER — ACETAMINOPHEN AND CODEINE PHOSPHATE 120; 12 MG/5ML; MG/5ML
SOLUTION ORAL
Qty: 84 TABLET | Refills: 0 | Status: SHIPPED | OUTPATIENT
Start: 2022-07-18 | End: 2022-10-10

## 2022-07-18 NOTE — TELEPHONE ENCOUNTER
"Requested Prescriptions   Pending Prescriptions Disp Refills     norethindrone (MICRONOR) 0.35 MG tablet [Pharmacy Med Name: NORETHINDRONE 0.35 MG TABLET] 84 tablet 4     Sig: TAKE 1 TABLET BY MOUTH EVERY DAY       Contraceptives Protocol Passed - 7/18/2022 12:37 AM        Passed - Patient is not a current smoker if age is 35 or older        Passed - Recent (12 mo) or future (30 days) visit within the authorizing provider's specialty     Patient has had an office visit with the authorizing provider or a provider within the authorizing providers department within the previous 12 mos or has a future within next 30 days. See \"Patient Info\" tab in inbasket, or \"Choose Columns\" in Meds & Orders section of the refill encounter.              Passed - Medication is active on med list        Passed - No active pregnancy on record        Passed - No positive pregnancy test in past 12 months           Last OV: 10/12/21    Future Appointments 7/18/2022 - 1/14/2023      Date Visit Type Length Department Provider     10/13/2022  8:50 AM PHYSICAL 20 min [facility] Jes Foley MD    Location Instructions:     The clinic is located at [address] [place] [address], [address] [place] [id number]                   Medication is being filled for 1 time refill only due to:  Patient needs to be seen because due for annual appt. last OV 10/12/21 for prenatal visit. has upcoming annual scheduled.     Klaudia Topete RN      "

## 2022-10-09 DIAGNOSIS — Z30.011 OCP (ORAL CONTRACEPTIVE PILLS) INITIATION: ICD-10-CM

## 2022-10-10 RX ORDER — ACETAMINOPHEN AND CODEINE PHOSPHATE 120; 12 MG/5ML; MG/5ML
SOLUTION ORAL
Qty: 28 TABLET | Refills: 0 | Status: SHIPPED | OUTPATIENT
Start: 2022-10-10 | End: 2022-11-03

## 2022-10-10 NOTE — TELEPHONE ENCOUNTER
"Requested Prescriptions   Pending Prescriptions Disp Refills     norethindrone (MICRONOR) 0.35 MG tablet [Pharmacy Med Name: NORETHINDRONE 0.35 MG TABLET] 84 tablet 0     Sig: TAKE 1 TABLET BY MOUTH EVERY DAY       Contraceptives Protocol Passed - 10/9/2022  7:59 AM        Passed - Patient is not a current smoker if age is 35 or older        Passed - Recent (12 mo) or future (30 days) visit within the authorizing provider's specialty     Patient has had an office visit with the authorizing provider or a provider within the authorizing providers department within the previous 12 mos or has a future within next 30 days. See \"Patient Info\" tab in inbasket, or \"Choose Columns\" in Meds & Orders section of the refill encounter.              Passed - Medication is active on med list        Passed - No active pregnancy on record        Passed - No positive pregnancy test in past 12 months           Refill for one month sent  Appointment needed for further refills  Geri Sanford RN on 10/10/2022 at 8:50 AM    "

## 2022-10-15 ENCOUNTER — HEALTH MAINTENANCE LETTER (OUTPATIENT)
Age: 32
End: 2022-10-15

## 2022-11-03 DIAGNOSIS — Z30.011 OCP (ORAL CONTRACEPTIVE PILLS) INITIATION: ICD-10-CM

## 2022-11-03 RX ORDER — ACETAMINOPHEN AND CODEINE PHOSPHATE 120; 12 MG/5ML; MG/5ML
SOLUTION ORAL
Qty: 28 TABLET | Refills: 1 | Status: SHIPPED | OUTPATIENT
Start: 2022-11-03 | End: 2022-12-01

## 2022-11-03 NOTE — TELEPHONE ENCOUNTER
"Requested Prescriptions   Pending Prescriptions Disp Refills     norethindrone (MICRONOR) 0.35 MG tablet [Pharmacy Med Name: NORETHINDRONE 0.35 MG TABLET] 28 tablet 0     Sig: TAKE 1 TABLET BY MOUTH EVERY DAY       Contraceptives Protocol Failed - 11/3/2022  1:34 PM        Failed - Recent (12 mo) or future (30 days) visit within the authorizing provider's specialty     Patient has had an office visit with the authorizing provider or a provider within the authorizing providers department within the previous 12 mos or has a future within next 30 days. See \"Patient Info\" tab in inbasket, or \"Choose Columns\" in Meds & Orders section of the refill encounter.              Passed - Patient is not a current smoker if age is 35 or older        Passed - Medication is active on med list        Passed - No active pregnancy on record        Passed - No positive pregnancy test in past 12 months             Last Written Prescription Date:  10/10/22  Last Fill Quantity: 28, #0 refill  Last office visit: 10/12/21      Future Appointments 11/3/2022 - 5/2/2023      Date Visit Type Length Department Provider     12/6/2022 11:40 AM PHYSICAL 20 min [facility] Jes Foley MD    Location Instructions:     The clinic is located at [address] [place] [address], [address] [place] [id number]                 Medication is being filled for 1 time refill only due to:  Patient needs to be seen because it has been more than one year since last visit. future appt scheduled.     Klaudia Topete RN    "

## 2022-12-01 DIAGNOSIS — Z30.011 OCP (ORAL CONTRACEPTIVE PILLS) INITIATION: ICD-10-CM

## 2022-12-01 RX ORDER — ACETAMINOPHEN AND CODEINE PHOSPHATE 120; 12 MG/5ML; MG/5ML
SOLUTION ORAL
Qty: 28 TABLET | Refills: 0 | Status: SHIPPED | OUTPATIENT
Start: 2022-12-01 | End: 2022-12-06

## 2022-12-01 NOTE — TELEPHONE ENCOUNTER
"Requested Prescriptions   Pending Prescriptions Disp Refills     norethindrone (MICRONOR) 0.35 MG tablet [Pharmacy Med Name: NORETHINDRONE 0.35 MG TABLET] 28 tablet 1     Sig: TAKE 1 TABLET BY MOUTH EVERY DAY       Contraceptives Protocol Passed - 12/1/2022  1:31 PM        Passed - Patient is not a current smoker if age is 35 or older        Passed - Recent (12 mo) or future (30 days) visit within the authorizing provider's specialty     Patient has had an office visit with the authorizing provider or a provider within the authorizing providers department within the previous 12 mos or has a future within next 30 days. See \"Patient Info\" tab in inbasket, or \"Choose Columns\" in Meds & Orders section of the refill encounter.              Passed - Medication is active on med list        Passed - No active pregnancy on record        Passed - No positive pregnancy test in past 12 months           Last Written Prescription Date:  11/3/22  Last Fill Quantity: 28,  # refills: 1   Last office visit: Visit date 10/12/2021 with prescribing provider:  Dr Foley   Future Office Visit:   Next 5 appointments (look out 90 days)    Dec 06, 2022 11:40 AM  PHYSICAL with Jes Foley MD  [facility] ([facility] ) [address] [place] [id number] [phone]         Prescription approved per [facility] Refill Protocol.  Deena Zapata RN on 12/1/2022 at 2:57 PM          "

## 2022-12-03 ENCOUNTER — HEALTH MAINTENANCE LETTER (OUTPATIENT)
Age: 32
End: 2022-12-03

## 2022-12-05 PROBLEM — Z30.41 USES ORAL CONTRACEPTION: Status: ACTIVE | Noted: 2022-12-05

## 2022-12-06 ENCOUNTER — OFFICE VISIT (OUTPATIENT)
Dept: OBGYN | Facility: CLINIC | Age: 32
End: 2022-12-06
Payer: COMMERCIAL

## 2022-12-06 VITALS
HEIGHT: 62 IN | BODY MASS INDEX: 29.21 KG/M2 | SYSTOLIC BLOOD PRESSURE: 110 MMHG | DIASTOLIC BLOOD PRESSURE: 70 MMHG | WEIGHT: 158.7 LBS

## 2022-12-06 DIAGNOSIS — Z30.011 OCP (ORAL CONTRACEPTIVE PILLS) INITIATION: ICD-10-CM

## 2022-12-06 DIAGNOSIS — Z23 NEED FOR PROPHYLACTIC VACCINATION AND INOCULATION AGAINST INFLUENZA: ICD-10-CM

## 2022-12-06 DIAGNOSIS — Z01.419 ENCOUNTER FOR GYNECOLOGICAL EXAMINATION WITHOUT ABNORMAL FINDING: Primary | ICD-10-CM

## 2022-12-06 PROCEDURE — 90686 IIV4 VACC NO PRSV 0.5 ML IM: CPT | Performed by: OBSTETRICS & GYNECOLOGY

## 2022-12-06 PROCEDURE — 0124A COVID-19 VACCINE BIVALENT BOOSTER 12+ (PFIZER): CPT | Performed by: OBSTETRICS & GYNECOLOGY

## 2022-12-06 PROCEDURE — 90471 IMMUNIZATION ADMIN: CPT | Performed by: OBSTETRICS & GYNECOLOGY

## 2022-12-06 PROCEDURE — 99395 PREV VISIT EST AGE 18-39: CPT | Mod: 25 | Performed by: OBSTETRICS & GYNECOLOGY

## 2022-12-06 PROCEDURE — 91312 COVID-19 VACCINE BIVALENT BOOSTER 12+ (PFIZER): CPT | Performed by: OBSTETRICS & GYNECOLOGY

## 2022-12-06 RX ORDER — ACETAMINOPHEN AND CODEINE PHOSPHATE 120; 12 MG/5ML; MG/5ML
0.35 SOLUTION ORAL DAILY
Qty: 84 TABLET | Refills: 3 | Status: SHIPPED | OUTPATIENT
Start: 2022-12-06 | End: 2023-12-05

## 2022-12-06 ASSESSMENT — ANXIETY QUESTIONNAIRES
7. FEELING AFRAID AS IF SOMETHING AWFUL MIGHT HAPPEN: NOT AT ALL
2. NOT BEING ABLE TO STOP OR CONTROL WORRYING: NOT AT ALL
6. BECOMING EASILY ANNOYED OR IRRITABLE: NOT AT ALL
5. BEING SO RESTLESS THAT IT IS HARD TO SIT STILL: NOT AT ALL
3. WORRYING TOO MUCH ABOUT DIFFERENT THINGS: NOT AT ALL
GAD7 TOTAL SCORE: 0
GAD7 TOTAL SCORE: 0
1. FEELING NERVOUS, ANXIOUS, OR ON EDGE: NOT AT ALL
IF YOU CHECKED OFF ANY PROBLEMS ON THIS QUESTIONNAIRE, HOW DIFFICULT HAVE THESE PROBLEMS MADE IT FOR YOU TO DO YOUR WORK, TAKE CARE OF THINGS AT HOME, OR GET ALONG WITH OTHER PEOPLE: NOT DIFFICULT AT ALL

## 2022-12-06 ASSESSMENT — PATIENT HEALTH QUESTIONNAIRE - PHQ9
SUM OF ALL RESPONSES TO PHQ QUESTIONS 1-9: 0
5. POOR APPETITE OR OVEREATING: NOT AT ALL

## 2022-12-06 NOTE — PATIENT INSTRUCTIONS
Follow up with your primary care provider for your other medical problems.  Continue self breast exam.  Increase physical activity and exercise.  Usual safety and preventative measures counseling done.  BMI >25  Weight loss encouraged.  Last pap smear (2021) was normal and negative for the DNA of high risk HPV subtypes.  No pap was obtained this year.  This was discussed with the patient and she agrees with the plan.   Flu and covid booster today.

## 2022-12-06 NOTE — PROGRESS NOTES
Cassandra is a 32 year old  female who presents for annual exam.     Besides routine health maintenance, she has no other health concerns today .    HPI:  Here today for yearly exam --doing well.  Regular, monthly menses on progesterone only pill.  Usually only 3-4d bleeding, no cramping.  +SA --no issues.  Have discussed the possibility of #2 but likely waiting a bit.  No bowel/bladder issues    ; works in [employer] for [employer]; Sarita is 14mo!!!  -staying active; doing some home strength training but struggles with cardio; knows she needs to do more  +SBE --no issues; stopped breast feeding in Sept at 1yo  PCP -Deja Parkinson PA-C --sees as needed  -agrees to flu and covid booster today       GYNECOLOGIC HISTORY:    Patient's last menstrual period was 2022 (exact date).    Regular menses? yes  Menses every 28 days.  Length of menses: 4 days    Her current contraception method is: oral contraceptives.  She  reports that she has never smoked. She has never used smokeless tobacco.    Patient is sexually active.  STD testing offered?  Declined  Last PHQ-9 score on record =   PHQ-9 SCORE 2022   PHQ-9 Total Score MyChart -   PHQ-9 Total Score 0     Last GAD7 score on record =   IVAN-7 SCORE 2022   Total Score -   Total Score 0     Alcohol Score = 1    HEALTH MAINTENANCE:  Cholesterol: (No results found for: CHOL none  Last Mammo: Not applicable, Result: Not applicable, Next Mammo: Due at age 40   Pap:  Lab Results   Component Value Date    GYNINTERP  10/12/2021     Negative for Intraepithelial Lesion or Malignancy (NILM), HPV NEG    PAP NIL 2018    PAP NIL 10/15/2015     Colonoscopy:  NA, Result: Not applicable, Next Colonoscopy: 44 years.  Dexa:  NA    Health maintenance updated:  Yes     HISTORY:  OB History    Para Term  AB Living   1 1 1 0 0 1   SAB IAB Ectopic Multiple Live Births   0 0 0 0 1      # Outcome Date GA Lbr [place]/2nd Weight Sex Delivery  "Anes PTL Lv   1 Term 09/01/21 37w0d 02:31 / 00:15 2.25 kg (4 lb 15.4 oz) M Vag-Spont Nitrous N AISHA      Complications: Preeclampsia/Hypertension      Name: Sarita      Apgar1: 8  Apgar5: 9       Patient Active Problem List   Diagnosis     Uses oral contraception     Past Surgical History:   Procedure Laterality Date     NO HISTORY OF SURGERY        Social History     Tobacco Use     Smoking status: Never     Smokeless tobacco: Never   Substance Use Topics     Alcohol use: Not Currently     Alcohol/week: 0.0 standard drinks     Comment: Socially, maybe once a month      Problem (# of Occurrences) Relation (Name,Age of Onset)    Thyroid Disease (1) Mother (59): Thyroid removed     Hyperlipidemia (1) Father    No Known Problems (7) Maternal Grandmother, Maternal Grandfather, Paternal Grandmother, Paternal Grandfather, Sister, Brother, Other            Current Outpatient Medications   Medication Sig     norethindrone (MICRONOR) 0.35 MG tablet Take 1 tablet (0.35 mg) by mouth daily     Prenatal Vit-Fe Fum-FA-Omega (PRENATAL MULTI +DHA PO) Take 1 tablet by mouth daily      acetaminophen (TYLENOL) 325 MG tablet Take 2 tablets (650 mg) by mouth every 4 hours as needed for mild pain or fever (greater than or equal to 38  C /100.4  F (oral) or 38.5  C/ 101.4  F (core).) (Patient not taking: Reported on 12/6/2022)     No current facility-administered medications for this visit.     Allergies   Allergen Reactions     Keflex [Cephalosporins] Hives       Past medical, surgical, social and family histories were reviewed and updated in EPIC.    ROS:   12 point review of systems negative other than symptoms noted below or in the HPI.  No urinary frequency or dysuria, bladder or kidney problems, Normal menstrual cycles    EXAM:  /70   Ht 1.575 m (5' 2\")   Wt 72 kg (158 lb 11.2 oz)   LMP 12/01/2022 (Exact Date)   Breastfeeding No   BMI 29.03 kg/m     BMI: Body mass index is 29.03 kg/m .    PHYSICAL EXAM:  Constitutional: "   Appearance: Well nourished, well developed, alert, in no acute distress  Neck:  Lymph Nodes:  No lymphadenopathy present    Thyroid:  Gland size normal, nontender, no nodules or masses present  on palpation  Chest:  Respiratory Effort:  Breathing unlabored  Cardiovascular:    Heart: Auscultation:  Regular rate, normal rhythm, no murmurs present  Breasts: Palpation of Breasts and Axillae:  No masses present on palpation, no breast tenderness., Axillary Lymph Nodes:  No lymphadenopathy present. and No nodularity, asymmetry or nipple discharge bilaterally.  Gastrointestinal:   Abdominal Examination:  Abdomen nontender to palpation, tone normal without rigidity or guarding, no masses present, umbilicus without lesions   Liver and Spleen:  No hepatomegaly present, liver nontender to palpation    Hernias:  No hernias present  Lymphatic: Lymph Nodes:  No other lymphadenopathy present  Skin:  General Inspection:  No rashes present, no lesions present, no areas of  discoloration  Neurologic:    Mental Status:  Oriented X3.  Normal strength and tone, sensory exam                grossly normal, mentation intact and speech normal.    Psychiatric:   Mentation appears normal and affect normal/bright.         Pelvic Exam:  External Genitalia:     Normal appearance for age, no discharge present, no tenderness present, no inflammatory lesions present, color normal  Vagina:     Normal vaginal vault without central or paravaginal defects, no discharge present, no inflammatory lesions present, no masses present  Bladder:     Nontender to palpation  Urethra:   Urethral Body:  Urethra palpation normal, urethra structural support normal   Urethral Meatus:  No erythema or lesions present  Cervix:     Appearance healthy, no lesions present, nontender to palpation, no bleeding present  Uterus:     Uterus: firm, normal sized and nontender, anteverted in position.   Adnexa:     No adnexal tenderness present, no adnexal masses  present  Perineum:     Perineum within normal limits, no evidence of trauma, no rashes or skin lesions present  Anus:     Anus within normal limits, no hemorrhoids present  Inguinal Lymph Nodes:     No lymphadenopathy present  Pubic Hair:     Normal pubic hair distribution for age  Genitalia and Groin:     No rashes present, no lesions present, no areas of discoloration, no masses present      COUNSELING:   Reviewed preventive health counseling, as reflected in patient instructions  Special attention given to:        Regular exercise       Healthy diet/nutrition       Contraception       Family planning    BMI: Body mass index is 29.03 kg/m .  Weight management plan: Discussed healthy diet and exercise guidelines    ASSESSMENT:  32 year old female with satisfactory annual exam.    ICD-10-CM    1. Encounter for gynecological examination without abnormal finding  Z01.419       2. OCP (oral contraceptive pills) initiation  Z30.011 norethindrone (MICRONOR) 0.35 MG tablet      3. Need for prophylactic vaccination and inoculation against influenza  Z23 INFLUENZA VACCINE IM > 6 MONTHS VALENT IIV4 (AFLURIA/FLUZONE)          PLAN:  Patient Instructions   Follow up with your primary care provider for your other medical problems.  Continue self breast exam.  Increase physical activity and exercise.  Usual safety and preventative measures counseling done.  BMI >25  Weight loss encouraged.  Last pap smear (2021) was normal and negative for the DNA of high risk HPV subtypes.  No pap was obtained this year.  This was discussed with the patient and she agrees with the plan.   Flu and covid booster today.      Jes Foley MD

## 2023-11-13 ENCOUNTER — PATIENT OUTREACH (OUTPATIENT)
Dept: CARE COORDINATION | Facility: CLINIC | Age: 33
End: 2023-11-13
Payer: COMMERCIAL

## 2023-11-27 ENCOUNTER — PATIENT OUTREACH (OUTPATIENT)
Dept: CARE COORDINATION | Facility: CLINIC | Age: 33
End: 2023-11-27
Payer: COMMERCIAL

## 2023-12-05 DIAGNOSIS — Z30.011 OCP (ORAL CONTRACEPTIVE PILLS) INITIATION: ICD-10-CM

## 2023-12-05 RX ORDER — ACETAMINOPHEN AND CODEINE PHOSPHATE 120; 12 MG/5ML; MG/5ML
0.35 SOLUTION ORAL DAILY
Qty: 28 TABLET | Refills: 0 | Status: SHIPPED | OUTPATIENT
Start: 2023-12-05 | End: 2023-12-27

## 2023-12-05 NOTE — TELEPHONE ENCOUNTER
"Requested Prescriptions   Pending Prescriptions Disp Refills    norethindrone (MICRONOR) 0.35 MG tablet [Pharmacy Med Name: NORETHINDRONE 0.35 MG TABLET] 84 tablet 3     Sig: TAKE 1 TABLET BY MOUTH EVERY DAY       Contraceptives Protocol Passed - 12/5/2023  1:16 AM        Passed - Patient is not a current smoker if age is 35 or older        Passed - Recent (12 mo) or future (30 days) visit within the authorizing provider's specialty     Patient has had an office visit with the authorizing provider or a provider within the authorizing providers department within the previous 12 mos or has a future within next 30 days. See \"Patient Info\" tab in inbasket, or \"Choose Columns\" in Meds & Orders section of the refill encounter.              Passed - Medication is active on med list        Passed - No active pregnancy on record        Passed - No positive pregnancy test in past 12 months           Last Written Prescription Date:  12/6/22  Last Fill Quantity: 84,  # refills: 3   Last office visit: 12/6/2022 ; last virtual visit: Visit date not found with prescribing provider:  Og   Future Office Visit:  NONE    Medication is being filled for 1 time refill only due to:  Patient needs to be seen because it has been more than one year since last visit.  [employer] message sent as reminder to schedule appointment  Mony Pro RN on 12/5/2023 at 6:00 AM            "

## 2023-12-27 DIAGNOSIS — Z30.011 OCP (ORAL CONTRACEPTIVE PILLS) INITIATION: ICD-10-CM

## 2023-12-27 RX ORDER — ACETAMINOPHEN AND CODEINE PHOSPHATE 120; 12 MG/5ML; MG/5ML
0.35 SOLUTION ORAL DAILY
Qty: 84 TABLET | Refills: 0 | Status: SHIPPED | OUTPATIENT
Start: 2023-12-27 | End: 2024-02-06

## 2023-12-27 NOTE — TELEPHONE ENCOUNTER
Requested Prescriptions   Pending Prescriptions Disp Refills    norethindrone (MICRONOR) 0.35 MG tablet [Pharmacy Med Name: NORETHINDRONE 0.35 MG TABLET] 84 tablet 1     Sig: TAKE 1 TABLET BY MOUTH EVERY DAY       Contraceptives Protocol Failed - 12/27/2023 12:31 PM        Failed - Recent (12 mo) or future (30 days) visit within the authorizing provider's specialty     The patient must have completed an in-person or virtual visit within the past 12 months or has a future visit scheduled within the next 90 days with the authorizing provider s specialty.  Urgent care and e-visits do not quality as an office visit for this protocol.          Passed - Patient is not a current smoker if age is 35 or older        Passed - Medication is active on med list        Passed - No active pregnancy on record        Passed - No positive pregnancy test in past 12 months           Appt scheduled for 2/2/24  Prescription approved per [facility] Refill Protocol.  Geri Sanford RN on 12/27/2023 at 3:24 PM

## 2024-01-07 ENCOUNTER — HEALTH MAINTENANCE LETTER (OUTPATIENT)
Age: 34
End: 2024-01-07

## 2024-01-31 NOTE — PROGRESS NOTES
"Cassandra is a 33 year old  female who presents for annual exam.     Besides routine health maintenance, she has no other health concerns today .    HPI:  Here today for yearly exam --doing well.  Regular, monthly menses x 4d with current OCP.  Would like to continue her POP.  No intermenstrual bleeding/spotting.  +SA --no issues.  Considering TTC #2 this fall.  No bowel/bladder issues.  Rare leak with cough or sneeze    ; works in [employer] for [employer]; Raissa is 1yo and busy!!  -staying active; got walking treadmill at home which she uses at least 3d/wk x 30min; also trying to do some core work  +SBE --no issues  PCP -[facility] --sees as needed  -had flu and covid vaccines  -has not started PNV but will start this summer      GYNECOLOGIC HISTORY:    Patient's last menstrual period was 2024 (approximate).    Regular menses? yes  Menses every 30 days.  Length of menses: 4 days    Her current contraception method is: oral contraceptives.  She  reports that she has never smoked. She has never used smokeless tobacco.    Patient is sexually active.  STD testing offered?  Declined  Last PHQ-9 score on record =       2024     2:13 PM   PHQ-9 SCORE   PHQ-9 Total Score 0     Last GAD7 score on record =       2024     2:13 PM   IVAN-7 SCORE   Total Score 0     Alcohol Score = 2    HEALTH MAINTENANCE:  Cholesterol: (No results found for: \"CHOL\"   Last Mammo: Not applicable, Result: Not applicable, Next Mammo: Due at age 39   Pap:   Lab Results   Component Value Date    GYNINTERP  10/12/2021     Negative for Intraepithelial Lesion or Malignancy (NILM)    PAP NIL 2018    PAP NIL 10/15/2015   10/12/2021 WNL HPV (-)neg  Colonoscopy:  NA, Result: Not applicable, Next Colonoscopy: NA years.  Dexa:  NA    Health maintenance updated:  yes    HISTORY:  OB History    Para Term  AB Living   1 1 1 0 0 1   SAB IAB Ectopic Multiple Live Births   0 0 0 0 1      # Outcome Date GA Lbr " "Nato/2nd Weight Sex Delivery Anes PTL Lv   1 Term 09/01/21 37w0d 02:31 / 00:15 2.25 kg (4 lb 15.4 oz) M Vag-Spont Nitrous N AISHA      Complications: Preeclampsia/Hypertension      Name: Sarita      Apgar1: 8  Apgar5: 9       Patient Active Problem List   Diagnosis    Uses oral contraception     Past Surgical History:   Procedure Laterality Date    NO HISTORY OF SURGERY        Social History     Tobacco Use    Smoking status: Never    Smokeless tobacco: Never   Substance Use Topics    Alcohol use: Not Currently     Alcohol/week: 0.0 standard drinks of alcohol     Comment: Socially, maybe once a month      Problem (# of Occurrences) Relation (Name,Age of Onset)    Thyroid Disease (1) Mother (59): Thyroid removed     Hyperlipidemia (1) Father    No Known Problems (7) Maternal Grandmother, Maternal Grandfather, Paternal Grandmother, Paternal Grandfather, Sister, Brother, Other              Current Outpatient Medications   Medication Sig    acetaminophen (TYLENOL) 325 MG tablet Take 2 tablets (650 mg) by mouth every 4 hours as needed for mild pain or fever (greater than or equal to 38  C /100.4  F (oral) or 38.5  C/ 101.4  F (core).)    norethindrone (MICRONOR) 0.35 MG tablet Take 1 tablet (0.35 mg) by mouth daily     No current facility-administered medications for this visit.     Allergies   Allergen Reactions    Keflex [Cephalosporins] Hives       Past medical, surgical, social and family histories were reviewed and updated in EPIC.    ROS:   12 point review of systems negative other than symptoms noted below or in the HPI.  No urinary frequency or dysuria, bladder or kidney problems, Normal menstrual cycles    EXAM:  /76   Ht 1.575 m (5' 2\")   Wt 70.3 kg (155 lb)   LMP 01/30/2024 (Approximate)   BMI 28.35 kg/m     BMI: Body mass index is 28.35 kg/m .    PHYSICAL EXAM:  Constitutional:   Appearance: Well nourished, well developed, alert, in no acute distress  Neck:  Lymph Nodes:  No lymphadenopathy " present    Thyroid:  Gland size normal, nontender, no nodules or masses present  on palpation  Chest:  Respiratory Effort:  Breathing unlabored  Cardiovascular:    Heart: Auscultation:  Regular rate, normal rhythm, no murmurs present  Breasts: Palpation of Breasts and Axillae:  No masses present on palpation, no breast tenderness., Axillary Lymph Nodes:  No lymphadenopathy present., and No nodularity, asymmetry or nipple discharge bilaterally.  Gastrointestinal:   Abdominal Examination:  Abdomen nontender to palpation, tone normal without rigidity or guarding, no masses present, umbilicus without lesions   Liver and Spleen:  No hepatomegaly present, liver nontender to palpation    Hernias:  No hernias present  Lymphatic: Lymph Nodes:  No other lymphadenopathy present  Skin:  General Inspection:  No rashes present, no lesions present, no areas of  discoloration  Neurologic:    Mental Status:  Oriented X3.  Normal strength and tone, sensory exam                grossly normal, mentation intact and speech normal.    Psychiatric:   Mentation appears normal and affect normal/bright.         Pelvic Exam:  External Genitalia:     Normal appearance for age, no discharge present, no tenderness present, no inflammatory lesions present, color normal  Vagina:     Normal vaginal vault without central or paravaginal defects, no discharge present, no inflammatory lesions present, no masses present  Bladder:     Nontender to palpation  Urethra:   Urethral Body:  Urethra palpation normal, urethra structural support normal   Urethral Meatus:  No erythema or lesions present  Cervix:     Appearance healthy, no lesions present, nontender to palpation, no bleeding present  Uterus:     Uterus: firm, normal sized and nontender, anteverted in position.   Adnexa:     No adnexal tenderness present, no adnexal masses present  Perineum:     Perineum within normal limits, no evidence of trauma, no rashes or skin lesions present  Anus:     Anus  within normal limits, no hemorrhoids present  Inguinal Lymph Nodes:     No lymphadenopathy present  Pubic Hair:     Normal pubic hair distribution for age  Genitalia and Groin:     No rashes present, no lesions present, no areas of discoloration, no masses present    COUNSELING:   Reviewed preventive health counseling, as reflected in patient instructions  Special attention given to:        Regular exercise       Healthy diet/nutrition       Contraception       Family planning       Folic Acid    BMI: Body mass index is 28.35 kg/m .  Weight management plan: Discussed healthy diet and exercise guidelines Patient was referred to their PCP to discuss a diet and exercise plan.    ASSESSMENT:  33 year old female with satisfactory annual exam.    ICD-10-CM    1. Encounter for gynecological examination without abnormal finding  Z01.419       2. OCP (oral contraceptive pills) initiation  Z30.011 norethindrone (MICRONOR) 0.35 MG tablet          PLAN:  Patient Instructions   Follow up with your primary care provider for your other medical problems.  Continue self breast exam.  Increase physical activity and exercise.  Usual safety and preventative measures counseling done.  BMI >25  Weight loss encouraged.  Last pap smear (2021) was normal and negative for the DNA of high risk HPV subtypes.  No pap was obtained this year.  This was discussed with the patient and she agrees with the plan.       Jes Foley MD

## 2024-02-06 ENCOUNTER — OFFICE VISIT (OUTPATIENT)
Dept: OBGYN | Facility: CLINIC | Age: 34
End: 2024-02-06
Payer: COMMERCIAL

## 2024-02-06 VITALS
DIASTOLIC BLOOD PRESSURE: 76 MMHG | WEIGHT: 155 LBS | BODY MASS INDEX: 28.52 KG/M2 | SYSTOLIC BLOOD PRESSURE: 120 MMHG | HEIGHT: 62 IN

## 2024-02-06 DIAGNOSIS — Z30.011 OCP (ORAL CONTRACEPTIVE PILLS) INITIATION: ICD-10-CM

## 2024-02-06 DIAGNOSIS — Z01.419 ENCOUNTER FOR GYNECOLOGICAL EXAMINATION WITHOUT ABNORMAL FINDING: Primary | ICD-10-CM

## 2024-02-06 PROCEDURE — 99395 PREV VISIT EST AGE 18-39: CPT | Performed by: OBSTETRICS & GYNECOLOGY

## 2024-02-06 RX ORDER — ACETAMINOPHEN AND CODEINE PHOSPHATE 120; 12 MG/5ML; MG/5ML
0.35 SOLUTION ORAL DAILY
Qty: 84 TABLET | Refills: 4 | Status: SHIPPED | OUTPATIENT
Start: 2024-02-06

## 2024-02-06 ASSESSMENT — ANXIETY QUESTIONNAIRES
2. NOT BEING ABLE TO STOP OR CONTROL WORRYING: NOT AT ALL
7. FEELING AFRAID AS IF SOMETHING AWFUL MIGHT HAPPEN: NOT AT ALL
GAD7 TOTAL SCORE: 0
6. BECOMING EASILY ANNOYED OR IRRITABLE: NOT AT ALL
5. BEING SO RESTLESS THAT IT IS HARD TO SIT STILL: NOT AT ALL
GAD7 TOTAL SCORE: 0
3. WORRYING TOO MUCH ABOUT DIFFERENT THINGS: NOT AT ALL
IF YOU CHECKED OFF ANY PROBLEMS ON THIS QUESTIONNAIRE, HOW DIFFICULT HAVE THESE PROBLEMS MADE IT FOR YOU TO DO YOUR WORK, TAKE CARE OF THINGS AT HOME, OR GET ALONG WITH OTHER PEOPLE: NOT DIFFICULT AT ALL
1. FEELING NERVOUS, ANXIOUS, OR ON EDGE: NOT AT ALL

## 2024-02-06 ASSESSMENT — PATIENT HEALTH QUESTIONNAIRE - PHQ9
SUM OF ALL RESPONSES TO PHQ QUESTIONS 1-9: 0
5. POOR APPETITE OR OVEREATING: NOT AT ALL

## 2024-02-06 NOTE — PATIENT INSTRUCTIONS
Follow up with your primary care provider for your other medical problems.  Continue self breast exam.  Increase physical activity and exercise.  Usual safety and preventative measures counseling done.  BMI >25  Weight loss encouraged.  Last pap smear (2021) was normal and negative for the DNA of high risk HPV subtypes.  No pap was obtained this year.  This was discussed with the patient and she agrees with the plan.

## 2024-10-17 ENCOUNTER — VIRTUAL VISIT (OUTPATIENT)
Dept: OBGYN | Facility: CLINIC | Age: 34
End: 2024-10-17
Payer: COMMERCIAL

## 2024-10-17 DIAGNOSIS — O36.80X0 PREGNANCY WITH INCONCLUSIVE FETAL VIABILITY: ICD-10-CM

## 2024-10-17 DIAGNOSIS — O09.529 SUPERVISION OF HIGH-RISK PREGNANCY OF ELDERLY MULTIGRAVIDA: Primary | ICD-10-CM

## 2024-10-17 DIAGNOSIS — Z13.79 GENETIC SCREENING: ICD-10-CM

## 2024-10-17 PROCEDURE — 99207 PR NO CHARGE NURSE ONLY: CPT | Mod: 93

## 2024-10-17 RX ORDER — VITAMIN A ACETATE, .BETA.-CAROTENE, ASCORBIC ACID, CHOLECALCIFEROL, .ALPHA.-TOCOPHEROL ACETATE, DL-, THIAMINE MONONITRATE, RIBOFLAVIN, NIACINAMIDE, PYRIDOXINE HYDROCHLORIDE, FOLIC ACID, CYANOCOBALAMIN, CALCIUM CARBONATE, FERROUS FUMARATE, ZINC OXIDE, AND CUPRIC OXIDE 2000; 2000; 120; 400; 22; 1.84; 3; 20; 10; 1; 12; 200; 27; 25; 2 [IU]/1; [IU]/1; MG/1; [IU]/1; MG/1; MG/1; MG/1; MG/1; MG/1; MG/1; UG/1; MG/1; MG/1; MG/1; MG/1
1 TABLET ORAL DAILY
COMMUNITY

## 2024-10-17 NOTE — PATIENT INSTRUCTIONS
Learning About Pregnancy  Your Care Instructions     Your health in the early weeks of your pregnancy is particularly important for your baby's health. Take good care of yourself. Anything you do that harms your body can also harm your baby.  Make sure to go to all of your doctor appointments. Regular checkups will help keep you and your baby healthy.  How can you care for yourself at home?  Diet    Choose healthy foods like fruits, vegetables, whole grains, lean proteins, and healthy fats.     Choose foods that are good sources of calcium, iron, and folate. You can try dairy products, dark leafy greens, fortified orange juice and cereals, almonds, broccoli, dried fruit, and beans.     Do not skip meals or go for many hours without eating. If you are nauseated, try to eat a small, healthy snack every 2 to 3 hours.     Avoid fish that are high in mercury. These include shark, swordfish, clinton mackerel, marlin, orange roughy, and bigeye tuna, as well as tilefish from the [facility].     It's okay to eat up to 8 to 12 ounces a week of fish that are low in mercury or up to 4 ounces a week of fish that have medium levels of mercury. Some fish that are low in mercury are salmon, shrimp, canned light tuna, cod, and tilapia. Some fish that have medium levels of mercury are halibut and white albacore tuna.     Drink plenty of fluids. If you have kidney, heart, or liver disease and have to limit fluids, talk with your doctor before you increase the amount of fluids you drink.     Limit caffeine to about 200 to 300 mg per day. On average, a cup of brewed coffee has around 80 to 100 mg of caffeine.     Do not drink alcohol, such as beer, wine, or hard liquor.     Take a multivitamin that contains at least 400 micrograms (mcg) of folic acid to help prevent birth defects. Fortified cereal and whole wheat bread are good additional sources of folic acid.     Increase the calcium in your diet. Try to drink a quart of skim milk  each day. You may also take calcium supplements and choose foods such as cheese and yogurt.   Lifestyle    Make sure you go to your follow-up appointments.     Get plenty of rest. You may be unusually tired while you are pregnant.     Get at least 30 minutes of exercise on most days of the week. Walking is a good choice. If you have not exercised in the past, start out slowly. Take several short walks each day.     Do not smoke. If you need help quitting, talk to your doctor about stop-smoking programs. These can increase your chances of quitting for good.     Do not touch cat feces or litter boxes. Also, wash your hands after you handle raw meat, and fully cook all meat before you eat it. Wear gloves when you work in the yard or garden, and wash your hands well when you are done. Cat feces, raw or undercooked meat, and contaminated dirt can cause an infection that may harm your baby or lead to a miscarriage.     Avoid things that can make your body too hot and may be harmful to your baby, such as a hot tub or sauna. Or talk with your doctor before doing anything that raises your body temperature. Your doctor can tell you if it's safe.     Avoid chemical fumes, paint fumes, or poisons.     Do not use illegal drugs, marijuana, or alcohol.   Medicines    Review all of your medicines with your doctor. Some of your routine medicines may need to be changed to protect your baby.     Use acetaminophen (Tylenol) to relieve minor problems, such as a mild headache or backache or a mild fever with cold symptoms. Do not use nonsteroidal anti-inflammatory drugs (NSAIDs), such as ibuprofen (Advil, Motrin) or naproxen (Aleve), unless your doctor says it is okay.     Do not take two or more pain medicines at the same time unless the doctor told you to. Many pain medicines have acetaminophen, which is Tylenol. Too much acetaminophen (Tylenol) can be harmful.     Take your medicines exactly as prescribed. Call your doctor if you  "think you are having a problem with your medicine.   To manage morning sickness    Keep food in your stomach, but not too much at once. Try eating five or six small meals a day instead of three large meals.     For nausea when you wake up, eat a small snack, such as a couple of crackers or pretzels, before rising. Allow a few minutes for your stomach to settle before you slowly get up.     Try to avoid smells and foods that make you feel nauseated. High-fat or greasy foods, milk, and coffee may make nausea worse. Some foods that may be easier to tolerate include cold, spicy, sour, and salty foods.     Drink enough fluids. Water and other caffeine-free drinks are good choices.     Take your prenatal vitamins at night on a full stomach.     Try foods and drinks made with corrie. Corrie may help with nausea.     Get lots of rest. Morning sickness may be worse when you are tired.     Talk to your doctor about over-the-counter products, such as vitamin B6 or doxylamine, to help relieve symptoms.     Try a P6 acupressure wrist band. These anti-nausea wristbands help some people.   Follow-up care is a key part of your treatment and safety. Be sure to make and go to all appointments, and call your doctor if you are having problems. It's also a good idea to know your test results and keep a list of the medicines you take.  Where can you learn more?  Go to https://www.Tradeshift.net/patiented  Enter E868 in the search box to learn more about \"Learning About Pregnancy.\"  Current as of: July 10, 2023  Content Version: 14.2 2024 [facility] [employer].   Care instructions adapted under license by your healthcare professional. If you have questions about a medical condition or this instruction, always ask your healthcare professional. Healthwise, Incorporated disclaims any warranty or liability for your use of this information.    Weeks 6 to 10 of Your Pregnancy: Care Instructions  During these weeks of pregnancy, your body " "goes through many changes. You may start to feel different, both in your body and your emotions. Each pregnancy is different, so there's no \"right\" way to feel. These early weeks are a time to make healthy choices for you and your pregnancy.    Take a daily prenatal vitamin. Choose one with folic acid in it.   Avoid alcohol, tobacco, and drugs (including marijuana). If you need help quitting, talk to your doctor.     Drink plenty of liquids.  Be sure to drink enough water. And limit sodas, other sweetened drinks, and caffeine.     Choose foods that are good sources of calcium, iron, and folate.  You can try dairy products, dark leafy greens, fortified orange juice and cereals, almonds, broccoli, dried fruit, and beans.     Avoid foods that may be harmful.  Don't eat raw meat, deli meat, raw seafood, or raw eggs. Avoid soft cheese and unpasteurized dairy, like Brie and blue cheese. And don't eat fish that contains a lot of mercury, like shark and swordfish.     Don't touch fernandez litter or cat poop.  They can cause an infection that could be harmful during pregnancy.     Avoid things that can make your body too hot.  For example, avoid hot tubs and saunas.     Soothe morning sickness.  Try eating 5 or 6 small meals a day, getting some fresh air, or using lory to control symptoms.     Ask your doctor about flu and COVID-19 shots.  Getting them can help protect against infection.   Follow-up care is a key part of your treatment and safety. Be sure to make and go to all appointments, and call your doctor if you are having problems. It's also a good idea to know your test results and keep a list of the medicines you take.  Where can you learn more?  Go to https://www.Promoter.io.net/patiented  Enter G112 in the search box to learn more about \"Weeks 6 to 10 of Your Pregnancy: Care Instructions.\"  Current as of: July 10, 2023  Content Version: 14.2 2024 [facility] [employer].   Care instructions adapted under license " by your healthcare professional. If you have questions about a medical condition or this instruction, always ask your healthcare professional. [employer] disclaims any warranty or liability for your use of this information.       Pregnancy: Managing Morning Sickness (01:48)  Your health professional recommends that you watch this short online health video.  Learn how to manage morning sickness during pregnancy.   Purpose: Learn how to manage morning sickness during pregnancy.  Goal: Learn how to manage morning sickness during pregnancy.    Watch: Scan the QR code or visit the link to view video       [email or web address]  Current as of: July 10, 2023  Content Version: 14.2 2024 [employer].   Care instructions adapted under license by your healthcare professional. If you have questions about a medical condition or this instruction, always ask your healthcare professional. [employer] disclaims any warranty or liability for your use of this information.    Pregnancy and Heartburn: Care Instructions  Overview     Heartburn is a common problem during pregnancy.  Heartburn happens when stomach acid backs up into the tube that carries food to the stomach. This tube is called the esophagus. Early in pregnancy, heartburn is caused by hormone changes that slow down digestion. Later on, it's also caused by the large uterus pushing up on the stomach.  Even though you can't fix the cause, there are things you can do to get relief. Treating heartburn during pregnancy focuses first on making lifestyle changes, like changing what and how you eat, and on taking medicines.  Heartburn usually improves or goes away after childbirth.  Follow-up care is a key part of your treatment and safety. Be sure to make and go to all appointments, and call your doctor if you are having problems. It's also a good idea to know your test results and keep a list of the medicines you take.  How can  "you care for yourself at home?  Eat small, frequent meals.  Avoid foods that make your symptoms worse, such as chocolate, peppermint, and spicy foods. Avoid drinks with caffeine, such as coffee, tea, and sodas.  Avoid bending over or lying down after meals.  Take a short walk after you eat.  If heartburn is a problem at night, do not eat for 2 hours before bedtime.  Take antacids like Mylanta, Maalox, Rolaids, or Tums. Do not take antacids that have sodium bicarbonate, magnesium trisilicate, or aspirin. Be careful when you take over-the-counter antacid medicines. Many of these medicines have aspirin in them. While you are pregnant, do not take aspirin or medicines that contain aspirin unless your doctor says it is okay.  If you're not getting relief, talk to your doctor. You may be able to take a stronger acid-reducing medicine.  When should you call for help?   Call your doctor now or seek immediate medical care if:    You have new or worse belly pain.     You are vomiting.   Watch closely for changes in your health, and be sure to contact your doctor if:    You have new or worse symptoms of reflux.     You are losing weight.     You have trouble or pain swallowing.     You do not get better as expected.   Where can you learn more?  Go to https://www.[employer].net/patiented  Enter U946 in the search box to learn more about \"Pregnancy and Heartburn: Care Instructions.\"  Current as of: July 10, 2023  Content Version: 14.2 2024 [facility] [employer].   Care instructions adapted under license by your healthcare professional. If you have questions about a medical condition or this instruction, always ask your healthcare professional. Healthwise, Incorporated disclaims any warranty or liability for your use of this information.    Constipation: Care Instructions  Overview     Constipation means that you have a hard time passing stools (bowel movements). People pass stools from 3 times a day to once every 3 days. " What is normal for you may be different. Constipation may occur with pain in the rectum and cramping. The pain may get worse when you try to pass stools. Sometimes there are small amounts of bright red blood on toilet paper or the surface of stools. This is because of enlarged veins near the rectum (hemorrhoids).  A few changes in your diet and lifestyle may help you avoid ongoing constipation. Your doctor may also prescribe medicine to help loosen your stool.  Some medicines can cause constipation. These include pain medicines and antidepressants. Tell your doctor about all the medicines you take. Your doctor may want to make a medicine change to ease your symptoms.  Follow-up care is a key part of your treatment and safety. Be sure to make and go to all appointments, and call your doctor if you are having problems. It's also a good idea to know your test results and keep a list of the medicines you take.  How can you care for yourself at home?  Drink plenty of fluids. If you have kidney, heart, or liver disease and have to limit fluids, talk with your doctor before you increase the amount of fluids you drink.  Include high-fiber foods in your diet each day. These include fruits, vegetables, beans, and whole grains.  Get at least 30 minutes of exercise on most days of the week. Walking is a good choice. You also may want to do other activities, such as running, swimming, cycling, or playing tennis or team sports.  Take a fiber supplement, such as Citrucel or Metamucil, every day. Read and follow all instructions on the label.  Schedule time each day for a bowel movement. A daily routine may help. Take your time having a bowel movement, but don't sit for more than 10 minutes at a time. And don't strain too much.  Support your feet with a small step stool when you sit on the toilet. This helps flex your hips and places your pelvis in a squatting position.  Your doctor may recommend an over-the-counter laxative to  "relieve your constipation. Examples are Milk of Magnesia and MiraLax. Read and follow all instructions on the label. Do not use laxatives on a long-term basis.  When should you call for help?   Call your doctor now or seek immediate medical care if:    You have new or worse belly pain.     You have new or worse nausea or vomiting.     You have blood in your stools.   Watch closely for changes in your health, and be sure to contact your doctor if:    Your constipation is getting worse.     You do not get better as expected.   Where can you learn more?  Go to https://www.[employer].net/patiented  Enter P343 in the search box to learn more about \"Constipation: Care Instructions.\"  Current as of: October 19, 2023  Content Version: 14.2 2024 [employer].   Care instructions adapted under license by your healthcare professional. If you have questions about a medical condition or this instruction, always ask your healthcare professional. Healthwise, Incorporated disclaims any warranty or liability for your use of this information.    Learning About High-Iron Foods  What foods are high in iron?     The foods you eat contain nutrients, such as vitamins and minerals. Iron is a nutrient. Your body needs the right amount to stay healthy and work as it should. You can use the list below to help you make choices about which foods to eat.  Here are some foods that contain iron. They have 1 to 2 milligrams of iron per serving.  Fruits  Figs (dried), 5 figs  Vegetables  Asparagus (canned), 6 sosa  Zana, beet, Swiss chard, or turnip greens, 1 cup  Dried peas, cooked,   cup  Seaweed, spirulina (dried),   cup  Spinach, (cooked)   cup or (raw) 1 cup  Grains  Cereals, fortified with iron, 1 cup  Grits (instant, cooked), fortified with iron,   cup  Meats and other protein foods  Beans (kidney, lima, navy, white), canned or cooked,   cup  Beef or lamb, 3 oz  Chicken giblets, 3 oz  Chickpeas (garbanzo beans),   cup  Liver " "of beef, lamb, or pork, 3 oz  Oysters (cooked), 3 oz  Sardines (canned), 3 oz  Soybeans (boiled),   cup  Tofu (firm),   cup  Work with your doctor to find out how much of this nutrient you need. Depending on your health, you may need more or less of it in your diet.  Where can you learn more?  Go to https://www.[employer].net/patiented  Enter R005 in the search box to learn more about \"Learning About High-Iron Foods.\"  Current as of: September 20, 2023  Content Version: 14.2 2024 [employer].   Care instructions adapted under license by your healthcare professional. If you have questions about a medical condition or this instruction, always ask your healthcare professional. Healthwise, Incorporated disclaims any warranty or liability for your use of this information.    Rubella (Swedish Measles): Care Instructions  Overview  Rubella, also called Swedish measles or 3-day measles, is a disease caused by a virus. It spreads by coughs, sneezes, and close contact. Rubella usually is mild and does not cause long-term problems. But if you are pregnant and get it, you can give the disease to your unborn baby. This can cause serious birth defects.  While you have rubella, you may get a rash and a mild fever, and the lymph glands in your neck may swell. Older children often have a fever, eye pain, a sore throat, and body aches. You can relieve most symptoms with care at home. Avoid being around others, especially pregnant people, until your rash has been gone for at least 4 days. People who have not had this disease before or have not had the vaccine have the greatest chance of getting the virus.  Follow-up care is a key part of your treatment and safety. Be sure to make and go to all appointments, and call your doctor if you are having problems. It's also a good idea to know your test results and keep a list of the medicines you take.  How can you care for yourself at home?  Drink plenty of fluids. If you have " "kidney, heart, or liver disease and have to limit fluids, talk with your doctor before you increase the amount of fluids you drink.  Get plenty of rest to help your body heal.  Take an over-the-counter pain medicine, such as acetaminophen (Tylenol), ibuprofen (Advil, Motrin), or naproxen (Aleve), to reduce fever and discomfort. Read and follow all instructions on the label. Do not give aspirin to anyone younger than 20. It has been linked to Reye syndrome, a serious illness.  Do not take two or more pain medicines at the same time unless the doctor told you to. Many pain medicines have acetaminophen, which is Tylenol. Too much acetaminophen (Tylenol) can be harmful.  Try not to scratch the rash. Put cold, wet cloths on the rash to reduce itching.  Do not smoke. Smoking can make your symptoms worse. If you need help quitting, talk to your doctor about stop-smoking programs and medicines. These can increase your chances of quitting for good.  Avoid contact with people who have never had rubella and who have not been immunized.  When should you call for help?   Call your doctor now or seek immediate medical care if:    You have a fever with a stiff neck or a severe headache.     You are sensitive to light or feel very sleepy or confused.   Watch closely for changes in your health, and be sure to contact your doctor if:    You do not get better as expected.   Where can you learn more?  Go to https://www.Cinemacraft.net/patiented  Enter B812 in the search box to learn more about \"Rubella (Russian Measles): Care Instructions.\"  Current as of: June 12, 2023  Content Version: 14.2 2024 [employer].   Care instructions adapted under license by your healthcare professional. If you have questions about a medical condition or this instruction, always ask your healthcare professional. Healthwise, Incorporated disclaims any warranty or liability for your use of this information.    Gonorrhea and Chlamydia: About These " Tests  What is it?  These tests use a sample of urine or other body fluid to look for the bacteria that cause these sexually transmitted infections (STIs). The fluid sample can come from the cervix, vagina, rectum, throat, or eyes.  Why is this test done?  These tests may be done to:  Find out if symptoms are caused by gonorrhea or chlamydia.  Check people who are at high risk of being infected with gonorrhea or chlamydia.  Retest people several months after they have been treated for gonorrhea or chlamydia.  Check for infection in your  if you had a gonorrhea or chlamydia infection at the time of delivery.  How can you prepare for the test?  If you are going to have a urine test, do not urinate for at least 1 hour before the test.  If you think you may have chlamydia or gonorrhea, don't have sexual intercourse until you get your test results. And you may want to have tests for other STIs, such as HIV.  How is the test done?  For a direct sample, a swab is used to collect body fluid from the cervix, vagina, rectum, throat, or eyes. Your doctor may collect the sample. Or you may be given instructions on how to collect your own sample.  For a urine sample, you will collect the urine that comes out when you first start to urinate. Don't wipe the genital area clean before you urinate.  How long does the test take?  The test will take a few minutes.  What happens after the test?  You will be able to go home right away.  You can go back to your usual activities right away.  If you do have an infection, don't have sexual intercourse for 7 days after you start treatment. And your sex partner(s) should also be treated.  Follow-up care is a key part of your treatment and safety. Be sure to make and go to all appointments, and call your doctor if you are having problems. It's also a good idea to keep a list of the medicines you take. Ask your doctor when you can expect to have your test results.  Where can you learn  "more?  Go to https://www.Happigo.com.net/patiented  Enter K976 in the search box to learn more about \"Gonorrhea and Chlamydia: About These Tests.\"  Current as of: November 27, 2023  Content Version: 14.2 2024 [employer].   Care instructions adapted under license by your healthcare professional. If you have questions about a medical condition or this instruction, always ask your healthcare professional. Healthwise, Incorporated disclaims any warranty or liability for your use of this information.    Trichomoniasis: About This Test  What is it?     This test uses a sample of urine or other body fluid to look for the tiny parasite that causes trichomoniasis (also called trich). The fluid sample can come from the vagina, cervix, or urethra. Your doctor may choose to use one or more of many available tests.  Why is it done?  A trich test may be done to:  Find out if symptoms are caused by trich.  Check people who are at high risk for being infected with trich.  Check after treatment to make sure that the infection is gone.  How do you prepare for the test?  If you are going to have a urine test, do not urinate for at least 1 hour before the test.  How is the test done?  For a direct sample, a swab is used to collect body fluid from the cervix, vagina, or urethra. Your doctor may collect the sample. Or you may be given instructions on how to collect your own sample.  For a urine sample, you will collect the urine that comes out when you first start to urinate. Don't wipe the area clean before you urinate.  How long does the test take?  It will take a few minutes to collect a sample.  What happens after the test?  You can go home right away.  You can go back to your usual activities right away.  You may get the test results the same day or several days later. It depends on the test used.  If you do have an infection, don't have sexual intercourse for 7 days after you start treatment. Your sex partner or " partners should also be treated.  Follow-up care is a key part of your treatment and safety. Be sure to make and go to all appointments, and call your doctor if you are having problems. Ask your doctor when you can expect to have your test results.  Current as of: November 27, 2023  Content Version: 14.2    2024 [employer].   Care instructions adapted under license by your healthcare professional. If you have questions about a medical condition or this instruction, always ask your healthcare professional. Healthwise, Incorporated disclaims any warranty or liability for your use of this information.    HIV Testing: Care Instructions  Overview  You can get tested for the human immunodeficiency virus (HIV). Most doctors use a blood test to check for HIV antibodies and antigens in your blood. It may also check for the genetic material (RNA) of HIV. Some tests use saliva to check for HIV antibodies. But these aren't as accurate. For example, they may give a false result if you've just been infected.  What do the results mean?        Normal (negative)   No HIV antibodies, antigens, or RNA were found.  You may need more testing. It can make sure your test results are correct.        Uncertain (indeterminate)   Test results didn't clearly show if you have an HIV infection.  HIV antibodies or antigens may not have formed yet.  Some other type of antibody or antigen may have affected the results.  You will need another test to be sure.        Abnormal (positive)   HIV antibodies, antigens, or RNA were found.  If you haven't had an RNA test yet, one will be done. If it's positive, you have HIV.  If your test result is positive, your doctor will talk to you. You will discuss starting treatment.  Follow-up care is a key part of your treatment and safety. Be sure to make and go to all appointments, and call your doctor if you are having problems. It's also a good idea to know your test results and keep a list of the  "medicines you take.  Where can you learn more?  Go to https://www.DSTLD.net/patiented  Enter T792 in the search box to learn more about \"HIV Testing: Care Instructions.\"  Current as of: June 12, 2023  Content Version: 14.2 2024 [employer].   Care instructions adapted under license by your healthcare professional. If you have questions about a medical condition or this instruction, always ask your healthcare professional. [employer] disclaims any warranty or liability for your use of this information.    Hepatitis C Virus Tests: About These Tests  What are they?     Hepatitis C virus tests are blood tests that check for substances in the blood that show whether you have hepatitis C now or had it in the past. The tests can also tell you what type of hepatitis C you have and how severe the disease is. This can help your doctor with treatment.  If the tests show that you have long-term hepatitis C, you need to take steps to prevent spreading the disease.  Why are these tests done?  You may need these tests if:  You have symptoms of hepatitis.  You may have been exposed to the virus. You are more likely to have been exposed to the virus if you inject drugs or are exposed to body fluids (such as if you are a health care worker).  You've had other tests that show you have liver problems.  You are 18 to 79 years old.  You have an HIV infection.  The tests also are done to help your doctor decide about your treatment and see how well it works.  How do you prepare for the test?  In general, there's nothing you have to do before this test, unless your doctor tells you to.  How is the test done?  A health professional uses a needle to take a blood sample, usually from the arm.  What happens after these tests?  You will probably be able to go home right away.  You can go back to your usual activities right away.  Follow-up care is a key part of your treatment and safety. Be sure to make and go " "to all appointments, and call your doctor if you are having problems. It's also a good idea to keep a list of the medicines you take. Ask your doctor when you can expect to have your test results.  Where can you learn more?  Go to https://www.LeanKit.net/patiented  Enter W551 in the search box to learn more about \"Hepatitis C Virus Tests: About These Tests.\"  Current as of: June 12, 2023  Content Version: 14.2 2024 [employer].   Care instructions adapted under license by your healthcare professional. If you have questions about a medical condition or this instruction, always ask your healthcare professional. Health[employer], Incorporated disclaims any warranty or liability for your use of this information.    Learning About Fetal Ultrasound Results  What is a fetal ultrasound?     Fetal ultrasound is a test that lets your doctor see an image of your baby. Your doctor learns information about your baby from this picture. You may find out, for example, if you are having a boy or a girl. But the main reason you have this test is to get information about your baby's health.  (You may hear your baby called a fetus. This is a common medical term for a baby that's growing in the mother's uterus.)  What kind of information can you learn from this test?  The findings of an ultrasound fall into two categories, normal and abnormal.  Normal  The fetus is the right size for its age.  The placenta is the expected size and does not cover the cervix.  There is enough amniotic fluid in the uterus.  No birth defects can be seen.  Abnormal  The fetus is small or large for its age.  The placenta covers the cervix.  There is too much or too little amniotic fluid in the uterus.  The fetus may have a birth defect.  What does an abnormal result mean?  Abnormal seems to imply that something is wrong with your baby. But what it means is that the test has shown something the doctor wants to take a closer look at.  And that's what " happens next. Your doctor will talk to you about what further test or tests you may need.  What do the results mean?  Some of the things your doctor may see on an abnormal ultrasound include:  Echogenic bowel.  The bowel looks very bright on the screen. This could mean that there's blood in the bowel. Or it could mean that something is blocking the small bowel.  Increased nuchal translucency.  The ultrasound measures the thickness at the back of the baby's neck. An increase in thickness is sometimes an early sign of Down syndrome.  Increased or decreased amniotic fluid.  The doctor will look for a reason for the level of amniotic fluid and will watch the pregnancy closely as it progresses.  Large ventricles.  Ventricles in the brain look larger than they should. Your doctor may take a closer look at the brain.  Renal pyelectasis/hydronephrosis.  The ultrasound measures the fluid around the kidney. If there is more fluid than expected, there is a chance of urinary tract or kidney problems.  Short long bones.  The ultrasound measures certain arm and leg bones. A long bone (humerus or femur) that is shorter than average could be a sign of Down syndrome.  Subchorionic hemorrhage.  An ultrasound can show bleeding under one of the membranes that surrounds the fetus. Some women don't have symptoms of bleeding. The ultrasound can find this problem when women are not bleeding from their vagina. Women who have this condition have a slightly higher chance of miscarriage.  What do you do now?  Take a deep breath, and let it out. Keep in mind that an abnormal finding on an ultrasound, after it's coupled with more information, may:  Turn out to be nothing.  Turn out to be something mild that won't affect the baby.  Turn out to be something more serious. But if this happens, early diagnosis helps you and your doctor plan treatment options sooner rather than later.  Your medical team is there for you. So are your family and  "friends. Ask questions, and get the help and support you need.  Follow-up care is a key part of your treatment and safety. Be sure to make and go to all appointments, and call your doctor if you are having problems. It's also a good idea to know your test results and keep a list of the medicines you take.  Where can you learn more?  Go to https://www.[employer].net/patiented  Enter K451 in the search box to learn more about \"Learning About Fetal Ultrasound Results.\"  Current as of: July 10, 2023  Content Version: 14.2 2024 [employer]).   Care instructions adapted under license by your healthcare professional. If you have questions about a medical condition or this instruction, always ask your healthcare professional. Healthwise, Incorporated disclaims any warranty or liability for your use of this information.    Learning About Prenatal Visits  Overview     Regular prenatal visits are very important during any pregnancy. These quick office visits may seem simple and routine. But they can help you have a safe and healthy pregnancy. Your doctor is watching for problems that can only be found through regular checkups. The visits also give you and your doctor time to build a good relationship.  After your first visit, you will most likely start on a schedule of monthly visits. In your third trimester, the visits will get more frequent. Based on your health, your age, and if you've had a normal, full-term pregnancy before, your doctor may want to see you more or less often.  At different times in your pregnancy, you will have exams and tests. Some are routine. Others are done only when there is a chance of a problem. Everything healthy you do for your body helps you have a healthy pregnancy. Rest when you need it. Eat well, drink plenty of water, and exercise regularly.  What happens during a prenatal visit?  You will have blood pressure checks, along with urine tests. You also may have blood tests. If you " need to go to the bathroom while waiting for the doctor, tell the nurse. You will be given a sample cup so your urine can be tested.  You will be weighed and have your belly measured.  Your doctor may listen to the fetal heartbeat with a special device.  At about 24 weeks, and possibly earlier in your pregnancy, your doctor will check your blood sugar (glucose tolerance test) for diabetes that can occur during pregnancy. This is gestational diabetes, which can be harmful.  You will have tests to check for infections that could harm your . These include group B streptococcus and hepatitis B.  Your doctor may do ultrasounds to check for problems. This also checks the position of the fetus. An ultrasound uses sound waves to produce a picture of the fetus.  You may get your vaccines updated.  Your doctor may ask you questions to check for signs of anxiety or depression. Tell your doctor if you feel sad, anxious, or hopeless for more than a few days.  You may have other tests at any time during your pregnancy.  Use your visits to discuss with your doctor any concerns you have.  How can you care for yourself at home?  Get plenty of rest.  Try to exercise every day, if your doctor says it is okay. If you have not exercised in the past, start out slowly. For example, you can take short walks each day.  Choose healthy foods, such as fruits, vegetables, whole grains, lean proteins, low-fat dairy, and healthy fats.  Drink plenty of fluids. Cut down on drinks with caffeine, such as coffee, tea, and cola. If you have kidney, heart, or liver disease and have to limit fluids, talk with your doctor before you increase the amount of fluids you drink.  Try to avoid chemical fumes, paint fumes, and poisons.  If you smoke, vape, or use alcohol, marijuana, or other drugs, quit or cut back as much as you can. Talk to your doctor if you need help quitting.  Review all of your medicines, including over-the-counter medicines and  "supplements, with your doctor. Some of your routine medicines may need to be changed. Do not stop or start taking any medicines without talking to your doctor first.  Follow-up care is a key part of your treatment and safety. Be sure to make and go to all appointments, and call your doctor if you are having problems. It's also a good idea to know your test results and keep a list of the medicines you take.  Where can you learn more?  Go to https://www.[employer].net/patiented  Enter J502 in the search box to learn more about \"Learning About Prenatal Visits.\"  Current as of: July 10, 2023  Content Version: 14.2 2024 [employer]D.   Care instructions adapted under license by your healthcare professional. If you have questions about a medical condition or this instruction, always ask your healthcare professional. Healthwise, Incorporated disclaims any warranty or liability for your use of this information.    Intimate Partner Violence: Care Instructions  Overview     If you want to save this information but don't think it is safe to take it home, see if a trusted friend can keep it for you. Plan ahead. Know who you can call for help, and memorize the phone number.   Be careful online too. Your online activity may be seen by others. Do not use your personal computer or device to read about this topic. Use a safe computer, such as one at work, a friend's home, or a library.    Intimate partner violence--a type of domestic abuse--is different from an argument now and then. It is a pattern of abuse that one person may use to control another person's behavior. It may start with threats and name-calling. Then, it may lead to more serious acts, like pushing and slapping. The abuse also may occur in other areas. For example, the abuser may withhold money or spend a partner's money without their knowledge.  Abuse can cause serious harm. You are more likely to have a long-term health problem from the injuries and " stress of living in a violent relationship. People who are sexually abused by their partners have more sexually transmitted infections and unplanned pregnancies. Anyone who is abused also faces emotional pain. Anyone can be abused in relationships. In some relationships, both people use abusive behavior.  If you are pregnant, abuse can cause problems such as poor weight gain, infections, and bleeding. Abuse during this time may increase your baby's risk of low birth weight, premature birth, and death.  Follow-up care is a key part of your treatment and safety. Be sure to make and go to all appointments, and call your doctor if you are having problems. It's also a good idea to know your test results and keep a list of the medicines you take.  How can you care for yourself at home?  If you do not have a safe place to stay, discuss this with your doctor before you leave.  Have a plan for where to go, how to leave your home, and where to stay in case of an emergency. Do not tell your partner about your plan. Contact:  The National Domestic Violence Hotline toll-free at 1-701.871.1597. They can help you find resources in your area.  Your local police department, hospital, or clinic for information about shelters and safe homes near you.  Talk to a trusted friend or neighbor, a counselor, or a braden leader. Do not feel that you have to hide what happened.  Teach your children how to call for help in an emergency.  Be alert to warning signs, such as threats, heavy alcohol use, or drug use. This can help you avoid danger.  If you can, make sure that there are no guns or other weapons in your home.  When should you call for help?   Call 911 anytime you think you may need emergency care. For example, call if:    You or someone else has just been abused.     You think you or someone else is in danger of being abused.   Watch closely for changes in your health, and be sure to contact your doctor if you have any problems.  Where  "can you learn more?  Go to https://www.Bloomspot.net/patiented  Enter G282 in the search box to learn more about \"Intimate Partner Violence: Care Instructions.\"  Current as of: June 24, 2023  Content Version: 14.2 2024 [employer].   Care instructions adapted under license by your healthcare professional. If you have questions about a medical condition or this instruction, always ask your healthcare professional. Healthwise, Incorporated disclaims any warranty or liability for your use of this information.    Intimate Partner Violence Safety Instructions: Care Instructions  Overview     If you want to save this information but don't think it is safe to take it home, see if a trusted friend can keep it for you. Plan ahead. Know who you can call for help, and memorize the phone number.   Be careful online too. Your online activity may be seen by others. Do not use your personal computer or device to read about this topic. Use a safe computer, such as one at work, a friend's home, or a library.    When you are abused by a spouse or partner, you can take actions to protect yourself and your children.  You can increase your safety whether you decide to stay with your spouse or partner or you decide to leave. You may want to make a safety plan and pack a bag ahead of time. This will help you leave quickly when you decide to. Remember, you cannot change your partner's actions, but you can find help for you and your children. No one deserves to be abused.  Follow-up care is a key part of your treatment and safety. Be sure to make and go to all appointments, and call your doctor if you are having problems. It's also a good idea to know your test results and keep a list of the medicines you take.  How can you care for yourself at home?  Make a plan for your safety   If you decide to stay with your abusive spouse or partner, you can do the following to increase your safety:  Decide what works best to keep you " safe in an emergency.  Know who you can call to help you in an emergency.  Decide if you will call the police if you get hurt again. If you can, agree on a signal with your children or neighbor to call the police for you if you need help. You can flash lights or hang something out of a window.  Choose a safe place to go for a short time if you need to leave home. Memorize the address and phone number.  Learn escape routes out of your home in case you need to leave in a hurry. Teach your children different ways to get out of your home quickly if they need to.  If you can, hide or lock up things that can be used as weapons (guns, knives, hammers).  Learn the number of a domestic violence shelter. Talk to the people there about how they can help.  Find out about other community resources that can help you.  Take pictures of bruises or other injuries if you can. You can also take pictures of things your abuser has broken.  Teach your children that violence is never okay. Tell them that they do not deserve to be hurt.  Pack a bag   Prepare a bag with things you will need if you leave suddenly. Leave it with a friend, a relative, or someone else you trust. You could include the following items in the bag:  A set of keys to your home and car.  Emergency phone numbers and addresses.  Money such as cash or checks. You can also ask a friend, a relative, or someone else you trust to hold money for you.  Copies of legal documents such as house and car titles or rent receipts, birth certificates, Social Security card, voter registration, marriage and 's licenses, and your children's health records.  Personal items you would need for a few days, such as clothes, a toothbrush, toothpaste, and any medicines you or your children need.  A favorite toy or book for your child or children.  Diapers and bottles, if you have very young children.  Pictures that show signs of abuse and violence. You may also add pictures of your  "abuser.  If you leave   If you decide to leave, you can take the following steps:  Go to the emergency room at a hospital if you have been hurt.  Think about asking the police to be with you as you leave. They can protect you as you leave your home.  If you decide to leave secretly, remember that activities can be tracked. Your abuser may still have access to your cell phone, email, and credit cards. It may be possible for these to be traced. Always be aware of your surroundings.  If this is an emergency, do not worry about gathering up anything. Just leave--your safety is most important.  If your abuser moves out, change the locks on the doors. If you have a security system, change the access code.  When should you call for help?   Call 911 anytime you think you may need emergency care. For example, call if:    You or someone else has just been abused.     You think you or someone else is in danger of being abused.   Watch closely for changes in your health, and be sure to contact your doctor if you have any problems.  Where can you learn more?  Go to https://www.healthSporterpilot.net/patiented  Enter A752 in the search box to learn more about \"Intimate Partner Violence Safety Instructions: Care Instructions.\"  Current as of: June 24, 2023  Content Version: 14.2 2024 [facility].   Care instructions adapted under license by your healthcare professional. If you have questions about a medical condition or this instruction, always ask your healthcare professional. Healthwise, Incorporated disclaims any warranty or liability for your use of this information.    Learning About Intimate Partner Violence  What is intimate partner violence?  Intimate partner violence is a type of domestic abuse. It's threatening, emotionally harmful, or violent behavior in a personal relationship. It can happen between past or current partners or spouses. In some relationships both people abuse each other. One partner may be more " abusive. Or the abuse may be equal.  Abuse can affect people of any ethnic group, race, or Spiritism. It can affect teens, adults, or the elderly. And it can happen to people of any sexual orientation, gender, or social status.  Abusers use fear, bullying, and threats to control their partners. They may control what their partners do. They may control where their partners go or who they see. They may act jealous, controlling, or possessive. These early signs of abuse may happen soon after the start of the relationship. Sometimes it can be hard to notice abuse at first. But after the relationship becomes more serious, the abuse may get worse.  If you are being abused in your relationship, it's important to get help. The abuse is not your fault. You don't have to face it alone.  Be careful  It may not be safe to take home domestic abuse information like this handout. Some people ask a trusted friend to keep it for them. It's also important to plan ahead and to memorize the phone number of places you can go for help. If you are concerned about your safety, do not use your computer, smartphone, or tablet to read about domestic abuse.   What are the types of intimate partner violence?  Abuse can happen in different ways. Each type can happen on its own or in combination with others.  Emotional abuse  Emotional abuse is a pattern of threats, insults, or controlling behavior. It includes verbal abuse. It goes beyond healthy disagreements in a relationship. It's a sign of an unhealthy relationship.  Do you feel threatened, intimidated, or controlled?  Does your partner:  Threaten your children, other family members, or pets?  Use jokes meant to embarrass or shame you?  Call you names?  Tell you that you are a bad parent?  Threaten to take away your children?  Threaten to have you or your family members deported?  Control your access to money or other basic needs?  Control what you do, who you see or talk to, or where you  go?  Another form of emotional abuse is denying that it is happening. Or the abuser may act like the abuse is no big deal or is your fault.  Sexual abuse  With sexual abuse, abusers may try to convince or force you to have sex. They may force you into sex acts you're not comfortable with. Or they may sexually assault you. Sexual abuse can happen even if you are in a committed relationship.  Physical abuse  Physical abuse means that a partner hits, kicks, or does something else to physically hurt you. Physical abuse that starts with a slap might lead to kicking, shoving, and choking over time. The abuser may also threaten to hurt or kill you.  Stalking  Stalking means that an abuser gives you attention that you do not want and that causes you fear. Examples of stalking include:  Following you.  Showing up at places where the abuser isn't invited, such as at your work or school.  Constantly calling or texting you.  What problems can  to?  Intimate partner violence can be very dangerous. It can cause serious, repeated injury. It can even lead to death.  All forms of abuse can cause long-term health problems from the stress of a violent relationship. Verbal abuse can lead to sexual and physical abuse.  Abuse causes:  Emotional pain.  Depression.  Anxiety.  Post-traumatic stress.  Sexual abuse can lead to sexually transmitted infections (such as HIV/AIDS) and unplanned pregnancy.  Pregnancy can be a very dangerous time for people in abusive relationships. Abuse can cause or increase the risk of problems during pregnancy. These include low weight gain, anemia, infections, and bleeding. Abuse may also increase your baby's risk of low birth weight, premature birth, and death.  It can be hard for some victims of abuse to ask for help or to leave their relationship. You may feel scared, stuck, or not sure what steps to take. But it's important not to ignore abuse. Talking to someone you trust could be the first step to  "ending the abuse and taking care of your own health and happiness again. There are resources available that can help keep you safe.  Where can you get help?  Talk to a trusted friend. Find a local advocacy group, or talk to your doctor about the abuse.  Contact the National Domestic Violence Hotline at 0-689-342-FNST (1-822.556.3734) for more safety tips. They can guide you to groups in your area that can help. Or go to the National Coalition Against Domestic Violence website at www.theSpoqa.org to learn more.  Domestic violence groups or a counselor in your area can help you make a safety plan for yourself and your children.  When to call for help  Call 911 anytime you think you may need emergency care. For example, call if:  You think that you or someone you know is in danger of being abused.  You have been hurt and can't have someone safely take you to emergency care.  You have just been abused.  A family member has just been abused.  Where can you learn more?  Go to https://www.[employer]!.net/patiented  Enter S665 in the search box to learn more about \"Learning About Intimate Partner Violence.\"  Current as of: June 24, 2023  Content Version: 14.2 2024 [facility] [employer].   Care instructions adapted under license by your healthcare professional. If you have questions about a medical condition or this instruction, always ask your healthcare professional. [employer] disclaims any warranty or liability for your use of this information.    Vaginal Bleeding During Pregnancy: Care Instructions  Overview     It's common to have some vaginal spotting when you are pregnant. In some cases, the bleeding isn't serious. And there aren't any more problems with the pregnancy.  But sometimes bleeding is a sign of a more serious problem. This is more common if the bleeding is heavy or painful. Examples of more serious problems include miscarriage, an ectopic pregnancy, and a problem with the placenta.  You " may have to see your doctor again to be sure everything is okay. You may also need more tests to find the cause of the bleeding.  Home treatment may be all you need. But it depends on what is causing the bleeding. Be sure to tell your doctor if you have any new symptoms or if your symptoms get worse.  The doctor has checked you carefully, but problems can develop later. If you notice any problems or new symptoms, get medical treatment right away.  Follow-up care is a key part of your treatment and safety. Be sure to make and go to all appointments, and call your doctor if you are having problems. It's also a good idea to know your test results and keep a list of the medicines you take.  How can you care for yourself at home?  If your doctor prescribed medicines, take them exactly as directed. Call your doctor if you think you are having a problem with your medicine.  Do not have vaginal sex until your doctor says it's okay.  Do not put anything in your vagina until your doctor says it's okay.  Ask your doctor about other activities you can or can't do.  Get a lot of rest. Being pregnant can make you tired.  Do not use nonsteroidal anti-inflammatory drugs (NSAIDs), such as ibuprofen (Advil, Motrin), naproxen (Aleve), or aspirin, unless your doctor says it is okay.  When should you call for help?   Call 911 anytime you think you may need emergency care. For example, call if:    You passed out (lost consciousness).     You have severe vaginal bleeding. This means you are soaking through a pad each hour for 2 or more hours.     You have sudden, severe pain in your belly or pelvis.   Call your doctor now or seek immediate medical care if:    You have new or worse vaginal bleeding.     You are dizzy or lightheaded, or you feel like you may faint.     You have pain in your belly, pelvis, or lower back.     You think that you are in labor.     You have a sudden release of fluid from your vagina.     You've been having  regular contractions for an hour. This means that you've had at least 8 contractions within 1 hour or at least 4 contractions within 20 minutes, even after you change your position and drink fluids.     You notice that your baby has stopped moving or is moving much less than normal.   Watch closely for changes in your health, and be sure to contact your doctor if you have any problems.  Current as of: July 10, 2023  Content Version: 14.2 2024 [facility] [employer].   Care instructions adapted under license by your healthcare professional. If you have questions about a medical condition or this instruction, always ask your healthcare professional. Healthwise[employer] Inc[employer] disclaims any warranty or liability for your use of this information.

## 2024-10-17 NOTE — PROGRESS NOTES
SUBJECTIVE:     HPI:    This is a 34 year old female patient,  who presents for her first obstetrical visit.    RAMIN: 2025, by Last Menstrual Period.  She is 8w0d weeks.  Her cycles are regular.  Her last menstrual period was normal.   Since her LMP, she has experienced  nausea and fatigue).   She denies emesis, abdominal pain, headache, loss of appetite, vaginal discharge, dysuria, pelvic pain, urinary urgency, lightheadedness, urinary frequency, vaginal bleeding, hemorrhoids, and constipation.    Additional History: Second Pregnancy  Hx: AMA, PreE, IUGR, fetal VSD  IOL @ 37wks, fast labor once she got going  Desires NIPS  Baseline PreE labs ordered    Have you travelled during the pregnancy?No  Have your sexual partner(s) travelled during the pregnancy?No      HISTORY:   Planned Pregnancy: Yes  Marital Status:   Occupation: HR, -- works remote  Living in Household: Spouse and Children    Past History:  Her past medical history   Past Medical History:   Diagnosis Date    Vulvovaginitis    .      She has a history of  preeclampsia and IUGR    Since her last LMP she denies use of alcohol, tobacco and street drugs.    Past medical, surgical, social and family history were reviewed and updated in [place].        Current Outpatient Medications   Medication Sig Dispense Refill    Prenatal Vit-Fe Fumarate-FA (PNV PRENATAL PLUS MULTIVITAMIN) 27-1 MG TABS per tablet Take 1 tablet by mouth daily.       No current facility-administered medications for this visit.       ROS:   12 point review of systems negative other than symptoms noted below or in the HPI.  Constitutional: Fatigue      OBJECTIVE:     EXAM:  LMP 2024 (Exact Date)  There is no height or weight on file to calculate BMI.        ASSESSMENT/PLAN:       ICD-10-CM    1. Supervision of high-risk pregnancy of elderly multigravida  O09.529           34 year old , 8w0d weeks of pregnancy with RAMIN of 2025, by Last Menstrual  Period    Confirmed patient DESIRES delivery at [facility] with the [facility] provider.    Nurse phone visit completed. Prenatal book and folder (containing standard educational hand-outs and brochures) will be given at next visit to patient. Information in folder reviewed over the phone. Questions answered. Brochure given on optional screening available to assess chromosomal anomalies. Pt DESIRES NIPS. Pt advised to call the clinic if she has any questions or concerns related to her pregnancy. Prenatal labs future ordered. New prenatal visit scheduled with Og Wilburn RN on 10/17/2024 at 10:22 AM   [facility]  .    Lab Results   Component Value Date    PAP NIL 11/06/2018       PHQ-9 score:        10/16/2024     4:17 PM   PHQ   PHQ-9 Total Score 0   Q9: Thoughts of better off dead/self-harm past 2 weeks Not at all                   12/6/2022    11:48 AM 2/6/2024     2:13 PM 10/16/2024     4:16 PM   IVAN-7 SCORE   Total Score   0 (minimal anxiety)   Total Score 0 0 0           Patient supplied answers from flow sheet for:  Prenatal OB Questionnaire.  Past Medical History  Have you ever recieved care for your mental health? : No  Have you ever been in a major accident or suffered serious trauma?: No  Within the last year, has anyone hit, slapped, kicked or otherwise hurt you?: No  In the last year, has anyone forced you to have sex when you didn't want to?: No    Past Medical History 2   Have you ever received a blood transfusion?: No  Would you accept a blood transfusion if was medically recommended?: Yes  Does anyone in your home smoke?: No   Is your blood type Rh negative?: Unknown  Have you ever ?: No  Have you been hospitalized for a nonsurgical reason excluding normal delivery?: No  Have you ever had an abnormal pap smear?: No    Past Medical History (Continued)  Do you have a history of abnormalities of the uterus?: No  Did your mother take JOHN or any  other hormones when she was pregnant with you?: Unknown  Do you have any other problems we have not asked about which you feel may be important to this pregnancy?: No

## 2024-10-22 LAB
ABO/RH(D): NORMAL
ANTIBODY SCREEN: NEGATIVE
SPECIMEN EXPIRATION DATE: NORMAL

## 2024-10-23 ENCOUNTER — ANCILLARY PROCEDURE (OUTPATIENT)
Dept: ULTRASOUND IMAGING | Facility: CLINIC | Age: 34
End: 2024-10-23
Payer: COMMERCIAL

## 2024-10-23 ENCOUNTER — TRANSCRIBE ORDERS (OUTPATIENT)
Dept: MATERNAL FETAL MEDICINE | Facility: CLINIC | Age: 34
End: 2024-10-23

## 2024-10-23 ENCOUNTER — PRENATAL OFFICE VISIT (OUTPATIENT)
Dept: OBGYN | Facility: CLINIC | Age: 34
End: 2024-10-23
Payer: COMMERCIAL

## 2024-10-23 VITALS
HEIGHT: 62 IN | WEIGHT: 158 LBS | DIASTOLIC BLOOD PRESSURE: 60 MMHG | BODY MASS INDEX: 29.08 KG/M2 | SYSTOLIC BLOOD PRESSURE: 120 MMHG

## 2024-10-23 DIAGNOSIS — O09.529 SUPERVISION OF HIGH-RISK PREGNANCY OF ELDERLY MULTIGRAVIDA: ICD-10-CM

## 2024-10-23 DIAGNOSIS — O09.529 SUPERVISION OF HIGH-RISK PREGNANCY OF ELDERLY MULTIGRAVIDA: Primary | ICD-10-CM

## 2024-10-23 DIAGNOSIS — O26.90 PREGNANCY RELATED CONDITION, ANTEPARTUM: Primary | ICD-10-CM

## 2024-10-23 DIAGNOSIS — Z82.79 FAMILY HISTORY OF CONGENITAL HEART DEFECT: ICD-10-CM

## 2024-10-23 DIAGNOSIS — O36.80X0 PREGNANCY WITH INCONCLUSIVE FETAL VIABILITY: ICD-10-CM

## 2024-10-23 DIAGNOSIS — O09.291 HISTORY OF INTRAUTERINE GROWTH RESTRICTION IN PRIOR PREGNANCY, CURRENTLY PREGNANT IN FIRST TRIMESTER: ICD-10-CM

## 2024-10-23 LAB
ALBUMIN SERPL BCG-MCNC: 4.3 G/DL (ref 3.5–5.2)
ALBUMIN UR-MCNC: NEGATIVE MG/DL
ALP SERPL-CCNC: 58 U/L (ref 40–150)
ALT SERPL W P-5'-P-CCNC: 15 U/L (ref 0–50)
ANION GAP SERPL CALCULATED.3IONS-SCNC: 11 MMOL/L (ref 7–15)
APPEARANCE UR: CLEAR
AST SERPL W P-5'-P-CCNC: 21 U/L (ref 0–45)
BILIRUB SERPL-MCNC: 0.2 MG/DL
BILIRUB UR QL STRIP: NEGATIVE
BUN SERPL-MCNC: 5.4 MG/DL (ref 6–20)
CALCIUM SERPL-MCNC: 9.6 MG/DL (ref 8.8–10.4)
CHLORIDE SERPL-SCNC: 100 MMOL/L (ref 98–107)
COLOR UR AUTO: YELLOW
CREAT SERPL-MCNC: 0.52 MG/DL (ref 0.51–0.95)
EGFRCR SERPLBLD CKD-EPI 2021: >90 ML/MIN/1.73M2
ERYTHROCYTE [DISTWIDTH] IN BLOOD BY AUTOMATED COUNT: 14.7 % (ref 10–15)
EST. AVERAGE GLUCOSE BLD GHB EST-MCNC: 105 MG/DL
GLUCOSE SERPL-MCNC: 91 MG/DL (ref 70–99)
GLUCOSE UR STRIP-MCNC: NEGATIVE MG/DL
HBA1C MFR BLD: 5.3 % (ref 0–5.6)
HBV SURFACE AG SERPL QL IA: NONREACTIVE
HCO3 SERPL-SCNC: 22 MMOL/L (ref 22–29)
HCT VFR BLD AUTO: 42.6 % (ref 35–47)
HCV AB SERPL QL IA: NONREACTIVE
HGB BLD-MCNC: 13.2 G/DL (ref 11.7–15.7)
HGB UR QL STRIP: NEGATIVE
KETONES UR STRIP-MCNC: NEGATIVE MG/DL
LEUKOCYTE ESTERASE UR QL STRIP: NEGATIVE
MCH RBC QN AUTO: 26.4 PG (ref 26.5–33)
MCHC RBC AUTO-ENTMCNC: 31 G/DL (ref 31.5–36.5)
MCV RBC AUTO: 85 FL (ref 78–100)
NITRATE UR QL: NEGATIVE
PH UR STRIP: 6.5 [PH] (ref 5–7)
PLATELET # BLD AUTO: 344 10E3/UL (ref 150–450)
POTASSIUM SERPL-SCNC: 3.8 MMOL/L (ref 3.4–5.3)
PROT SERPL-MCNC: 8.3 G/DL (ref 6.4–8.3)
RBC # BLD AUTO: 5 10E6/UL (ref 3.8–5.2)
SODIUM SERPL-SCNC: 133 MMOL/L (ref 135–145)
SP GR UR STRIP: 1.02 (ref 1–1.03)
UROBILINOGEN UR STRIP-ACNC: 0.2 E.U./DL
WBC # BLD AUTO: 10 10E3/UL (ref 4–11)

## 2024-10-23 PROCEDURE — 87340 HEPATITIS B SURFACE AG IA: CPT | Performed by: OBSTETRICS & GYNECOLOGY

## 2024-10-23 PROCEDURE — 86762 RUBELLA ANTIBODY: CPT | Performed by: OBSTETRICS & GYNECOLOGY

## 2024-10-23 PROCEDURE — 87086 URINE CULTURE/COLONY COUNT: CPT | Performed by: OBSTETRICS & GYNECOLOGY

## 2024-10-23 PROCEDURE — G2211 COMPLEX E/M VISIT ADD ON: HCPCS | Performed by: OBSTETRICS & GYNECOLOGY

## 2024-10-23 PROCEDURE — 36415 COLL VENOUS BLD VENIPUNCTURE: CPT | Performed by: OBSTETRICS & GYNECOLOGY

## 2024-10-23 PROCEDURE — 85027 COMPLETE CBC AUTOMATED: CPT | Performed by: OBSTETRICS & GYNECOLOGY

## 2024-10-23 PROCEDURE — 86900 BLOOD TYPING SEROLOGIC ABO: CPT | Performed by: OBSTETRICS & GYNECOLOGY

## 2024-10-23 PROCEDURE — 76801 OB US < 14 WKS SINGLE FETUS: CPT | Performed by: OBSTETRICS & GYNECOLOGY

## 2024-10-23 PROCEDURE — 83036 HEMOGLOBIN GLYCOSYLATED A1C: CPT | Performed by: OBSTETRICS & GYNECOLOGY

## 2024-10-23 PROCEDURE — 86901 BLOOD TYPING SEROLOGIC RH(D): CPT | Performed by: OBSTETRICS & GYNECOLOGY

## 2024-10-23 PROCEDURE — 86803 HEPATITIS C AB TEST: CPT | Performed by: OBSTETRICS & GYNECOLOGY

## 2024-10-23 PROCEDURE — 86850 RBC ANTIBODY SCREEN: CPT | Performed by: OBSTETRICS & GYNECOLOGY

## 2024-10-23 PROCEDURE — 81003 URINALYSIS AUTO W/O SCOPE: CPT | Performed by: OBSTETRICS & GYNECOLOGY

## 2024-10-23 PROCEDURE — 99215 OFFICE O/P EST HI 40 MIN: CPT | Performed by: OBSTETRICS & GYNECOLOGY

## 2024-10-23 PROCEDURE — 80053 COMPREHEN METABOLIC PANEL: CPT | Performed by: OBSTETRICS & GYNECOLOGY

## 2024-10-23 PROCEDURE — 87389 HIV-1 AG W/HIV-1&-2 AB AG IA: CPT | Performed by: OBSTETRICS & GYNECOLOGY

## 2024-10-23 PROCEDURE — 86780 TREPONEMA PALLIDUM: CPT | Performed by: OBSTETRICS & GYNECOLOGY

## 2024-10-23 PROCEDURE — 84156 ASSAY OF PROTEIN URINE: CPT | Performed by: OBSTETRICS & GYNECOLOGY

## 2024-10-23 NOTE — PATIENT INSTRUCTIONS
Avoid alcoholic beverages.  Discussed advanced maternal age and testing options.  Patient is above age 34 at delivery.  Explained risks of chromosomal abnormalities and age risks.  Discussed amnio versus non-invasive testing and their individual limitations and risks.  Patient would like non-invasive testing to start with.  Understands Prequel is a screenting test and not a diagnostic test. Will return for blood draw after 10 weeks.  Will check AFP at 15-20wks and arrange level II ultrasound with MFM at 18-20wks.  With history of cardiac defect in first son, will expect fetal echocardiogram with MFM at 22-24wks.  With history of growth restriction in first son, anticipate serial growth ultrsaounds in the third trimester.  Discussed that there is a reasonable likelihood that Sarita's growth issues were related to his heart issues.  With history of third trimester pre-eclampsia and AMA, will start daily baby aspirin at 12 weeks.  Reviewed usual and expected course of pregnancy including visits, labwork, imaging, vaccinations, etc. RTC 4wks

## 2024-10-23 NOTE — PROGRESS NOTES
SUBJECTIVE:      HPI:     This is a 34 year old female patient,  who presents for her first obstetrical visit.     RAMIN: 2025, by Last Menstrual Period.  She is 8w0d weeks.  Her cycles are regular.  Her last menstrual period was normal.   Since her LMP, she has experienced  nausea and fatigue).   She denies emesis, abdominal pain, headache, loss of appetite, vaginal discharge, dysuria, pelvic pain, urinary urgency, lightheadedness, urinary frequency, vaginal bleeding, hemorrhoids, and constipation.     Additional History: Second Pregnancy  Hx: AMA, PreE, IUGR, fetal VSD  IOL @ 37wks, fast labor once she got going      New OB visit --2nd pregnancy.  Stopped OCPs in  and had 3 normal monthly menses prior to conceiving (, , ).  Sure LMP and ultrasound today confirms dating with only 5d discrepancy.  EDC .  No vb/spotting.  Overall feeling really well.  Rare nausea and no emesis.  Energy better than she remembers with her first.  No bowel/bladder issues  AMA --interested in prequel testing and agrees to level II us with MFM  First pregnancy complicated by large VSD and required transfer to [place] for pregnancy cares;  son was also affected by FGR and Cassandra eventually developed pre-eclampsia with severe features and was delivered at 37+0wks.  Sarita had repair of VSD at 3 months of life and is doing great!!  -has URI symptoms today so would like to delay flu and covid vaccines until next week with her lab visit     Have you travelled during the pregnancy?No  Have your sexual partner(s) travelled during the pregnancy?No        HISTORY:   Planned Pregnancy: Yes  Marital Status:   Occupation: HR, -- works remote  Living in Household: Spouse and Children     Past History:  Her past medical history   Past Medical History        Past Medical History:   Diagnosis Date    Vulvovaginitis        .       She has a history of  preeclampsia and IUGR     Since her last LMP she denies use  of alcohol, tobacco and street drugs.     Past medical, surgical, social and family history were reviewed and updated in [facility].           Current Facility-Administered Medications          Current Outpatient Medications   Medication Sig Dispense Refill    Prenatal Vit-Fe Fumarate-FA (PNV PRENATAL PLUS MULTIVITAMIN) 27-1 MG TABS per tablet Take 1 tablet by mouth daily.          No current facility-administered medications for this visit.           ROS:   12 point review of systems negative other than symptoms noted below or in the HPI.  Constitutional: Fatigue      OBJECTIVE:      EXAM:  LMP 2024 (Exact Date)  There is no height or weight on file to calculate BMI.     ASSESSMENT/PLAN:          ICD-10-CM     1. Supervision of high-risk pregnancy of elderly multigravida  O09.529               34 year old , 8w0d weeks of pregnancy with RAMIN of 2025, by Last Menstrual Period     Confirmed patient DESIRES delivery at [facility] with the [facility] provider.     Nurse phone visit completed. Prenatal book and folder (containing standard educational hand-outs and brochures) will be given at next visit to patient. Information in folder reviewed over the phone. Questions answered. Brochure given on optional screening available to assess chromosomal anomalies. Pt DESIRES NIPS. Pt advised to call the clinic if she has any questions or concerns related to her pregnancy. Prenatal labs future ordered. New prenatal visit scheduled with Og Wilburn RN on 10/17/2024 at 10:22 AM   [facility]  .           Lab Results   Component Value Date     PAP NIL 2018      PHQ-9 score:         10/16/2024     4:17 PM   PHQ   PHQ-9 Total Score 0   Q9: Thoughts of better off dead/self-harm past 2 weeks Not at all              2022    11:48 AM 2024     2:13 PM 10/16/2024     4:16 PM   IVAN-7 SCORE   Total Score     0 (minimal anxiety)   Total Score 0 0 0         Patient  "supplied answers from flow sheet for:  Prenatal OB Questionnaire.  Past Medical History  Have you ever recieved care for your mental health? : No  Have you ever been in a major accident or suffered serious trauma?: No  Within the last year, has anyone hit, slapped, kicked or otherwise hurt you?: No  In the last year, has anyone forced you to have sex when you didn't want to?: No     Past Medical History 2   Have you ever received a blood transfusion?: No  Would you accept a blood transfusion if was medically recommended?: Yes  Does anyone in your home smoke?: No   Is your blood type Rh negative?: Unknown  Have you ever ?: No  Have you been hospitalized for a nonsurgical reason excluding normal delivery?: No  Have you ever had an abnormal pap smear?: No     Past Medical History (Continued)  Do you have a history of abnormalities of the uterus?: No  Did your mother take JOHN or any other hormones when she was pregnant with you?: Unknown  Do you have any other problems we have not asked about which you feel may be important to this pregnancy?: No           OBJECTIVE:     EXAM:  /60   Ht 1.575 m (5' 2\")   Wt 71.7 kg (158 lb)   LMP 08/22/2024 (Exact Date)   BMI 28.90 kg/m   Body mass index is 28.9 kg/m .    GENERAL: alert and no distress  EYES: Eyes grossly normal to inspection, PERRL and conjunctivae and sclerae normal  HENT: ear canals and TM's normal, nose and mouth without ulcers or lesions  NECK: no adenopathy, no asymmetry, masses, or scars  RESP: lungs clear to auscultation - no rales, rhonchi or wheezes  CV: regular rate and rhythm, normal S1 S2, no S3 or S4, no murmur, click or rub, no peripheral edema  ABDOMEN: soft, nontender, no hepatosplenomegaly, no masses and bowel sounds normal  MS: no gross musculoskeletal defects noted, no edema  SKIN: no suspicious lesions or rashes  NEURO: Normal strength and tone, mentation intact and speech normal  PSYCH: mentation appears normal, affect " normal/bright    ASSESSMENT/PLAN:       ICD-10-CM    1. Supervision of high-risk pregnancy of elderly multigravida  O09.529 ABO/Rh type and screen     Hepatitis B surface antigen     CBC with platelets     HIV Antigen Antibody Combo     Rubella Antibody IgG     Treponema Abs w Reflex to RPR and Titer     Urine Culture Aerobic Bacterial     *UA reflex to Microscopic     Hemoglobin A1c     Hepatitis C antibody     Protein  random urine     Comprehensive metabolic panel (BMP + Alb, Alk Phos, ALT, AST, Total. Bili, TP)     [facility] Referral - Pregnancy      2. Family history of congenital heart defect  Z82.79 Mat Fetal Med Ctr Referral - Pregnancy      3. History of intrauterine growth restriction in prior pregnancy, currently pregnant in first trimester  O09.291 Mat Fetal Med Ctr Referral - Pregnancy          34 year old , On 2024 patient is 8w6d weeks of pregnancy with RAMIN of 2025, by Last Menstrual Period    Discussed as follows:AMA status, hx Pre-eclampsia, hx FGR, vaccinations, etc    Counseling given:   - Follow up in 4-6 weeks for return OB visit.  - Recommended weight gain for pregnancy: 25-35 lbs.      PLAN/PATIENT INSTRUCTIONS:    Patient Instructions   Avoid alcoholic beverages.  Discussed advanced maternal age and testing options.  Patient is above age 34 at delivery.  Explained risks of chromosomal abnormalities and age risks.  Discussed amnio versus non-invasive testing and their individual limitations and risks.  Patient would like non-invasive testing to start with.  Understands Prequel is a screenting test and not a diagnostic test. Will return for blood draw after 10 weeks.  Will check AFP at 15-20wks and arrange level II ultrasound with MFM at 18-20wks.  With history of cardiac defect in first son, will expect fetal echocardiogram with MFM at 22-24wks.  With history of growth restriction in first son, anticipate serial growth ultrsaounds in the third trimester.   Discussed that there is a reasonable likelihood that Sarita's growth issues were related to his heart issues.  With history of third trimester pre-eclampsia and AMA, will start daily baby aspirin at 12 weeks.  Reviewed usual and expected course of pregnancy including visits, labwork, imaging, vaccinations, etc. RTC 4wks    45min spent on day of visit with chart review, imaging review, patient visit/counseling and documentation/planning     Jes Foley MD

## 2024-10-24 LAB
ALBUMIN MFR UR ELPH: 20.1 MG/DL
BACTERIA UR CULT: NORMAL
CREAT UR-MCNC: 157 MG/DL
HIV 1+2 AB+HIV1 P24 AG SERPL QL IA: NONREACTIVE
PROT/CREAT 24H UR: 0.13 MG/MG CR (ref 0–0.2)
RUBV IGG SERPL QL IA: 3.08 INDEX
RUBV IGG SERPL QL IA: POSITIVE
T PALLIDUM AB SER QL: NONREACTIVE

## 2024-10-31 ENCOUNTER — LAB (OUTPATIENT)
Dept: LAB | Facility: CLINIC | Age: 34
End: 2024-10-31
Payer: COMMERCIAL

## 2024-10-31 DIAGNOSIS — Z13.79 GENETIC SCREENING: ICD-10-CM

## 2024-10-31 DIAGNOSIS — O09.529 SUPERVISION OF HIGH-RISK PREGNANCY OF ELDERLY MULTIGRAVIDA: ICD-10-CM

## 2024-10-31 PROCEDURE — 36415 COLL VENOUS BLD VENIPUNCTURE: CPT

## 2024-11-05 ENCOUNTER — PRE VISIT (OUTPATIENT)
Dept: MATERNAL FETAL MEDICINE | Facility: CLINIC | Age: 34
End: 2024-11-05
Payer: COMMERCIAL

## 2024-11-11 ENCOUNTER — TELEPHONE (OUTPATIENT)
Dept: MATERNAL FETAL MEDICINE | Facility: CLINIC | Age: 34
End: 2024-11-11
Payer: COMMERCIAL

## 2024-11-11 LAB — SCANNED LAB RESULT: NORMAL

## 2024-11-11 NOTE — TELEPHONE ENCOUNTER
November 11, 2024    Attempted to call Cassandra regarding her upcoming appointment with [facility] genetic counseling to request access to her son's MyChart to review genetic testing results and applicable notes. A vague voicemail along with my contact information was left for Mita to return my call if she is available.     Karla Javier MS, [facility]  Certified and Licensed Genetic Counselor  [facility]  Maternal Fetal Medicine  Office: [phone]  [facility]: [phone]   Fax: [phone]  [facility]

## 2024-11-11 NOTE — PROGRESS NOTES
[facility]  Genetic Counseling Consult    Patient:  Cassandra Langley  Preferred Name: Cassandra YOB: 1990   Date of Service:  24   MRN: [id number]    Cassandra was seen at the [facility] for genetic consultation. The indication for genetic counseling is advanced maternal age and a history of a previous pregnancy complicated by a fetal congeital heart defect (VSD) and growth restriction.  The patient was unaccompanied to this visit.     The session was conducted in English.      IMPRESSION/ PLAN   1. Cassandra had genetic screening earlier in this pregnancy. Their non-invasive prenatal test was screen negative or low risk for screened conditions     2. During today's [facility] visit, Cassandra had a genetic counseling visit only. Cassandra was interested in pursuing carrier screening for this pregnancy, but wanted to do it at the same time as her partner, Paul. She requested a GC only visit for both of them to schedule next week. Orders were placed and the GC only visit and was scheduled for 2024 at [facility].     3. Since the patient chose aneuploidy screening via NIPT, quad screen is NOT recommended in the second trimester. If the patient desires screening for open neural tube defects, maternal serum AFP only is recommended, ideally between 16-18 weeks gestation.    4. Cassandra had a nuchal translucency ultrasound today. Please see the ultrasound report for further details.    5. Further recommendations include a fetal anatomy level II ultrasound with [facility] as well as a fetal echocardiogram with MFM. The upcoming level II comprehensive ultrasound has been scheduled for 2025 and the fetal echocardiogram has been scheduled for 2025.    PREGNANCY HISTORY   /Parity:       Cassandra's pregnancy history is significant for:   : Term pregnancy delivered vag-spont  with her 3 year old son, Sarita. Prenatally, Sarita was noted to have a ventricular septal defect (VSD) as well as fetal growth restriction. This pregnancy was also complicated with PreE and HTN. Sarita had surgery at 3 months of age to repair the VSD. Cassandra reports that her son has caught up to appropriate developmental milestones and is overall doing well. Sarita did have genetic testing on after delivery with a chromosomal microarray and limited g-band analysis. The chromosomal microarray returned NORMAL MALE and the chromosome analysis returned 46,XY.     Congenital heart defects can be isolated or associated with multiple birth defects (syndromic). There are many genetic syndromes, single gene disorders or chromosomal abnormalities, that can be associated with congenital heart defects. Isolated congenital heart defects are usually a multi-factorial condition caused by the combination of genetic and environmental factors and familial clustering is not uncommon. Since there is a genetic component to multi-factorial conditions, the recurrence risk is higher for first degree relatives (siblings) than in second degree relatives and decreases with distance in relationship.    Congenital heart defects are common, occurring in 0.5 to 1.0% live births. The specific recurrence risk for first degree relatives differs according to the type of congenital heart defect and if there is an associated genetic syndrome present. In general, studies show that the overall risk contributed by a positive family history of a congenital heart defect in 1st degree relatives for the same defect is 8.15% whereas the recurrence risk for a different heart defect is 2.68%. There are also recurrence risks specific to the heart defect. Often risks for second degree relatives are not available. Furthermore, for third degree relatives the risk is likely equal to general population risks.     Ventricular septal defect (VSD), in which there  is a hole in the wall that separates the right and left ventricles, is one of the most common types of congenital heart defects. This causes oxygen-rich and oxygen-poor blood to mix in the heart. Some ventricular septal defects close after birth. However, some individuals with a ventricular septal defect may require surgery. The risk for VSD in a baby is dependent on the affected relative: 2.05% (father affected), 3.0% (sibling affected), and 6.0% (mother affected).    CURRENT PREGNANCY   Current Age: 34 year old   Age at Delivery: 35 year old  RAMIN: 5/29/2025, by Last Menstrual Period                                   Gestational Age: 11w4d  This pregnancy is a single gestation.   This pregnancy was conceived spontaneously.    MEDICAL HISTORY   Cassandra s reported medical history is not expected to impact pregnancy management or risks to fetal development.       FAMILY HISTORY   A three-generation pedigree was previously obtained by pediatric genetic counselor, Renee Clark MS, [facility] on 2/28/2022 and scanned into Cassandra's son, Winsomes, chart. Cassandra provided verbal permission for my to access her son's chart to review appropriate medical records in. Updates to the family history were made today and a new pedigree is scanned into Cassandra's chart.  The family history was reported by Cassandra.    There were no significant findings reported today. Cassandra's partner, Paul, is reportedly healthy.    Otherwise, the reported family history is unremarkable for multiple miscarriages, hearing loss or vision loss, birth defects, intellectual disabilities, known genetic conditions, and consanguinity.       RISK ASSESSMENT FOR INHERITED CONDITIONS AND CARRIER SCREENING OPTIONS   Expanded carrier screening is available to screen for autosomal recessive conditions and X-linked conditions in a large list of genes. Carrier screening does not test the pregnancy but gives a risk assessment for the pregnancy and future  pregnancies to have the condition. Expanded carrier screening is designed to identify carrier status for conditions that are primarily childhood or adolescent onset. Expanded carrier screening does not evaluate for adult-onset conditions such as hereditary cancer syndromes, dementia/ Alzheimer's disease, or cardiovascular disease risk factors. Additionally, expanded carrier screening is not comprehensive for all known genetic diseases or inherited conditions. Carrier screening does not test for all genetic and health conditions or risk factors.     Autosomal recessive conditions happen when a mutation has been inherited from the egg and sperm and include conditions like cystic fibrosis, thalassemia, hearing loss, spinal muscular atrophy, and more. We reviewed that when both biological parents carry a harmful genetic change in a gene associated with autosomal recessive inheritance, each of their pregnancies has a 1 in 4 (25%) chance to be affected by that condition. X-linked conditions happen when a mutation has been inherited from the egg and include conditions like fragile X syndrome.With x-linked conditions, the specific risk generally depends on the chromosomal sex of the fetus, with XY individuals (generally male) being most severely affected.     [place] screening was reviewed. About [place] Screening    The patient does have a family history of a known inherited condition. Cassandra's son, Sarita, was identified to have hemoglobin E  trait identified on hemoglobin electrophoresis on 2022. Cassandra believed that it was inherited from Sarita's father, Ty, however this is no note of parental testing in Cassandra's chart.     Hemoglobin E  Hemoglobin is a major component of red blood cells. Hemoglobinopathies are conditions that affect the level or structure of the hemoglobin and cause conditions such as thalassemia or conditions like sickle cell disease. An individual with a hemoglobinopathy often  inherited the condition from their carrier parents. Some carriers can also have symptoms. Lab values like mean corpuscular volume (MCV) can suggest a hemoglobinopathy is present when the value is low. To screen for hemoglobinopathies a hemoglobin electrophoresis can be used to screen for beta thalassemia, or other hemoglobin variants like sickle cell disease. However, alpha thalassemia is often not detected on hemoglobin electrophoresis. Carrier screening by molecular sequencing methods is also available and can be helpful, especially in the case of alpha thalassemia.    The following is true for a typical individual:  4 copies of alpha genes that encode for the alpha subunit of hemoglobin (2 copies of HBA1 gene + 2 copies of HBA2 gene)   2 copies of HBB gene that encodes for the beta subunit   2 copies of HBD gene that encodes for the delta subunit   4 copies of gamma genes that encode for the gamma subunit of hemoglobin. This is typically only expressed during fetal development and decreases after birth when the beta subunit is more represented    The typical individual has the following hemoglobin composition:   >87% Hemoglobin A (HbA) which is made up of two alpha subunits and two beta subunits  <3% Hemoglobin A2 (HbA2) which is made up of two alpha subunits and two delta subunits   <2% Hemoglobin F which is made up of two alpha subunits and two gamma subunits (higher hemoglobin F can be protective if the individual has a hemoglobinopathy)    Thalassemia conditions are caused by reductions in the alpha or beta subunit. Variant conditions, like sickle cell disease, are cause by mutations in the HBB gene that modify the structure or function of the beta subunit. Hemoglobinopathies have a wide spectrum of symptoms because an individual can have a condition due to the combination of different mutations or even mutations on an alpha and beta gene.     Hemoglobin E is a beta chain variant. An individual with E trait  (also described as AE) will typically have 20-40% hemoglobin E (HbE) and remaining HbA (HbA2 has the same analysis peak as E so it is often not reported). HbE variant is common in the [place]  population (about 30-40% individuals). In fact, HbE is the third most prevalent hemoglobin type in the world. Individuals with HbE trait are typically clinically normal with normal red cell survival and possible mild microcytosis. If two individuals, both AE, have a child there is a 25% chance of a child with EE anemia which is mild. If an AE has a child with a AS individual (sickle cell carrier or trait), there is a 25% chance of a child with sickle/E disease, a mild sickle cell disease. The greatest concern is for a AE individual to have a child with an individual that is a carrier for beta thalassemia (or has beta thalassemia). Hemoglobin E/Beta Thalassemia (?0=absence of normal beta chain) can have severe disease.     The patient has not had carrier screening previously.     Carrier screening was discussed today, particularly to screen for other beta-thalassemia variants that could potentially increase the risk of a severe hemoglobinopathy with Hemoglobin E/Beta Thalassemia. We discussed the option of a [employer] 267 gene panel, [employer] 613 gene panel, and [place] by [facility]. Cassandra expressed that she and Ty would like to pursue carrier screening through [employer] for 267 conditions, however, she wanted Ty to be with her when she had her blood drawn so that they could both do the screening at the same time. Cassandra plans to return for a  only visit with Ty next week to consent for carrier screening. Orders were placed for this visit. Please see below for the information discussed about carrier screening, however, formal paperwork will be signed at Cassandra and Ty's next visit.   Carrier screening does not test the pregnancy but gives a risk assessment for the pregnancy and future pregnancies to have the  condition. The only way to determine with absolutely certainty whether if a pregnancy is affected with a  genetic condition is through diagnostic testing such as a chorionic villus sampling or amniocentesis. Other options would include testing baby after delivery.   There are different size panels or list of conditions for carrier screening.   Some conditions cause health problems for carriers. We discussed that in the event of an incidental finding, further evaluation regarding screening or further testing may be recommended.   Carrier screening does not test for all genetic and health conditions or risk factors  There are limitations to current technology and results may be updated at a later date  The results typically take 2-3 weeks.     [facility] [employer] uses the same technology as standard NIPT to assess risks for genetic conditions affecting pregnancy. The testing uses massive parallel sequencing to assess placental DNA fragments and DNA fragments from the pregnant person, and based on the quantification of regions of interest can provide risk assessments for whole chromosome abnormalities and a handful of autosomal recessive conditions. The [facility] test first performs routine carrier screening for cystic fibrosis, spinal muscular atrophy and hemoglobinopathies (HBB, HBA1, HBA2) to determine carrier status of the pregnant person through sequencing. Screening can also include testing for Fragile X syndrome related to a trinucleotide repeat expansion in the FMR1 gene. If the pregnant person is determined to be a carrier, the testing then reflexes to look for additional variants or additional changes in copy numbers, detected at low levels that would be representative of the cell-free DNA from the pregnancy. This can only be done for HBA1, HBA2, HBB, CFTR, and SMN1. A fetal risk assessment is not able to be provided for FMR1.      The benefits of [facility] is that the other biological parent of the  pregnancy's sample is not needed and the test is non-invasive. The testing skips traditional carrier screening and looks for the presence of the disease in the pregnancy. This is NOT an alternative to diagnostic testing such as an amniocentesis. Since [employer] is a screen, there are false positives and false negatives.     RISK ASSESSMENT FOR CHROMOSOME CONDITIONS   We explained that the risk for fetal chromosome abnormalities increases with maternal age. We discussed specific features of common chromosome abnormalities, including trisomy 21 (Down syndrome), trisomy 13, trisomy 18, and sex chromosome trisomies.    At age 35 at delivery, the risk to have a baby with Down syndrome is 1 in 294.   At age 35 at delivery, the risk to have a baby with any chromosome abnormality is 1 in 163.     Cassandra had genetic screening earlier in this pregnancy. Their non-invasive prenatal test was screen negative or low risk for screened conditions     Non-invasive prenatal testing (NIPT) results  Maternal plasma cell-free DNA testing  Screens for fetal trisomy 21, trisomy 13, trisomy 18, and sex chromosome aneuploidy  Please see the [facility] ultrasound note from 11/12/2024 for details regarding the nuchal translucency measurement obtained today.   Cassandra had a NIPT test earlier in pregnancy; we reviewed the results today, which are low risk.  The NIPT did include sex chromosome aneuploidies and the result was low risk. The predicted sex is XY, which is typically male.  Given the accuracy of this test, these results greatly decrease the chance for certain fetal chromosome abnormalities  We discussed the limitations of normal NIPT results  Maternal serum AFP only to screen for open neural tube defects (after 15 weeks) is not yet available due to early gestation but could be done after 15 weeks.     GENETIC TESTING OPTIONS   Genetic testing during a pregnancy includes screening and diagnostic procedures.      Screening tests are  non-invasive which means no risk to the pregnancy and includes ultrasounds and blood work. The benefits and limitations of screening were reviewed. Screening tests provide a risk assessment (chance) specific to the pregnancy for certain fetal chromosome abnormalities but cannot definitively diagnose or exclude a fetal chromosome abnormality. Follow-up genetic counseling and consideration of diagnostic testing is recommended with any abnormal screening result. Diagnostic testing during a pregnancy is more certain and can test for more conditions. However, the tests do have a risk of miscarriage that requires careful consideration. These tests can detect fetal chromosome abnormalities with greater than 99% certainty. Results can be compromised by maternal cell contamination or mosaicism and are limited by the resolution of current genetic testing technology.     There is no screening or diagnostic test that detects all forms of birth defects or intellectual disability.     We discussed the following screening options:     Non-invasive prenatal testing (NIPT)  Also called cell-free DNA screening because it detects chromosomes from the placenta in the pregnant person's blood  Can be done any time after 10 weeks gestation  Standard recommendation for NIPT screens for trisomy 21, trisomy 18, trisomy 13, with the option of adding sex chromosome aneuploidies, without or without predicted sex  Cannot screen for open neural tube defects, maternal serum AFP after 15 weeks is recommended  New NIPT options include screening for other trisomies, microdeletion syndromes, and in some cases fetal blood antigens. Guidelines do not recommend these conditions are included in standard screening. These options have limitations and should be discussed with a genetic counselor.   However, current (2023) ACMG guidelines do recommend that screening for one microdeletion syndrome, called 22q11.2 deletion syndrome be offered to all pregnant  patients. 22q11.2 deletion syndrome has an estimated prevalence of 1 in 990 to 1 in 2148 (0.05-0.1%). Risk is not thought to increase with maternal age. Clinical features are variable but include congenital heart defects, cleft palate, developmental delays, immune system deficiencies, and hearing loss. Approximately 90% of cases are de johan (a sporadic new change in a pregnancy). Cell-free DNA screening for 22q11.2 deletion syndrome is available with the inclusion of other microdeletion syndromes. There is less data about the performance of cell-free DNA screening for more rare microdeletions and the chance for false positives or negative may be increased.  We discussed the limitations of cell-free DNA screening in detecting microdeletions and the possibility of false positives and false negatives. Microdeletion screening was not discussed and was not included in the NIPT ordered by Cassandra's primary OB.     Carrier screening  Risk assessment for certain autosomal recessive and x-linked conditions. These conditions are generally infantile- or childhood-onset conditions.   Can be done any time during the pregnancy or prior to pregnancy.  Can screen for over many different genetic conditions, panel size ranging by lab.  Is not intended to diagnosis a condition in the carrier parent.    Even with negative results, a residual risk for screened conditions remains.      We discussed the following ultrasound options:    Nuchal translucency (NT) ultrasound  Ultrasound between [id number] that includes nuchal translucency measurement and nasal bone assessments  Nuchal translucency refers to the space at the back of the neck where fluid builds up. All babies at this stage have fluid and there is only concern if there is too much fluid  Nasal bone refers to the small bone in the nose. There is concern for conditions like Down syndrome if the bone cannot be seen at all  This ultrasound can be done as part of first trimester  screening, at the same time as another screen (NIPT), at the same time as a CVS, or if the patients does not want genetic screening.  Markers on ultrasound detects about 70% of pregnancies with aneuploidy  Abnormalities on NT ultrasound can also increase the risk for a birth defect, like a heart defect    Comprehensive level II ultrasound (Fetal Anatomy Ultrasound)  Ultrasound done between 18-20 weeks gestation  Screens for major birth defects and markers for aneuploidy (like trisomy 21 and trisomy 18)  Includes looking at the fetus/baby's growth, heart, organs (stomach, kidneys), placenta, and amniotic fluid    Fetal Echocardiogram  Ultrasound done between 22-24 weeks gestation  Screen for heart defects  Recommended if there are concerns about the heart or other indications like IVF pregnancy, maternal diabetes, or other ultrasound findings for concerns of a fetal heart defect, such as a nuchal translucency greater than the 99th percentile in the first trimester    We discussed the following diagnostic options:     Chorionic villus sampling (CVS)  Invasive diagnostic procedure done between 10w0d and 13w6d  The procedure collects a small sample from the placenta for the purpose of chromosomal testing and/or other genetic testing  Diagnostic result; more than 99% sensitivity for fetal chromosome abnormalities  Cannot screen for open neural tube defects, maternal serum AFP after 15 weeks is recommended    Amniocentesis  Invasive diagnostic procedure done after 15 weeks gestation  The procedure collects a small sample of amniotic fluid for the purpose of chromosomal testing and/or other genetic testing  Diagnostic result; more than 99% sensitivity for fetal chromosome abnormalities  Testing for AFP in the amniotic fluid can test for open neural tube defects    It was a pleasure to be involved with [facility] [place]. Face-to-face time of the meeting was 40 minutes.    Karla Javier, ELLY, MS, C  Board Certified and  [place] Licensed Genetic Counselor  [facility]  Maternal Fetal Medicine  Office: [phone]  [facility]: [phone]   Fax: [phone]  [facility]

## 2024-11-12 ENCOUNTER — HOSPITAL ENCOUNTER (OUTPATIENT)
Dept: ULTRASOUND IMAGING | Facility: CLINIC | Age: 34
Discharge: HOME | End: 2024-11-12
Attending: OBSTETRICS & GYNECOLOGY
Payer: COMMERCIAL

## 2024-11-12 ENCOUNTER — OFFICE VISIT (OUTPATIENT)
Dept: MATERNAL FETAL MEDICINE | Facility: CLINIC | Age: 34
End: 2024-11-12
Attending: OBSTETRICS & GYNECOLOGY
Payer: COMMERCIAL

## 2024-11-12 DIAGNOSIS — Z82.79 FAMILY HISTORY OF VSD (VENTRICULAR SEPTAL DEFECT): ICD-10-CM

## 2024-11-12 DIAGNOSIS — Z84.81 FAMILY HISTORY OF GENETIC DISEASE CARRIER: Primary | ICD-10-CM

## 2024-11-12 DIAGNOSIS — O09.521 MULTIGRAVIDA OF ADVANCED MATERNAL AGE IN FIRST TRIMESTER: Primary | ICD-10-CM

## 2024-11-12 DIAGNOSIS — O09.521 MULTIGRAVIDA OF ADVANCED MATERNAL AGE IN FIRST TRIMESTER: ICD-10-CM

## 2024-11-12 DIAGNOSIS — O26.90 PREGNANCY RELATED CONDITION, ANTEPARTUM: ICD-10-CM

## 2024-11-12 PROCEDURE — 76801 OB US < 14 WKS SINGLE FETUS: CPT

## 2024-11-12 PROCEDURE — 96040 HC GENETIC COUNSELING, EACH 30 MINUTES: CPT

## 2024-11-12 NOTE — NURSING NOTE
Patient here for GC/NT  Patient denies pain, contractions, leaking of fluid, or bleeding. Patient denies headache, visual changes, nausea/vomiting, epigastric pain related to preeclampsia.  SBAR given to MARCIA EDEN, see their note in Epic.

## 2024-11-12 NOTE — PROGRESS NOTES
The patient was seen for an [facility] in [facility] today.  For a detailed report of the ultrasound examination, please see the ultrasound report which can be found under the imaging tab.    If you have questions regarding today's evaluation or if we can be of further service, please contact the [facility].    Alexey Dumont M.D.  Maternal Fetal-Medicine Specialist

## 2024-11-20 ENCOUNTER — OFFICE VISIT (OUTPATIENT)
Dept: MATERNAL FETAL MEDICINE | Facility: CLINIC | Age: 34
End: 2024-11-20
Attending: STUDENT IN AN ORGANIZED HEALTH CARE EDUCATION/TRAINING PROGRAM
Payer: COMMERCIAL

## 2024-11-20 ENCOUNTER — PRENATAL OFFICE VISIT (OUTPATIENT)
Dept: OBGYN | Facility: CLINIC | Age: 34
End: 2024-11-20
Payer: COMMERCIAL

## 2024-11-20 ENCOUNTER — LAB (OUTPATIENT)
Dept: LAB | Facility: CLINIC | Age: 34
End: 2024-11-20
Attending: STUDENT IN AN ORGANIZED HEALTH CARE EDUCATION/TRAINING PROGRAM
Payer: COMMERCIAL

## 2024-11-20 VITALS — DIASTOLIC BLOOD PRESSURE: 80 MMHG | BODY MASS INDEX: 29.63 KG/M2 | WEIGHT: 162 LBS | SYSTOLIC BLOOD PRESSURE: 120 MMHG

## 2024-11-20 DIAGNOSIS — Z31.438 ENCOUNTER FOR OTHER GENETIC TESTING OF FEMALE FOR PROCREATIVE MANAGEMENT: ICD-10-CM

## 2024-11-20 DIAGNOSIS — Z82.79 FAMILY HISTORY OF CONGENITAL HEART DEFECT: ICD-10-CM

## 2024-11-20 DIAGNOSIS — Z23 NEED FOR PROPHYLACTIC VACCINATION AND INOCULATION AGAINST INFLUENZA: ICD-10-CM

## 2024-11-20 DIAGNOSIS — O09.521 MULTIGRAVIDA OF ADVANCED MATERNAL AGE IN FIRST TRIMESTER: ICD-10-CM

## 2024-11-20 DIAGNOSIS — Z84.81 FAMILY HISTORY OF CARRIER OF GENETIC DISEASE: ICD-10-CM

## 2024-11-20 DIAGNOSIS — Z84.81 FAMILY HISTORY OF CARRIER OF GENETIC DISEASE: Primary | ICD-10-CM

## 2024-11-20 DIAGNOSIS — O09.529 SUPERVISION OF HIGH-RISK PREGNANCY OF ELDERLY MULTIGRAVIDA: Primary | ICD-10-CM

## 2024-11-20 DIAGNOSIS — O26.90 PREGNANCY RELATED CONDITION, ANTEPARTUM: ICD-10-CM

## 2024-11-20 DIAGNOSIS — Z3A.12 12 WEEKS GESTATION OF PREGNANCY: ICD-10-CM

## 2024-11-20 DIAGNOSIS — Z23 HIGH PRIORITY FOR 2019-NCOV VACCINE: ICD-10-CM

## 2024-11-20 DIAGNOSIS — Z82.79 FAMILY HISTORY OF VSD (VENTRICULAR SEPTAL DEFECT): ICD-10-CM

## 2024-11-20 PROCEDURE — 36415 COLL VENOUS BLD VENIPUNCTURE: CPT

## 2024-11-20 PROCEDURE — 96040 HC GENETIC COUNSELING, EACH 30 MINUTES: CPT

## 2024-11-20 PROCEDURE — 90480 ADMN SARSCOV2 VAC 1/ONLY CMP: CPT | Performed by: OBSTETRICS & GYNECOLOGY

## 2024-11-20 PROCEDURE — 91320 SARSCV2 VAC 30MCG TRS-SUC IM: CPT | Performed by: OBSTETRICS & GYNECOLOGY

## 2024-11-20 PROCEDURE — 99207 PR PRENATAL VISIT: CPT | Performed by: OBSTETRICS & GYNECOLOGY

## 2024-11-20 PROCEDURE — 90656 IIV3 VACC NO PRSV 0.5 ML IM: CPT | Performed by: OBSTETRICS & GYNECOLOGY

## 2024-11-20 PROCEDURE — 90471 IMMUNIZATION ADMIN: CPT | Performed by: OBSTETRICS & GYNECOLOGY

## 2024-11-20 RX ORDER — ASPIRIN 81 MG/1
81 TABLET ORAL DAILY
COMMUNITY

## 2024-11-20 NOTE — PROGRESS NOTES
Doing well today  A bit more fatigue but older son not sleeping through the night this week  Occ spotting with BM --no pain or hemorrhoids  No cramping or abdominal pain  More pelvic fullness and occ pressure  Had normal prequel testing as well as FTS including US with MFM; will have Level II and fetal echo for AMA and history of heart issues for Sarita Regalado pre-eclampsia --forgot to start her ASA --will start now  Flu and covid vaccines today  Traveling to [place] in early December --discussed compression stockings and hydration  RTC 4wks --AFP at that time

## 2024-11-20 NOTE — PROGRESS NOTES
[facility]  Genetic Counseling Consult    Patient:  Cassandra Langley  Preferred Name: Cassandra YOB: 1990   Date of Service:  11/20/24   MRN: [id number]    Cassandra was seen at the [facility] for genetic consultation. The indication for genetic counseling is desire to discuss carrier screening . The patient was accompanied to this visit by their partner, Ty.    The session was conducted in English.      IMPRESSION/ PLAN   1. Cassandra had genetic screening earlier in this pregnancy. Their non-invasive prenatal test was screen negative or low risk for screened conditions     2. During today's [facility] visit, Cassandra had a genetic counseling session only. Cassandra has already had genetic testing in this pregnancy. Additional screening and diagnostic testing was discussed for the gestational age and declined.    3. Cassandra was previously seen for a genetic counseling consultation on 11/12/2024, but wanted to meet to today to discuss and consent for carrier screening with her partner, Ty. Ty and Cassandra's son was identified to have Hemoglobin E, discussed in the previous session. Please see the genetic counseling consultation note from 11/12/2024 for more information regarding genetic screening and risks to the pregnancy.        RISK ASSESSMENT FOR INHERITED CONDITIONS AND CARRIER SCREENING OPTIONS   Expanded carrier screening is available to screen for autosomal recessive conditions and X-linked conditions in a large list of genes. Carrier screening does not test the pregnancy but gives a risk assessment for the pregnancy and future pregnancies to have the condition. Expanded carrier screening is designed to identify carrier status for conditions that are primarily childhood or adolescent onset. Expanded carrier screening does not evaluate for adult-onset conditions such as hereditary cancer syndromes, dementia/  Alzheimer's disease, or cardiovascular disease risk factors. Additionally, expanded carrier screening is not comprehensive for all known genetic diseases or inherited conditions. Carrier screening does not test for all genetic and health conditions or risk factors.     Autosomal recessive conditions happen when a mutation has been inherited from the egg and sperm and include conditions like cystic fibrosis, thalassemia, hearing loss, spinal muscular atrophy, and more. We reviewed that when both biological parents carry a harmful genetic change in a gene associated with autosomal recessive inheritance, each of their pregnancies has a 1 in 4 (25%) chance to be affected by that condition. X-linked conditions happen when a mutation has been inherited from the egg and include conditions like fragile X syndrome.With x-linked conditions, the specific risk generally depends on the chromosomal sex of the fetus, with XY individuals (generally male) being most severely affected.     [place] screening was reviewed. About [place] Screening    Carrier Screening Consent  We discussed carrier screening can be done before or during apregnancy. The purpose of carrier screening is providing an individual or couple with a personalized risk assessment for genetic conditions. This is accomplished by screening an individual to determine if they are a carrierfor a genetic condition. For most conditions, both parents must be a carrier of the condition for the pregnancy to be at an increased risk. For other conditions that are X-linked, there is an increased risk when a female(mother) is a carrier and the concerns are greater if she passes that condition to a son.     Carrier screening is an option and a personal decision to pursue. If an individual or couple is interested or wishes to pursue carrier screening the following is discussed:  For most genetic conditions, carriers are healthy individuals. For autosomal recessive conditions (ex  cystic fibrosis, sickle cell anemia, etc), individuals have two copies of each gene. Carrier individuals have a mutation in one copy. For X-linked conditions, females have two copies of each gene and are considered carriers if one copy has amutation. Males only have one copy of an X-linked gene, therefore, if that one copy has a mutation they are affected by the condition, rather than only being a carrier    For select conditions, carriers can have symptoms, however, the chance is variable. Examples of these conditions include:  Female premutation carriers of fragile X syndrome can have symptoms in adulthood including premature ovarian failure and ataxia. If a female passes the mutation to a son they are at a high risk for fragile X syndrome, which is themost common genetic cause for autism spectrum disorder  Female or male carriers of Gaucher disease can have an increased chance for developing Parkinson's disease. Gaucher disease is a severe metabolic disorder.     If both parents are carriers of an autosomal recessive condition, there are three possible outcomes for each pregnancy/child: 25% unaffected, 50% carrier, and 25%affected. The only method to determine the outcome or diagnosis the condition in a pregnancy is an invasive testing option such as an amniocentesis. Some genetic conditions will have ultrasound findings during the pregnancybut many do not. Some couples will choose the diagnostic testing to plan for delivery or choose termination of an affected pregnancy. Other couples will choose to test for the condition after delivery. While these conditionscannot be cured or treated during the pregnancy it may guide management recommendations (ultrasounds) for pregnancy as well as delivery and infant care.     Cassandra and Paul wished to pursue carrier screening. The following was discussed as part of informed consent in addition to the above information:  We discussed [facility] and [facility] carrier screening which  is offered with several different panels. Cassandra and Paul choose the comprehensive panel which through [facility] includes 267 conditions (266 conditions for males).     Carrier screening is not meant to diagnose the patient with a condition, and generally carriers are asymptomatic. However, certain genes may confer increased risks for various health concerns in carriers such as fragile X syndrome, Duchene muscular dystrophy, and Gaucher disease among others.     We discussed that expanded carrier screening is designed to identify carrier status for conditions that are primarily childhood or adolescent onset. Expanded carrier screening does not evaluate for adult-onset conditions such as hereditary cancer syndromes, dementia/ Alzheimer's disease, or cardiovascular disease risk factors. Additionally, expanded carrier screening is not comprehensive for all known genetic diseases or inherited conditions. This is a screening test, and residual carrier status risk figures will be provided to the patient after results become available.  We discussed there are limitations such as current technology and rare chance of a false positive or negative.     [facility] laboratory will report on pseudodeficiency alleles, which are benign variants that are not known to be associated with disease and are not thought to impact the individuals risk to be a carrier for these conditions. However, the presence of pseudodeficiency alleles can exhibit false positive results on biochemical. Therefore, disclosing this information to patients may aid in interpretation of a  screen flag.     Results are typically available in 10-21 days. We will call Cassanrda with the results and the report will eventually be available via [facility]'s patient portal.     Recommendations will be made for partner testing, if the patient is found to be a carrier for any autosomal recessive conditions. MFM will facilitate in screening for the partner.     There are  implications for family members. If an individual is a carrier of a condition there is a chance for relatives to also be a carrier. This may be helpful information to disclose to family (siblings, cousins) so they may choose if they want to pursue carriers screening. In addition, if only one parent is found to be a carrier, there is a 50% chance for each child to be a carrier. This may be helpful information for the patient's children when they start a family.    We reviewed that there is a law in place, the Genetic Information Nondiscrimination Act ([place]), that protects patients from discrimination by health insurance companies and employers based on their genetic information. [place] does not protect against discrimination by life insurance companies or disability insurance.    Carrier screening is typically run through a lab called [facility] is not responsible for this billing, it is handled entirely by [facility].  With testing there are two billing options, insurance or self pay. If insurance billing is chosen, while the test is running, [facility] will process your insurance. They will initiate a prior authorization if needed. [facility] billing will reach out to you if the cost of the test is more than $249. You may hear from them after your test has already resulted. [facility] billing will assist you in exploring options to reduce the cost such as the patient assistance program.     The second option is [facility] self pay option. A genetic counselor will explain the difference between the standard and expanded option in more detail at an appointment with our [facility] clinic. If you choose self-pay, there is no patient assistance program to reduce the cost below $249. Because [facility] is responsible for the billing process, [facility] and/or genetic counselors have limited ability to help with determining the exact costs or best option for patients.     Occasionally, a patient and genetic counselor will  decide that testing through a different lab would be more appropriate for that patient's specific situation and the billing procedure and patient pay options would be different. This will be discussed in more detail with the genetic counselor at the time of ordering.        GENETIC TESTING OPTIONS   Genetic testing during a pregnancy includes screening and diagnostic procedures.      Screening tests are non-invasive which means no risk to the pregnancy and includes ultrasounds and blood work. The benefits and limitations of screening were reviewed. Screening tests provide a risk assessment (chance) specific to the pregnancy for certain fetal chromosome abnormalities but cannot definitively diagnose or exclude a fetal chromosome abnormality. Follow-up genetic counseling and consideration of diagnostic testing is recommended with any abnormal screening result. Diagnostic testing during a pregnancy is more certain and can test for more conditions. However, the tests do have a risk of miscarriage that requires careful consideration. These tests can detect fetal chromosome abnormalities with greater than 99% certainty. Results can be compromised by maternal cell contamination or mosaicism and are limited by the resolution of current genetic testing technology.     There is no screening or diagnostic test that detects all forms of birth defects or intellectual disability.     We discussed the following screening options:     Non-invasive prenatal testing (NIPT)  Also called cell-free DNA screening because it detects chromosomes from the placenta in the pregnant person's blood  Can be done any time after 10 weeks gestation  Standard recommendation for NIPT screens for trisomy 21, trisomy 18, trisomy 13, with the option of adding sex chromosome aneuploidies, without or without predicted sex  Cannot screen for open neural tube defects, maternal serum AFP after 15 weeks is recommended  New NIPT options include screening for  other trisomies, microdeletion syndromes, and in some cases fetal blood antigens. Guidelines do not recommend these conditions are included in standard screening. These options have limitations and should be discussed with a genetic counselor.   However, current (2023) ACMG guidelines do recommend that screening for one microdeletion syndrome, called 22q11.2 deletion syndrome be offered to all pregnant patients. 22q11.2 deletion syndrome has an estimated prevalence of 1 in 990 to 1 in 2148 (0.05-0.1%). Risk is not thought to increase with maternal age. Clinical features are variable but include congenital heart defects, cleft palate, developmental delays, immune system deficiencies, and hearing loss. Approximately 90% of cases are de johan (a sporadic new change in a pregnancy). Cell-free DNA screening for 22q11.2 deletion syndrome is available with the inclusion of other microdeletion syndromes. There is less data about the performance of cell-free DNA screening for more rare microdeletions and the chance for false positives or negative may be increased.  We discussed the limitations of cell-free DNA screening in detecting microdeletions and the possibility of false positives and false negatives. Microdeletion screening was not discussed and was not included in the NIPT ordered by Cassandra's primary OB.     We discussed the following ultrasound options:    Comprehensive level II ultrasound (Fetal Anatomy Ultrasound)  Ultrasound done between 18-20 weeks gestation  Screens for major birth defects and markers for aneuploidy (like trisomy 21 and trisomy 18)  Includes looking at the fetus/baby's growth, heart, organs (stomach, kidneys), placenta, and amniotic fluid    Fetal Echocardiogram  Ultrasound done between 22-24 weeks gestation  Screen for heart defects  Recommended if there are concerns about the heart or other indications like IVF pregnancy, maternal diabetes, or other ultrasound findings for concerns of a  fetal heart defect, such as a nuchal translucency greater than the 99th percentile in the first trimester     We discussed the following diagnostic options:     Amniocentesis  Invasive diagnostic procedure done after 15 weeks gestation  The procedure collects a small sample of amniotic fluid for the purpose of chromosomal testing and/or other genetic testing  Diagnostic result; more than 99% sensitivity for fetal chromosome abnormalities  Testing for AFP in the amniotic fluid can test for open neural tube defects    It was a pleasure to be involved with [facility]. Face-to-face time of the meeting was 30 minutes.    Karla Javier, ELLY, MS, [facility]  Board Certified and [place] Licensed Genetic Counselor  [facility]  Maternal Fetal Medicine  Office: [phone]  [facility]: [phone]   Fax: [phone]  [facility]

## 2024-12-10 LAB — SCANNED LAB RESULT: NORMAL

## 2024-12-18 ENCOUNTER — PRENATAL OFFICE VISIT (OUTPATIENT)
Dept: OBGYN | Facility: CLINIC | Age: 34
End: 2024-12-18
Payer: COMMERCIAL

## 2024-12-18 VITALS — SYSTOLIC BLOOD PRESSURE: 120 MMHG | DIASTOLIC BLOOD PRESSURE: 68 MMHG | WEIGHT: 163 LBS | BODY MASS INDEX: 29.81 KG/M2

## 2024-12-18 DIAGNOSIS — O09.529 SUPERVISION OF HIGH-RISK PREGNANCY OF ELDERLY MULTIGRAVIDA: Primary | ICD-10-CM

## 2024-12-18 DIAGNOSIS — O09.291 HISTORY OF INTRAUTERINE GROWTH RESTRICTION IN PRIOR PREGNANCY, CURRENTLY PREGNANT IN FIRST TRIMESTER: ICD-10-CM

## 2024-12-18 PROCEDURE — 82105 ALPHA-FETOPROTEIN SERUM: CPT | Mod: 90 | Performed by: OBSTETRICS & GYNECOLOGY

## 2024-12-18 PROCEDURE — 99207 PR PRENATAL VISIT: CPT | Performed by: OBSTETRICS & GYNECOLOGY

## 2024-12-18 PROCEDURE — 36415 COLL VENOUS BLD VENIPUNCTURE: CPT | Performed by: OBSTETRICS & GYNECOLOGY

## 2024-12-18 PROCEDURE — 99000 SPECIMEN HANDLING OFFICE-LAB: CPT | Performed by: OBSTETRICS & GYNECOLOGY

## 2024-12-18 NOTE — PROGRESS NOTES
Doing well today.  No concerns/issues  No cramping/lof  Had some bleeding after sex last week --bright red only for a short while and then some brown spotting  No bowel/bladder issues  Normal prequel testing as well as FTS; will check AFP today and has level II scheduled next month  RTC 4wks

## 2025-01-06 ENCOUNTER — HOSPITAL ENCOUNTER (OUTPATIENT)
Dept: ULTRASOUND IMAGING | Facility: CLINIC | Age: 35
Discharge: HOME | End: 2025-01-06
Attending: STUDENT IN AN ORGANIZED HEALTH CARE EDUCATION/TRAINING PROGRAM
Payer: COMMERCIAL

## 2025-01-06 ENCOUNTER — OFFICE VISIT (OUTPATIENT)
Dept: MATERNAL FETAL MEDICINE | Facility: CLINIC | Age: 35
End: 2025-01-06
Attending: STUDENT IN AN ORGANIZED HEALTH CARE EDUCATION/TRAINING PROGRAM
Payer: COMMERCIAL

## 2025-01-06 DIAGNOSIS — O09.512 PRIMIGRAVIDA OF ADVANCED MATERNAL AGE IN SECOND TRIMESTER: Primary | ICD-10-CM

## 2025-01-06 DIAGNOSIS — O26.90 PREGNANCY RELATED CONDITION, ANTEPARTUM: ICD-10-CM

## 2025-01-06 PROCEDURE — 76811 OB US DETAILED SNGL FETUS: CPT

## 2025-01-06 NOTE — NURSING NOTE
Cassandra seen in clinic today for L2 at 19w4d gestation due to AMA, hx prior child with VSD, hx prior pregnancy with FGR (see report/notes). Pt denies bldg/lof/change in discharge/contractions. Dr. Dumont met with pt and discussed POC. Plan to fetal echo due to prior child with VSD, scheduled 1/21/25. Pt discharged stable and ambulatory.     Ana Reyes RN

## 2025-01-21 ENCOUNTER — HOSPITAL ENCOUNTER (OUTPATIENT)
Dept: ULTRASOUND IMAGING | Facility: CLINIC | Age: 35
Discharge: HOME | End: 2025-01-21
Attending: OBSTETRICS & GYNECOLOGY
Payer: COMMERCIAL

## 2025-01-21 ENCOUNTER — OFFICE VISIT (OUTPATIENT)
Dept: MATERNAL FETAL MEDICINE | Facility: CLINIC | Age: 35
End: 2025-01-21
Attending: OBSTETRICS & GYNECOLOGY
Payer: COMMERCIAL

## 2025-01-21 DIAGNOSIS — Z82.79 FAMILY HISTORY OF VSD (VENTRICULAR SEPTAL DEFECT): ICD-10-CM

## 2025-01-21 DIAGNOSIS — O09.512 PRIMIGRAVIDA OF ADVANCED MATERNAL AGE IN SECOND TRIMESTER: Primary | ICD-10-CM

## 2025-01-21 PROBLEM — O09.529 SUPERVISION OF HIGH-RISK PREGNANCY OF ELDERLY MULTIGRAVIDA: Status: ACTIVE | Noted: 2024-10-17

## 2025-01-21 PROCEDURE — 93325 DOPPLER ECHO COLOR FLOW MAPG: CPT

## 2025-01-21 PROCEDURE — 76825 ECHO EXAM OF FETAL HEART: CPT

## 2025-01-21 NOTE — NURSING NOTE
Patient reports good fetal movement,  denies contractions, leaking of fluid, or bleeding.  SBAR given to MARCIA EDEN, see their note in Epic.

## 2025-01-21 NOTE — PROGRESS NOTES
Please refer to ultrasound report under 'Imaging' Studies of 'Chart Review' tabs.    Gopal Reyes M.D.

## 2025-01-28 ENCOUNTER — PRENATAL OFFICE VISIT (OUTPATIENT)
Dept: OBGYN | Facility: CLINIC | Age: 35
End: 2025-01-28
Payer: COMMERCIAL

## 2025-01-28 VITALS — BODY MASS INDEX: 30.54 KG/M2 | SYSTOLIC BLOOD PRESSURE: 122 MMHG | DIASTOLIC BLOOD PRESSURE: 78 MMHG | WEIGHT: 167 LBS

## 2025-01-28 DIAGNOSIS — O09.529 SUPERVISION OF HIGH-RISK PREGNANCY OF ELDERLY MULTIGRAVIDA: Primary | ICD-10-CM

## 2025-01-28 DIAGNOSIS — Z3A.22 22 WEEKS GESTATION OF PREGNANCY: ICD-10-CM

## 2025-01-28 DIAGNOSIS — O09.291 HISTORY OF INTRAUTERINE GROWTH RESTRICTION IN PRIOR PREGNANCY, CURRENTLY PREGNANT IN FIRST TRIMESTER: ICD-10-CM

## 2025-01-28 PROCEDURE — 99207 PR PRENATAL VISIT: CPT | Performed by: OBSTETRICS & GYNECOLOGY

## 2025-01-28 NOTE — PROGRESS NOTES
Doing well today.  +FM  No ctx/cramping/vb/spotting  Overall feeling pretty well --a bit more low back pain  Mild constipation --discussed hydration, fiber, etc  Had normal level II us and fetal echo with MFM; will check growth us with us at 28, 32 and 36wks due to hx FGR  RTC 4wks --glucola, CBC and Tdap at that time

## 2025-01-29 ENCOUNTER — MYC MEDICAL ADVICE (OUTPATIENT)
Dept: OBGYN | Facility: CLINIC | Age: 35
End: 2025-01-29
Payer: COMMERCIAL

## 2025-02-05 DIAGNOSIS — Z36.9 ENCOUNTER FOR ANTENATAL SCREENING: Primary | ICD-10-CM

## 2025-02-25 ENCOUNTER — PRENATAL OFFICE VISIT (OUTPATIENT)
Dept: OBGYN | Facility: CLINIC | Age: 35
End: 2025-02-25
Payer: COMMERCIAL

## 2025-02-25 ENCOUNTER — LAB (OUTPATIENT)
Dept: LAB | Facility: CLINIC | Age: 35
End: 2025-02-25
Payer: COMMERCIAL

## 2025-02-25 VITALS
WEIGHT: 169.8 LBS | DIASTOLIC BLOOD PRESSURE: 60 MMHG | SYSTOLIC BLOOD PRESSURE: 110 MMHG | BODY MASS INDEX: 31.25 KG/M2 | HEIGHT: 62 IN

## 2025-02-25 DIAGNOSIS — O09.529 SUPERVISION OF HIGH-RISK PREGNANCY OF ELDERLY MULTIGRAVIDA: Primary | ICD-10-CM

## 2025-02-25 DIAGNOSIS — Z23 NEED FOR TDAP VACCINATION: ICD-10-CM

## 2025-02-25 DIAGNOSIS — Z36.9 ENCOUNTER FOR ANTENATAL SCREENING: ICD-10-CM

## 2025-02-25 DIAGNOSIS — O09.292 HISTORY OF INTRAUTERINE GROWTH RESTRICTION IN PRIOR PREGNANCY, CURRENTLY PREGNANT, SECOND TRIMESTER: ICD-10-CM

## 2025-02-25 DIAGNOSIS — Z3A.26 26 WEEKS GESTATION OF PREGNANCY: ICD-10-CM

## 2025-02-25 LAB
ERYTHROCYTE [DISTWIDTH] IN BLOOD BY AUTOMATED COUNT: 13.9 % (ref 10–15)
GLUCOSE 1H P 50 G GLC PO SERPL-MCNC: 171 MG/DL (ref 70–129)
HCT VFR BLD AUTO: 39.5 % (ref 35–47)
HGB BLD-MCNC: 12.4 G/DL (ref 11.7–15.7)
MCH RBC QN AUTO: 27.2 PG (ref 26.5–33)
MCHC RBC AUTO-ENTMCNC: 31.4 G/DL (ref 31.5–36.5)
MCV RBC AUTO: 87 FL (ref 78–100)
PLATELET # BLD AUTO: 247 10E3/UL (ref 150–450)
RBC # BLD AUTO: 4.56 10E6/UL (ref 3.8–5.2)
WBC # BLD AUTO: 8.3 10E3/UL (ref 4–11)

## 2025-02-25 PROCEDURE — 99207 PR PRENATAL VISIT: CPT | Performed by: OBSTETRICS & GYNECOLOGY

## 2025-02-25 PROCEDURE — 36415 COLL VENOUS BLD VENIPUNCTURE: CPT

## 2025-02-25 PROCEDURE — 90471 IMMUNIZATION ADMIN: CPT | Performed by: OBSTETRICS & GYNECOLOGY

## 2025-02-25 PROCEDURE — 82950 GLUCOSE TEST: CPT

## 2025-02-25 PROCEDURE — 90715 TDAP VACCINE 7 YRS/> IM: CPT | Performed by: OBSTETRICS & GYNECOLOGY

## 2025-02-25 PROCEDURE — 85027 COMPLETE CBC AUTOMATED: CPT

## 2025-02-25 NOTE — PROGRESS NOTES
Doing well today.  +FM --very active  No ctx/cramping/vb/spotting  No bowel/bladder issues  Overall feeling well  Glucola, CBC and Tdap today  RTC 2wks --growth us at that time for hx FGR with first pregnancy

## 2025-03-03 ENCOUNTER — LAB (OUTPATIENT)
Dept: LAB | Facility: CLINIC | Age: 35
End: 2025-03-03
Payer: COMMERCIAL

## 2025-03-03 DIAGNOSIS — R73.09 IMPAIRED GLUCOSE TOLERANCE TEST: ICD-10-CM

## 2025-03-03 LAB
GESTATIONAL GTT 1 HR POST DOSE: 147 MG/DL (ref 60–179)
GESTATIONAL GTT 2 HR POST DOSE: 97 MG/DL (ref 60–154)
GESTATIONAL GTT 3 HR POST DOSE: 115 MG/DL (ref 60–139)
GLUCOSE P FAST SERPL-MCNC: 79 MG/DL (ref 60–94)

## 2025-03-03 PROCEDURE — 36415 COLL VENOUS BLD VENIPUNCTURE: CPT

## 2025-03-03 PROCEDURE — 82952 GTT-ADDED SAMPLES: CPT

## 2025-03-03 PROCEDURE — 82951 GLUCOSE TOLERANCE TEST (GTT): CPT

## 2025-03-18 ENCOUNTER — PRENATAL OFFICE VISIT (OUTPATIENT)
Dept: OBGYN | Facility: CLINIC | Age: 35
End: 2025-03-18
Attending: OBSTETRICS & GYNECOLOGY
Payer: COMMERCIAL

## 2025-03-18 ENCOUNTER — ANCILLARY PROCEDURE (OUTPATIENT)
Dept: ULTRASOUND IMAGING | Facility: CLINIC | Age: 35
End: 2025-03-18
Attending: OBSTETRICS & GYNECOLOGY
Payer: COMMERCIAL

## 2025-03-18 VITALS — SYSTOLIC BLOOD PRESSURE: 122 MMHG | WEIGHT: 169 LBS | DIASTOLIC BLOOD PRESSURE: 72 MMHG | BODY MASS INDEX: 30.91 KG/M2

## 2025-03-18 DIAGNOSIS — O09.291 HISTORY OF INTRAUTERINE GROWTH RESTRICTION IN PRIOR PREGNANCY, CURRENTLY PREGNANT IN FIRST TRIMESTER: ICD-10-CM

## 2025-03-18 DIAGNOSIS — O09.529 SUPERVISION OF HIGH-RISK PREGNANCY OF ELDERLY MULTIGRAVIDA: Primary | ICD-10-CM

## 2025-03-18 DIAGNOSIS — O09.293 HISTORY OF INTRAUTERINE GROWTH RESTRICTION IN PRIOR PREGNANCY, CURRENTLY PREGNANT, THIRD TRIMESTER: ICD-10-CM

## 2025-03-18 DIAGNOSIS — Z3A.29 29 WEEKS GESTATION OF PREGNANCY: ICD-10-CM

## 2025-03-18 PROCEDURE — 3074F SYST BP LT 130 MM HG: CPT | Performed by: OBSTETRICS & GYNECOLOGY

## 2025-03-18 PROCEDURE — 76816 OB US FOLLOW-UP PER FETUS: CPT | Performed by: OBSTETRICS & GYNECOLOGY

## 2025-03-18 PROCEDURE — 3078F DIAST BP <80 MM HG: CPT | Performed by: OBSTETRICS & GYNECOLOGY

## 2025-03-18 PROCEDURE — 99207 PR PRENATAL VISIT: CPT | Performed by: OBSTETRICS & GYNECOLOGY

## 2025-03-18 PROCEDURE — 0502F SUBSEQUENT PRENATAL CARE: CPT | Performed by: OBSTETRICS & GYNECOLOGY

## 2025-03-18 NOTE — PROGRESS NOTES
Doing well today.  +FM  More pressure and BH contractions this week; no vb/spotting/lof  Cervix today reassuring; closed and -3, soft; reassurance given  No bowel/bladder issues  Growth us today due to hx FGR with first; vtx, EFW 1491g/3-5# (47%ile) MVP 4.5cm  RTC 2wks --will repeat growth at 36wks as 32wks is too soon in relation to today's scan  PTL precautions reviewed

## 2025-03-22 ENCOUNTER — HEALTH MAINTENANCE LETTER (OUTPATIENT)
Age: 35
End: 2025-03-22

## 2025-04-01 ENCOUNTER — PRENATAL OFFICE VISIT (OUTPATIENT)
Dept: OBGYN | Facility: CLINIC | Age: 35
End: 2025-04-01
Payer: COMMERCIAL

## 2025-04-01 VITALS — DIASTOLIC BLOOD PRESSURE: 74 MMHG | BODY MASS INDEX: 30.91 KG/M2 | SYSTOLIC BLOOD PRESSURE: 110 MMHG | WEIGHT: 169 LBS

## 2025-04-01 DIAGNOSIS — Z3A.31 31 WEEKS GESTATION OF PREGNANCY: ICD-10-CM

## 2025-04-01 DIAGNOSIS — O09.293 HISTORY OF INTRAUTERINE GROWTH RESTRICTION IN PRIOR PREGNANCY, CURRENTLY PREGNANT, THIRD TRIMESTER: ICD-10-CM

## 2025-04-01 DIAGNOSIS — O09.529 SUPERVISION OF HIGH-RISK PREGNANCY OF ELDERLY MULTIGRAVIDA: Primary | ICD-10-CM

## 2025-04-01 PROCEDURE — 0502F SUBSEQUENT PRENATAL CARE: CPT | Performed by: OBSTETRICS & GYNECOLOGY

## 2025-04-01 PROCEDURE — 99207 PR PRENATAL VISIT: CPT | Performed by: OBSTETRICS & GYNECOLOGY

## 2025-04-01 PROCEDURE — 3078F DIAST BP <80 MM HG: CPT | Performed by: OBSTETRICS & GYNECOLOGY

## 2025-04-01 PROCEDURE — 3074F SYST BP LT 130 MM HG: CPT | Performed by: OBSTETRICS & GYNECOLOGY

## 2025-04-01 NOTE — PROGRESS NOTES
Doing well today.  +FM  Occ BH contractions; no vb/spotting/lof  Overall feeling well  Bowels a bit slower --will start stool softener to help  Normotensive; taking daily baby ASA due to hx pre-eclampsia  Hx FGR with first pregnancy --will repeat growth us at 36wks  RTC 2wks

## 2025-04-16 ENCOUNTER — PRENATAL OFFICE VISIT (OUTPATIENT)
Dept: OBGYN | Facility: CLINIC | Age: 35
End: 2025-04-16
Payer: COMMERCIAL

## 2025-04-16 VITALS — DIASTOLIC BLOOD PRESSURE: 74 MMHG | SYSTOLIC BLOOD PRESSURE: 120 MMHG | BODY MASS INDEX: 31.83 KG/M2 | WEIGHT: 174 LBS

## 2025-04-16 DIAGNOSIS — Z3A.33 33 WEEKS GESTATION OF PREGNANCY: ICD-10-CM

## 2025-04-16 DIAGNOSIS — O09.293 HISTORY OF INTRAUTERINE GROWTH RESTRICTION IN PRIOR PREGNANCY, CURRENTLY PREGNANT, THIRD TRIMESTER: ICD-10-CM

## 2025-04-16 DIAGNOSIS — O09.529 SUPERVISION OF HIGH-RISK PREGNANCY OF ELDERLY MULTIGRAVIDA: Primary | ICD-10-CM

## 2025-04-16 PROCEDURE — 3078F DIAST BP <80 MM HG: CPT | Performed by: OBSTETRICS & GYNECOLOGY

## 2025-04-16 PROCEDURE — 99207 PR PRENATAL VISIT: CPT | Performed by: OBSTETRICS & GYNECOLOGY

## 2025-04-16 PROCEDURE — 3074F SYST BP LT 130 MM HG: CPT | Performed by: OBSTETRICS & GYNECOLOGY

## 2025-04-16 PROCEDURE — 0502F SUBSEQUENT PRENATAL CARE: CPT | Performed by: OBSTETRICS & GYNECOLOGY

## 2025-04-16 NOTE — PROGRESS NOTES
Doing well today.  +FM  Occ BH contractions; no vb/spotting/lof  Overall feeling well  A bit more swelling; normotensive  Continue with daily baby ASA  Will repeat growth us next visit due to hx FGR with her first  RTC 2wks

## 2025-04-16 NOTE — PROGRESS NOTES
"Cassandra Langley is a 35 year old  at 33w6d   She is Dr. Costa's patient.     She {COMPLAINT:[id number]::\"had no complaints\"}.  Denies LOF, VB, ctx. +FM.      ICD-10-CM    1. Supervision of high-risk pregnancy of elderly multigravida  O09.529       2. History of intrauterine growth restriction in prior pregnancy, currently pregnant, third trimester  O09.293       3. 33 weeks gestation of pregnancy  Z3A.33         Additional history:   - First pregnancy complicated by large VSD and required transfer to [place] for pregnancy cares; son was also affected by FGR and Cassandra eventually developed pre-eclampsia with severe features and was delivered at 37+0wks.     - ***  - First tri labs wnl.   - NIPT normal/male; AFP normal  - Level 2/Anatomy US completed on 24 and was Normal.   - 3rd tri labs: due at 28 weeks  - GBS due at 36 weeks  - Repeat growth US at 36 weeks   - Reviewed Labor and PTL precautions, expected fetal movement and kick counts, PreE signs/symptoms  - TWG: ***lbs. Pregravid BMI 28. Expect 25-35 lbs.  - follow-up in 2 weeks     The patient was seen and discussed with Dr. Renee Shirley.     Tete Bill, MS3  [employer]   "

## 2025-04-29 ENCOUNTER — PRENATAL OFFICE VISIT (OUTPATIENT)
Dept: OBGYN | Facility: CLINIC | Age: 35
End: 2025-04-29
Attending: OBSTETRICS & GYNECOLOGY
Payer: COMMERCIAL

## 2025-04-29 ENCOUNTER — ANCILLARY PROCEDURE (OUTPATIENT)
Dept: ULTRASOUND IMAGING | Facility: CLINIC | Age: 35
End: 2025-04-29
Attending: OBSTETRICS & GYNECOLOGY
Payer: COMMERCIAL

## 2025-04-29 VITALS — SYSTOLIC BLOOD PRESSURE: 124 MMHG | WEIGHT: 175 LBS | BODY MASS INDEX: 32.01 KG/M2 | DIASTOLIC BLOOD PRESSURE: 86 MMHG

## 2025-04-29 DIAGNOSIS — O09.293 HISTORY OF INTRAUTERINE GROWTH RESTRICTION IN PRIOR PREGNANCY, CURRENTLY PREGNANT, THIRD TRIMESTER: ICD-10-CM

## 2025-04-29 DIAGNOSIS — O09.291 HISTORY OF INTRAUTERINE GROWTH RESTRICTION IN PRIOR PREGNANCY, CURRENTLY PREGNANT IN FIRST TRIMESTER: ICD-10-CM

## 2025-04-29 DIAGNOSIS — Z3A.35 35 WEEKS GESTATION OF PREGNANCY: ICD-10-CM

## 2025-04-29 DIAGNOSIS — O09.529 SUPERVISION OF HIGH-RISK PREGNANCY OF ELDERLY MULTIGRAVIDA: Primary | ICD-10-CM

## 2025-04-29 PROCEDURE — 87653 STREP B DNA AMP PROBE: CPT | Performed by: OBSTETRICS & GYNECOLOGY

## 2025-04-29 PROCEDURE — 3079F DIAST BP 80-89 MM HG: CPT | Performed by: OBSTETRICS & GYNECOLOGY

## 2025-04-29 PROCEDURE — 76816 OB US FOLLOW-UP PER FETUS: CPT | Performed by: OBSTETRICS & GYNECOLOGY

## 2025-04-29 PROCEDURE — 0502F SUBSEQUENT PRENATAL CARE: CPT | Performed by: OBSTETRICS & GYNECOLOGY

## 2025-04-29 PROCEDURE — 3074F SYST BP LT 130 MM HG: CPT | Performed by: OBSTETRICS & GYNECOLOGY

## 2025-04-29 PROCEDURE — 99207 PR PRENATAL VISIT: CPT | Performed by: OBSTETRICS & GYNECOLOGY

## 2025-04-29 NOTE — PROGRESS NOTES
Doing well today  +FM  No cramping/vb/spotting  More pressure and occ tightening but nothing painful or regular  No bowel/bladder issues  GBS collected today  Growth us today for hx FGR; vtx, EFW 2681g/5-15#, MVP 3.15cm  Labor precautions reviewed  RTC 1wk

## 2025-04-30 LAB — GP B STREP DNA SPEC QL NAA+PROBE: NEGATIVE

## 2025-05-06 ENCOUNTER — PRENATAL OFFICE VISIT (OUTPATIENT)
Dept: OBGYN | Facility: CLINIC | Age: 35
End: 2025-05-06
Payer: COMMERCIAL

## 2025-05-06 VITALS — SYSTOLIC BLOOD PRESSURE: 118 MMHG | DIASTOLIC BLOOD PRESSURE: 64 MMHG | BODY MASS INDEX: 32.19 KG/M2 | WEIGHT: 176 LBS

## 2025-05-06 DIAGNOSIS — O09.529 SUPERVISION OF HIGH-RISK PREGNANCY OF ELDERLY MULTIGRAVIDA: Primary | ICD-10-CM

## 2025-05-06 DIAGNOSIS — O09.293 HISTORY OF INTRAUTERINE GROWTH RESTRICTION IN PRIOR PREGNANCY, CURRENTLY PREGNANT, THIRD TRIMESTER: ICD-10-CM

## 2025-05-06 DIAGNOSIS — Z3A.36 36 WEEKS GESTATION OF PREGNANCY: ICD-10-CM

## 2025-05-06 PROCEDURE — 99207 PR PRENATAL VISIT: CPT | Performed by: OBSTETRICS & GYNECOLOGY

## 2025-05-06 PROCEDURE — 0502F SUBSEQUENT PRENATAL CARE: CPT | Performed by: OBSTETRICS & GYNECOLOGY

## 2025-05-06 PROCEDURE — 3078F DIAST BP <80 MM HG: CPT | Performed by: OBSTETRICS & GYNECOLOGY

## 2025-05-06 PROCEDURE — 3074F SYST BP LT 130 MM HG: CPT | Performed by: OBSTETRICS & GYNECOLOGY

## 2025-05-06 NOTE — LETTER
May 6, 2025      To Whom It May Concern;    I am writing on behalf of my patient Cassandra Langley (: 1990) to request that she be able to work from home for the remainder of her pregnancy.  Her expected due date is May 29, 2025.   Thank you for this consideration.      Sincerely,          Jes Foley MD        Electronically signed

## 2025-05-06 NOTE — PROGRESS NOTES
Doing well today.  +FM  No ctx/cramping/lof  Had some brownish discharge yesterday after more activity/walking  Continues to feel more pressure  GBS negative  Labor precautions reviewed  Letter written for work to work from home for remainder of preganncy  RTC 1wk

## 2025-05-08 ENCOUNTER — NURSE TRIAGE (OUTPATIENT)
Dept: NURSING | Facility: CLINIC | Age: 35
End: 2025-05-08
Payer: COMMERCIAL

## 2025-05-08 NOTE — TELEPHONE ENCOUNTER
OB Triage Call      Is patient's OB/Midwife with the [facility] or [facility]? [facility]- Proceed with triage     Reason for call: passed mucus plug    Assessment:   Passed mucus plug at 5:10 am, pinkish-dark brown discharge about the size of a pingpong ball.    Contractions are irregular  No vaginal bleeding  No fever  No dysuria  Has not performed fetal kick counts, pt not too worried about fetal movement  No ROM    Next prenatal visit on     Plan: Home care    Patient plans to deliver at [facility]    Patient's primary OB Provider is Og EDEN.      Per protocol recommendations Patient to follow home care recommendations.     REASSURANCE AND EDUCATION - BLOODY SHOW:  * Most women will notice a small amount of pink-colored, brownish, or blood-tinged mucus from the vagina near the end of the pregnancy.  * This is called 'bloody show'.   * It is caused by a small amount of bleeding from the cervix as the cervix thins out and begins to open up.   * This can happen 24 to 72 hours before labor begins, but more often it happens a week or two before labor begins.  * Here is some care advice that should help.    TREATMENT:  * No intercourse, douching, or use of tampons until you are seen by your doctor (or NP/PA).  * Use an unscented pad or panty liner.    CALL BACK IF:  * Vaginal bleeding or abdomen pain occur  * Decrease in fetal movement   * You become worse      Is patient's delivering hospital on divert? Does not apply due to Patient given home care instructions      37w0d    Estimated Date of Delivery: May 29, 2025        OB History    Para Term  AB Living   2 1 1 0 0 1   SAB IAB Ectopic Multiple Live Births   0 0 0 0 1      # Outcome Date GA Lbr Nato/2nd Weight Sex Type Anes PTL Lv   2 Current            1 Term 21 37w0d 02:31 / 00:15 2.25 kg (4 lb 15.4 oz) M Vag-Spont Nitrous N AISHA      Complications: Preeclampsia/Hypertension, IUGR (intrauterine growth restriction) affecting care of  "mother      Name: Sarita      Apgar1: 8  Apgar5: 9       No results found for: \"GBS\"       Millicent Hurtado RN     Reason for Disposition   [1] Pregnant 37 or more weeks (term) AND [2] pinkish or brownish mucous discharge    Additional Information   Negative: [1] Vaginal bleeding AND [2] pregnant 20 or more weeks   Negative: [1] Vaginal bleeding AND [2] pregnant < 20 weeks   Negative: [1] Having contractions or other symptoms of labor AND [2] < 37 weeks pregnant (i.e., )   Negative: [1] Having contractions or other symptoms of labor AND [2] 37 or more weeks pregnant (i.e., term pregnancy)   Negative: Leakage of fluid (trickle, gush) from the vagina   Negative: Foreign body in vagina (e.g., tampon)   Negative: Pain or burning with passing urine (urination) is main symptom   Negative: [1] Pregnant 23 or more weeks AND [2] baby is moving less today (e.g., kick count < 5 in 1 hour or < 10 in 2 hours)   Negative: Patient sounds very sick or weak to the triager   Negative: [1] Constant abdominal pain AND [2] present > 2 hours   Negative: [1] Intermittent lower abdominal pain AND [2] present > 24 hours   Negative: [1] Pregnant 24-36 weeks () AND [2] pinkish or brownish mucous discharge   Negative: [1] Yellow or green vaginal discharge AND [2] fever   Negative: Painful rash with tiny water blisters   Negative: [1] Rash (e.g., redness, tiny bumps, sore) of genital area AND [2] present > 24 hours   Negative: Bad smelling vaginal discharge   Negative: Abnormal color vaginal discharge (i.e., yellow, green, gray)   Negative: Tender lump (swelling or \"ball\") at vaginal opening   Negative: [1] Symptoms of a \"yeast infection\" (i.e., itchy, white discharge, not bad smelling) AND [2] not improved > 3 days following Care Advice   Negative: Patient is worried they have a sexually transmitted infection (STI)   Negative: Pain with sexual intercourse (dyspareunia)    Protocols used: Pregnancy - Vaginal [id number]    "

## 2025-05-11 ENCOUNTER — NURSE TRIAGE (OUTPATIENT)
Dept: NURSING | Facility: CLINIC | Age: 35
End: 2025-05-11
Payer: COMMERCIAL

## 2025-05-11 ENCOUNTER — HOSPITAL ENCOUNTER (INPATIENT)
Facility: CLINIC | Age: 35
LOS: 2 days | Discharge: HOME | End: 2025-05-13
Attending: OBSTETRICS & GYNECOLOGY | Admitting: OBSTETRICS & GYNECOLOGY
Payer: COMMERCIAL

## 2025-05-11 PROBLEM — O42.90 DELAYED DELIVERY AFTER SROM (SPONTANEOUS RUPTURE OF MEMBRANES): Status: ACTIVE | Noted: 2025-05-11

## 2025-05-11 PROBLEM — Z36.89 ENCOUNTER FOR TRIAGE IN PREGNANT PATIENT: Status: ACTIVE | Noted: 2025-05-11

## 2025-05-11 LAB
ABO + RH BLD: NORMAL
ALBUMIN MFR UR ELPH: 46 MG/DL
ALBUMIN SERPL BCG-MCNC: 3.7 G/DL (ref 3.5–5.2)
ALP SERPL-CCNC: 265 U/L (ref 40–150)
ALT SERPL W P-5'-P-CCNC: 12 U/L (ref 0–50)
ANION GAP SERPL CALCULATED.3IONS-SCNC: 14 MMOL/L (ref 7–15)
AST SERPL W P-5'-P-CCNC: 23 U/L (ref 0–45)
BILIRUB SERPL-MCNC: 0.2 MG/DL
BLD GP AB SCN SERPL QL: NEGATIVE
BUN SERPL-MCNC: 8.6 MG/DL (ref 6–20)
CALCIUM SERPL-MCNC: 9.9 MG/DL (ref 8.8–10.4)
CHLORIDE SERPL-SCNC: 100 MMOL/L (ref 98–107)
CREAT SERPL-MCNC: 0.52 MG/DL (ref 0.51–0.95)
CREAT UR-MCNC: 115.2 MG/DL
EGFRCR SERPLBLD CKD-EPI 2021: >90 ML/MIN/1.73M2
ERYTHROCYTE [DISTWIDTH] IN BLOOD BY AUTOMATED COUNT: 14.3 % (ref 10–15)
GLUCOSE SERPL-MCNC: 92 MG/DL (ref 70–99)
HCO3 SERPL-SCNC: 20 MMOL/L (ref 22–29)
HCT VFR BLD AUTO: 43.6 % (ref 35–47)
HGB BLD-MCNC: 14.7 G/DL (ref 11.7–15.7)
MCH RBC QN AUTO: 28 PG (ref 26.5–33)
MCHC RBC AUTO-ENTMCNC: 33.7 G/DL (ref 31.5–36.5)
MCV RBC AUTO: 83 FL (ref 78–100)
PLATELET # BLD AUTO: 256 10E3/UL (ref 150–450)
POTASSIUM SERPL-SCNC: 4 MMOL/L (ref 3.4–5.3)
PROT SERPL-MCNC: 7.6 G/DL (ref 6.4–8.3)
PROT/CREAT 24H UR: 0.4 MG/MG CR (ref 0–0.2)
RBC # BLD AUTO: 5.25 10E6/UL (ref 3.8–5.2)
RUPTURE OF FETAL MEMBRANES BY ROM PLUS: POSITIVE
SODIUM SERPL-SCNC: 134 MMOL/L (ref 135–145)
SPECIMEN EXP DATE BLD: NORMAL
T PALLIDUM AB SER QL: NONREACTIVE
WBC # BLD AUTO: 9.4 10E3/UL (ref 4–11)

## 2025-05-11 PROCEDURE — 250N000009 HC RX 250: Performed by: OBSTETRICS & GYNECOLOGY

## 2025-05-11 PROCEDURE — 258N000003 HC RX IP 258 OP 636: Performed by: OBSTETRICS & GYNECOLOGY

## 2025-05-11 PROCEDURE — 0KQM0ZZ REPAIR PERINEUM MUSCLE, OPEN APPROACH: ICD-10-PCS | Performed by: OBSTETRICS & GYNECOLOGY

## 2025-05-11 PROCEDURE — 86780 TREPONEMA PALLIDUM: CPT | Performed by: OBSTETRICS & GYNECOLOGY

## 2025-05-11 PROCEDURE — 84156 ASSAY OF PROTEIN URINE: CPT | Performed by: OBSTETRICS & GYNECOLOGY

## 2025-05-11 PROCEDURE — 80053 COMPREHEN METABOLIC PANEL: CPT | Performed by: OBSTETRICS & GYNECOLOGY

## 2025-05-11 PROCEDURE — 250N000011 HC RX IP 250 OP 636: Mod: JZ | Performed by: OBSTETRICS & GYNECOLOGY

## 2025-05-11 PROCEDURE — 85027 COMPLETE CBC AUTOMATED: CPT | Performed by: OBSTETRICS & GYNECOLOGY

## 2025-05-11 PROCEDURE — 250N000011 HC RX IP 250 OP 636: Performed by: OBSTETRICS & GYNECOLOGY

## 2025-05-11 PROCEDURE — 36415 COLL VENOUS BLD VENIPUNCTURE: CPT | Performed by: OBSTETRICS & GYNECOLOGY

## 2025-05-11 PROCEDURE — 722N000001 HC LABOR CARE VAGINAL DELIVERY SINGLE

## 2025-05-11 PROCEDURE — 84112 EVAL AMNIOTIC FLUID PROTEIN: CPT | Performed by: OBSTETRICS & GYNECOLOGY

## 2025-05-11 PROCEDURE — 59400 OBSTETRICAL CARE: CPT | Performed by: OBSTETRICS & GYNECOLOGY

## 2025-05-11 PROCEDURE — 10D17Z9 MANUAL EXTRACTION OF PRODUCTS OF CONCEPTION, RETAINED, VIA NATURAL OR ARTIFICIAL OPENING: ICD-10-PCS | Performed by: OBSTETRICS & GYNECOLOGY

## 2025-05-11 PROCEDURE — 86900 BLOOD TYPING SEROLOGIC ABO: CPT | Performed by: OBSTETRICS & GYNECOLOGY

## 2025-05-11 PROCEDURE — G0463 HOSPITAL OUTPT CLINIC VISIT: HCPCS

## 2025-05-11 PROCEDURE — 250N000013 HC RX MED GY IP 250 OP 250 PS 637: Performed by: OBSTETRICS & GYNECOLOGY

## 2025-05-11 PROCEDURE — 120N000012 HC R&B POSTPARTUM

## 2025-05-11 RX ORDER — MISOPROSTOL 200 UG/1
400 TABLET ORAL
Status: DISCONTINUED | OUTPATIENT
Start: 2025-05-11 | End: 2025-05-11 | Stop reason: HOSPADM

## 2025-05-11 RX ORDER — OXYTOCIN 10 [USP'U]/ML
10 INJECTION, SOLUTION INTRAMUSCULAR; INTRAVENOUS
Status: DISCONTINUED | OUTPATIENT
Start: 2025-05-11 | End: 2025-05-11

## 2025-05-11 RX ORDER — TRANEXAMIC ACID 10 MG/ML
1 INJECTION, SOLUTION INTRAVENOUS EVERY 30 MIN PRN
Status: DISCONTINUED | OUTPATIENT
Start: 2025-05-11 | End: 2025-05-13 | Stop reason: HOSPADM

## 2025-05-11 RX ORDER — METHYLERGONOVINE MALEATE 0.2 MG/ML
200 INJECTION INTRAVENOUS
Status: DISCONTINUED | OUTPATIENT
Start: 2025-05-11 | End: 2025-05-11 | Stop reason: HOSPADM

## 2025-05-11 RX ORDER — OXYTOCIN/0.9 % SODIUM CHLORIDE 30/500 ML
100-340 PLASTIC BAG, INJECTION (ML) INTRAVENOUS CONTINUOUS PRN
Status: DISCONTINUED | OUTPATIENT
Start: 2025-05-11 | End: 2025-05-11

## 2025-05-11 RX ORDER — KETOROLAC TROMETHAMINE 15 MG/ML
15 INJECTION, SOLUTION INTRAMUSCULAR; INTRAVENOUS
Status: DISCONTINUED | OUTPATIENT
Start: 2025-05-11 | End: 2025-05-11

## 2025-05-11 RX ORDER — PROCHLORPERAZINE MALEATE 5 MG/1
10 TABLET ORAL EVERY 6 HOURS PRN
Status: DISCONTINUED | OUTPATIENT
Start: 2025-05-11 | End: 2025-05-11 | Stop reason: HOSPADM

## 2025-05-11 RX ORDER — OXYTOCIN/0.9 % SODIUM CHLORIDE 30/500 ML
340 PLASTIC BAG, INJECTION (ML) INTRAVENOUS CONTINUOUS PRN
Status: DISCONTINUED | OUTPATIENT
Start: 2025-05-11 | End: 2025-05-11 | Stop reason: HOSPADM

## 2025-05-11 RX ORDER — CARBOPROST TROMETHAMINE 250 UG/ML
250 INJECTION, SOLUTION INTRAMUSCULAR
Status: DISCONTINUED | OUTPATIENT
Start: 2025-05-11 | End: 2025-05-13 | Stop reason: HOSPADM

## 2025-05-11 RX ORDER — LIDOCAINE 40 MG/G
CREAM TOPICAL
Status: DISCONTINUED | OUTPATIENT
Start: 2025-05-11 | End: 2025-05-11

## 2025-05-11 RX ORDER — LOPERAMIDE HYDROCHLORIDE 2 MG/1
2 CAPSULE ORAL
Status: DISCONTINUED | OUTPATIENT
Start: 2025-05-11 | End: 2025-05-13 | Stop reason: HOSPADM

## 2025-05-11 RX ORDER — METOCLOPRAMIDE HYDROCHLORIDE 5 MG/ML
10 INJECTION INTRAMUSCULAR; INTRAVENOUS EVERY 6 HOURS PRN
Status: DISCONTINUED | OUTPATIENT
Start: 2025-05-11 | End: 2025-05-11 | Stop reason: HOSPADM

## 2025-05-11 RX ORDER — ONDANSETRON 2 MG/ML
4 INJECTION INTRAMUSCULAR; INTRAVENOUS EVERY 6 HOURS PRN
Status: DISCONTINUED | OUTPATIENT
Start: 2025-05-11 | End: 2025-05-11 | Stop reason: HOSPADM

## 2025-05-11 RX ORDER — ACETAMINOPHEN 325 MG/1
650 TABLET ORAL EVERY 4 HOURS PRN
Status: DISCONTINUED | OUTPATIENT
Start: 2025-05-11 | End: 2025-05-13 | Stop reason: HOSPADM

## 2025-05-11 RX ORDER — OXYTOCIN 10 [USP'U]/ML
10 INJECTION, SOLUTION INTRAMUSCULAR; INTRAVENOUS
Status: DISCONTINUED | OUTPATIENT
Start: 2025-05-11 | End: 2025-05-11 | Stop reason: HOSPADM

## 2025-05-11 RX ORDER — BISACODYL 10 MG
10 SUPPOSITORY, RECTAL RECTAL DAILY PRN
Status: DISCONTINUED | OUTPATIENT
Start: 2025-05-11 | End: 2025-05-13 | Stop reason: HOSPADM

## 2025-05-11 RX ORDER — AMOXICILLIN 250 MG
2 CAPSULE ORAL
Status: DISCONTINUED | OUTPATIENT
Start: 2025-05-11 | End: 2025-05-13 | Stop reason: HOSPADM

## 2025-05-11 RX ORDER — FENTANYL CITRATE 50 UG/ML
100 INJECTION, SOLUTION INTRAMUSCULAR; INTRAVENOUS ONCE
Status: COMPLETED | OUTPATIENT
Start: 2025-05-11 | End: 2025-05-11

## 2025-05-11 RX ORDER — METHYLERGONOVINE MALEATE 0.2 MG/ML
200 INJECTION INTRAVENOUS
Status: DISCONTINUED | OUTPATIENT
Start: 2025-05-11 | End: 2025-05-13 | Stop reason: HOSPADM

## 2025-05-11 RX ORDER — CLINDAMYCIN PHOSPHATE 900 MG/50ML
900 INJECTION, SOLUTION INTRAVENOUS ONCE
Status: COMPLETED | OUTPATIENT
Start: 2025-05-11 | End: 2025-05-11

## 2025-05-11 RX ORDER — ONDANSETRON 4 MG/1
4 TABLET, ORALLY DISINTEGRATING ORAL EVERY 6 HOURS PRN
Status: DISCONTINUED | OUTPATIENT
Start: 2025-05-11 | End: 2025-05-11 | Stop reason: HOSPADM

## 2025-05-11 RX ORDER — MISOPROSTOL 200 UG/1
400 TABLET ORAL
Status: DISCONTINUED | OUTPATIENT
Start: 2025-05-11 | End: 2025-05-13 | Stop reason: HOSPADM

## 2025-05-11 RX ORDER — LOPERAMIDE HYDROCHLORIDE 2 MG/1
4 CAPSULE ORAL
Status: DISCONTINUED | OUTPATIENT
Start: 2025-05-11 | End: 2025-05-13 | Stop reason: HOSPADM

## 2025-05-11 RX ORDER — CITRIC ACID/SODIUM CITRATE 334-500MG
30 SOLUTION, ORAL ORAL
Status: DISCONTINUED | OUTPATIENT
Start: 2025-05-11 | End: 2025-05-11 | Stop reason: HOSPADM

## 2025-05-11 RX ORDER — HYDRALAZINE HYDROCHLORIDE 20 MG/ML
10 INJECTION INTRAMUSCULAR; INTRAVENOUS
Status: DISCONTINUED | OUTPATIENT
Start: 2025-05-11 | End: 2025-05-13 | Stop reason: HOSPADM

## 2025-05-11 RX ORDER — POLYETHYLENE GLYCOL 3350 17 G/17G
17 POWDER, FOR SOLUTION ORAL DAILY PRN
Status: DISCONTINUED | OUTPATIENT
Start: 2025-05-11 | End: 2025-05-13 | Stop reason: HOSPADM

## 2025-05-11 RX ORDER — MISOPROSTOL 200 UG/1
800 TABLET ORAL
Status: DISCONTINUED | OUTPATIENT
Start: 2025-05-11 | End: 2025-05-11 | Stop reason: HOSPADM

## 2025-05-11 RX ORDER — LOPERAMIDE HYDROCHLORIDE 2 MG/1
2 CAPSULE ORAL
Status: DISCONTINUED | OUTPATIENT
Start: 2025-05-11 | End: 2025-05-11 | Stop reason: HOSPADM

## 2025-05-11 RX ORDER — LOPERAMIDE HYDROCHLORIDE 2 MG/1
4 CAPSULE ORAL
Status: DISCONTINUED | OUTPATIENT
Start: 2025-05-11 | End: 2025-05-11 | Stop reason: HOSPADM

## 2025-05-11 RX ORDER — TERBUTALINE SULFATE 1 MG/ML
0.25 INJECTION SUBCUTANEOUS
Status: DISCONTINUED | OUTPATIENT
Start: 2025-05-11 | End: 2025-05-11 | Stop reason: HOSPADM

## 2025-05-11 RX ORDER — OXYTOCIN/0.9 % SODIUM CHLORIDE 30/500 ML
340 PLASTIC BAG, INJECTION (ML) INTRAVENOUS CONTINUOUS PRN
Status: DISCONTINUED | OUTPATIENT
Start: 2025-05-11 | End: 2025-05-13 | Stop reason: HOSPADM

## 2025-05-11 RX ORDER — IBUPROFEN 400 MG/1
800 TABLET, FILM COATED ORAL
Status: DISCONTINUED | OUTPATIENT
Start: 2025-05-11 | End: 2025-05-11

## 2025-05-11 RX ORDER — CARBOPROST TROMETHAMINE 250 UG/ML
250 INJECTION, SOLUTION INTRAMUSCULAR
Status: DISCONTINUED | OUTPATIENT
Start: 2025-05-11 | End: 2025-05-11 | Stop reason: HOSPADM

## 2025-05-11 RX ORDER — IBUPROFEN 400 MG/1
800 TABLET, FILM COATED ORAL EVERY 6 HOURS PRN
Status: DISCONTINUED | OUTPATIENT
Start: 2025-05-11 | End: 2025-05-13 | Stop reason: HOSPADM

## 2025-05-11 RX ORDER — MISOPROSTOL 200 UG/1
800 TABLET ORAL
Status: DISCONTINUED | OUTPATIENT
Start: 2025-05-11 | End: 2025-05-13 | Stop reason: HOSPADM

## 2025-05-11 RX ORDER — METOCLOPRAMIDE 10 MG/1
10 TABLET ORAL EVERY 6 HOURS PRN
Status: DISCONTINUED | OUTPATIENT
Start: 2025-05-11 | End: 2025-05-11 | Stop reason: HOSPADM

## 2025-05-11 RX ORDER — HYDROCORTISONE 25 MG/G
CREAM TOPICAL 3 TIMES DAILY PRN
Status: DISCONTINUED | OUTPATIENT
Start: 2025-05-11 | End: 2025-05-13 | Stop reason: HOSPADM

## 2025-05-11 RX ORDER — TRANEXAMIC ACID 10 MG/ML
1 INJECTION, SOLUTION INTRAVENOUS EVERY 30 MIN PRN
Status: DISCONTINUED | OUTPATIENT
Start: 2025-05-11 | End: 2025-05-11 | Stop reason: HOSPADM

## 2025-05-11 RX ORDER — PRENATAL VIT/IRON FUM/FOLIC AC 27MG-0.8MG
1 TABLET ORAL DAILY
Status: DISCONTINUED | OUTPATIENT
Start: 2025-05-11 | End: 2025-05-13 | Stop reason: HOSPADM

## 2025-05-11 RX ORDER — FENTANYL CITRATE 50 UG/ML
INJECTION, SOLUTION INTRAMUSCULAR; INTRAVENOUS
Status: COMPLETED
Start: 2025-05-11 | End: 2025-05-11

## 2025-05-11 RX ORDER — OXYTOCIN 10 [USP'U]/ML
10 INJECTION, SOLUTION INTRAMUSCULAR; INTRAVENOUS
Status: DISCONTINUED | OUTPATIENT
Start: 2025-05-11 | End: 2025-05-13 | Stop reason: HOSPADM

## 2025-05-11 RX ORDER — LABETALOL HYDROCHLORIDE 5 MG/ML
20-80 INJECTION, SOLUTION INTRAVENOUS EVERY 10 MIN PRN
Status: DISCONTINUED | OUTPATIENT
Start: 2025-05-11 | End: 2025-05-13 | Stop reason: HOSPADM

## 2025-05-11 RX ADMIN — IBUPROFEN 800 MG: 400 TABLET, FILM COATED ORAL at 20:00

## 2025-05-11 RX ADMIN — ACETAMINOPHEN 650 MG: 325 TABLET, FILM COATED ORAL at 13:24

## 2025-05-11 RX ADMIN — FENTANYL CITRATE 100 MCG: 50 INJECTION, SOLUTION INTRAMUSCULAR; INTRAVENOUS at 10:49

## 2025-05-11 RX ADMIN — ACETAMINOPHEN 650 MG: 325 TABLET, FILM COATED ORAL at 20:00

## 2025-05-11 RX ADMIN — IBUPROFEN 800 MG: 400 TABLET, FILM COATED ORAL at 13:25

## 2025-05-11 RX ADMIN — GENTAMICIN SULFATE 160 MG: 40 INJECTION, SOLUTION INTRAMUSCULAR; INTRAVENOUS at 11:47

## 2025-05-11 RX ADMIN — Medication 340 ML/HR: at 10:20

## 2025-05-11 RX ADMIN — LIDOCAINE HYDROCHLORIDE 20 ML: 10 INJECTION, SOLUTION INFILTRATION; PERINEURAL at 10:22

## 2025-05-11 RX ADMIN — CLINDAMYCIN PHOSPHATE 900 MG: 900 INJECTION, SOLUTION INTRAVENOUS at 12:20

## 2025-05-11 RX ADMIN — METHYLCELLULOSE 1000 MG: 500 TABLET ORAL at 13:24

## 2025-05-11 ASSESSMENT — ACTIVITIES OF DAILY LIVING (ADL)
ADLS_ACUITY_SCORE: 23
ADLS_ACUITY_SCORE: 42
ADLS_ACUITY_SCORE: 42
ADLS_ACUITY_SCORE: 23
ADLS_ACUITY_SCORE: 23
ADLS_ACUITY_SCORE: 19
ADLS_ACUITY_SCORE: 19
ADLS_ACUITY_SCORE: 42
ADLS_ACUITY_SCORE: 23
ADLS_ACUITY_SCORE: 19

## 2025-05-11 NOTE — L&D DELIVERY NOTE
VAGINAL DELIVERY PROCEDURE NOTE    PREOPERATIVE DIAGNOSIS:  1. Intrauterine pregnancy at 37w3d  2. Prelabor rupture of membranes with subsequent spontaneous onset of labor  3. AMA  4. Hx preeclampsia w/o SF  5. Hx FGR in previous pregnancy    POSTOPERATIVE DIAGNOSIS:   1. Status post spontaneous vaginal delivery  2. 2nd degree laceration  3. Retained placenta    PROCEDURE DATE: May 11, 2025    PROCEDURE:   1. Spontaneous vaginal delivery  2. 2nd degree Laceration repair  3. Manual extraction of retained placenta    STAFF SURGEON: Maria Elena Perez MD     ANESTHESIA: 100 Fentanyl after delivery for manual extraction. 1% lidocaine for repair (7 ml)    COMPLICATIONS: Retained placenta    ESTIMATED BLOOD LOSS: 300 mL.     SPECIMENS: Cord blood    FINDINGS:  Male infant weighing 2.7 kg. Apgar scores of 8 and 9.  Delivered in CATALINA presentation. Nuchal x1 reduced after delivery of the fetal head. Placenta with 3 vessel cord that failed to delivery after 30 minutes. On subsequent manual extraction, portion of placenta adherent to the fundus and placenta was removed after 2nd pass. A third pass confirmed to residual placental tissue palpated and no obvious retained potion on specimen review . Meconium: absent. Shallow second degree laceration (3x2x1 cm).    INDICATIONS:  This is a 35 year old  with intrauterine pregnancy at 37w3d who presented to Labor and Delivery after PROM of clear fluid at 04:45 AM. She was painfully makenzie on admission after rupture and found to be 2 cm. She made change without augmentation relatively quickly and was found to be complete at 10:05 with delivery at 10:15 AM.    PROCEDURE:  The patient was complete and pushing. Infant's head was delivered atraumatically. Nuchal cord present and reduced at perineum. Anterior shoulder and posterior shoulders were easily delivered without problems followed by remainder of infant. Infant vigorous and crying. Delayed cord clamping was performed prior  to cutting. Cord bl;ood was obtained. Pitocin in IV fluid was administered per protocol. Despite 30 minutes of gentle cord traction and maternal pushing efforts, the placenta failed to deliver. Thus, after discussion being an unmedicated birth, patient was administered 100 mcg of fentanyl and consented to manual extraction of the placenta with the above findings.  The placenta was inspected with no obvious retained portions and with a 3 vessel cord. Uterine massage was performed and the fundus was found to be firm. Vagina, cervix, and perineum were inspected for lacerations. 2nd degree laceration was noted and repaired with 3-0 Vicryl. All counts were correct. Mom and baby stable in room.    I discussed the findings of retained placenta and postpartum signs/symptoms to contact us. Gent/Clinda administered once (due to cephalosporin allergy) for procedure prophylaxis.     Primary OB: MD Maria Elena Nick MD  11:30 AM  May 11, 2025

## 2025-05-11 NOTE — PROVIDER NOTIFICATION
"   25 0715   Provider Notification   Provider Name/Title Dr. Murphy   Method of Notification Electronic Page   Notification Reason Status Update     Page to MD as follows:  \"C. Somtakoune  37w3d GA here for R/O ROM. Hx quick labor with G1 per pt. ROM + positive, ROM clear fluid at 0445. SVE 2.5/60-70%/-2 cephalic. Cat 1 EFM. Ctxs irregular. Initial /100. Pt has hx GHTN with G1. Denies feature, patellar reflexes +2 no clonus. Repeat BPs WNL systolic 130s-140s/80s-90s.\"  "

## 2025-05-11 NOTE — H&P
Admission Note:    No significant change in general health status based on examination of the patient, review of Nursing Admission Database and prenatal record.  34yo  @ 37+3wk presenting with SROM at 4:45am, SVE 2cm. BPs elevated in triage 161/100, 145/94, 138/96. Hx of GHTN with first pregnancy.  History fast labors  Plans to go unmedicated  Pregnancy c/b AMA with negative NIPT and level II.     PreE labs ordered. HTN orderset placed.    Primary OB: Og Milan Masters, DO  May 11, 2025     Alert-The patient is alert, awake and responds to voice. The patient is oriented to time, place, and person. The triage nurse is able to obtain subjective information.

## 2025-05-11 NOTE — TELEPHONE ENCOUNTER
"OB Triage Call      Is patient's OB/Midwife with the [facility] or [facility]? [facility]- Proceed with triage     Reason for call: water broke at 0445    Assessment: 37w, 3D no contractions at this time    Plan: proceed to [facility]     Patient plans to deliver at [facility]    Patient's primary OB Provider is DIEGO Foley      Per protocol recommendations Patient to be evaluated in L&D. Patient's primary OB is [facility].  Labor and delivery at [facility] ([phone]) notified of patient's pending arrival.  Report given to ANNIA Ryder.      Is patient's delivering hospital on divert? No      37w3d    Estimated Date of Delivery: May 29, 2025        OB History    Para Term  AB Living   2 1 1 0 0 1   SAB IAB Ectopic Multiple Live Births   0 0 0 0 1      # Outcome Date GA Lbr Nato/2nd Weight Sex Type Anes PTL Lv   2 Current            1 Term 21 37w0d 02:31 / 00:15 2.25 kg (4 lb 15.4 oz) M Vag-Spont Nitrous N AISHA      Complications: Preeclampsia/Hypertension, IUGR (intrauterine growth restriction) affecting care of mother      Name: Sarita      Apgar1: 8  Apgar5: 9       No results found for: \"GBS\"       Jaycee Harris RN   Reason for Disposition   Leakage of fluid from vagina    Additional Information   Negative: Passed out (i.e., lost consciousness, collapsed and was not responding)   Negative: Shock suspected (e.g., cold/pale/clammy skin, too weak to stand, low BP, rapid pulse)   Negative: Difficult to awaken or acting confused (e.g., disoriented, slurred speech)   Negative: [1] SEVERE abdominal pain (e.g., excruciating) AND [2] constant AND [3] present > 1 hour   Negative: SEVERE bleeding (e.g., continuous red blood from vagina, or large blood clots)   Negative: Umbilical cord hanging out of the vagina (shiny, white, curled appearance, \"like telephone cord\")   Negative: Uncontrollable urge to push (i.e., feels like baby is coming out now)   Negative: Can see baby   Negative: Sounds " like a life-threatening emergency to the triager   Negative: Pregnant < 37 weeks (i.e., )   Negative: [1] Uncertain delivery date AND [2] possibly pregnant < 37 weeks (i.e., )   Negative: [1] History of prior delivery (multipara) AND [2] contractions < 10 minutes apart AND [3] present 1 hour   Negative: [1] History of rapid prior delivery AND [2] contractions < 10 minutes apart   Negative: [1] Leakage of fluid from vagina AND [2] green or brown in color   Negative: [1] First baby (primipara) AND [2] contractions < 6 minutes apart  AND [3] present 2 hours    Protocols used: Pregnancy - Labor-A-AH

## 2025-05-11 NOTE — PROGRESS NOTES
Data: Patient admitted to room 214 at 0720. Patient is a . Prenatal record reviewed.   OB History    Para Term  AB Living   2 1 1 0 0 1   SAB IAB Ectopic Multiple Live Births   0 0 0 0 1      # Outcome Date GA Lbr Nato/2nd Weight Sex Type Anes PTL Lv   2 Current            1 Term 21 37w0d 02:31 / 00:15 2.25 kg (4 lb 15.4 oz) M Vag-Spont Nitrous N AISHA      Complications: Preeclampsia/Hypertension, IUGR (intrauterine growth restriction) affecting care of mother      Name: Sarita      Apgar1: 8  Apgar5: 9   .  Medical History:   Past Medical History:   Diagnosis Date    Hypertension     Oral contraceptive use     stopped 2024    Vulvovaginitis    .  Gestational age 37w3d. Vital signs per doc flowsheet. Fetal movement present. Patient reports Rule out rupture of membranes   as reason for admission. Support persons Ty present.  Action: Report from Britni DOBBS RN obtained at 0720. Verbal consent for EFM, external fetal monitors applied. Admission assessment completed. Patient and support persons educated on labor process. Patient instructed to report change in fetal movement, contractions, vaginal leaking of fluid or bleeding, abdominal pain, or any concerns related to the pregnancy to her nurse/physician. Patient oriented to room, call light in reach.   Response: Plan per provider is expectant management. Patient verbalized understanding of education and verbalized agreement with plan.

## 2025-05-11 NOTE — PLAN OF CARE
Vital signs stable, BP WDL. Pt denies headache, epigastric pain or visual disturbances at this time.  Postpartum assessment WDL. Pain controlled with tylenol, ibuprofen, ice packs and tucks. Patient voiding without difficulty. Breastfeeding on cue with minimal assist, also using shield with DM/feeding tube to supp at the breast as she is concerned about a repeat of delayed milk coming in.  Pt was enc to pump and lactation will follow up. Patient and infant bonding well. Will continue with current plan of care.

## 2025-05-11 NOTE — PROGRESS NOTES
Patient  37w3d GA presents to MAC in stable ambulatory condition at 0606 with reports of possible LOF at 0445. Patient reports contractions every 5-10 minutes. Patient denies rosa red vaginal bleeding. Patient reports positive fetal movement.     EFM applied, SVE performed, and ROM plus collected with patient verbal consent. All questions answered with initial MAC assessment.    Report to Diana LENZ RN at 0715. Patient transferred to labor room 214 at this time.

## 2025-05-11 NOTE — PROGRESS NOTES
Data: Cassandra Langley transferred to 426 via wheelchair at 1255. Baby transferred via parent's arms.  Action: Receiving unit notified of transfer: Yes. Patient and family notified of room change. Report given to Uyen MEDRANO at 1300. Belongings sent to receiving unit. Accompanied by Registered Nurse. Oriented patient to surroundings. Call light within reach. ID bands double-checked with receiving RN.  Response: Patient tolerated transfer and is stable.

## 2025-05-11 NOTE — PLAN OF CARE
of viable baby boy with Dr Perez at bedside for delivery at 1015. Placenta delivered at 1101. Fentanyl given for manual placenta removal, see MAR. Second degree lac repaired by provider. Apgars 8/9 at 1 and 5 min respectively. VSS, ex BPs elevated. Baby skin to skin with mother,  supportive at bedside.

## 2025-05-12 LAB — HGB BLD-MCNC: 11.7 G/DL (ref 11.7–15.7)

## 2025-05-12 PROCEDURE — 250N000013 HC RX MED GY IP 250 OP 250 PS 637: Performed by: OBSTETRICS & GYNECOLOGY

## 2025-05-12 PROCEDURE — 250N000013 HC RX MED GY IP 250 OP 250 PS 637

## 2025-05-12 PROCEDURE — 85018 HEMOGLOBIN: CPT | Performed by: OBSTETRICS & GYNECOLOGY

## 2025-05-12 PROCEDURE — 120N000012 HC R&B POSTPARTUM

## 2025-05-12 PROCEDURE — 36415 COLL VENOUS BLD VENIPUNCTURE: CPT | Performed by: OBSTETRICS & GYNECOLOGY

## 2025-05-12 RX ORDER — NIFEDIPINE 30 MG/1
30 TABLET, EXTENDED RELEASE ORAL DAILY
Status: DISCONTINUED | OUTPATIENT
Start: 2025-05-12 | End: 2025-05-13 | Stop reason: HOSPADM

## 2025-05-12 RX ADMIN — NIFEDIPINE 30 MG: 30 TABLET, FILM COATED, EXTENDED RELEASE ORAL at 12:42

## 2025-05-12 RX ADMIN — IBUPROFEN 800 MG: 400 TABLET, FILM COATED ORAL at 17:20

## 2025-05-12 RX ADMIN — ACETAMINOPHEN 650 MG: 325 TABLET, FILM COATED ORAL at 10:35

## 2025-05-12 RX ADMIN — PRENATAL VITAMINS-IRON FUMARATE 27 MG IRON-FOLIC ACID 0.8 MG TABLET 1 TABLET: at 08:29

## 2025-05-12 RX ADMIN — IBUPROFEN 800 MG: 400 TABLET, FILM COATED ORAL at 03:23

## 2025-05-12 RX ADMIN — ACETAMINOPHEN 650 MG: 325 TABLET, FILM COATED ORAL at 17:20

## 2025-05-12 RX ADMIN — IBUPROFEN 800 MG: 400 TABLET, FILM COATED ORAL at 10:35

## 2025-05-12 RX ADMIN — SENNOSIDES AND DOCUSATE SODIUM 1 TABLET: 50; 8.6 TABLET ORAL at 20:12

## 2025-05-12 RX ADMIN — METHYLCELLULOSE 1000 MG: 500 TABLET ORAL at 08:29

## 2025-05-12 RX ADMIN — ACETAMINOPHEN 650 MG: 325 TABLET, FILM COATED ORAL at 03:23

## 2025-05-12 ASSESSMENT — ACTIVITIES OF DAILY LIVING (ADL)
ADLS_ACUITY_SCORE: 23

## 2025-05-12 NOTE — PLAN OF CARE
BP remains elevated.  Feels well. Started on Nifedine daily. No other symptoms of pre eclampsia. Voiding In good amounts .Seen by grazyna Cheatham started and worked well.  Will continue to monitor.

## 2025-05-12 NOTE — PROGRESS NOTES
"[facility]  Obstetrics Postpartum Rounding Note, [id number]    Vaginal    Interval History:  Doing well. Pain well controlled with tylenol and ibuprofen. Breast feeding, lactation in the room helping. Minimal lochia.   Denies nausea/vomiting. Tolerating diet well. Is passing gas. Ambulating within the room. Voiding without difficulty.   No other concerns.       Physical Exam:  Temp: 98.1  F (36.7  C) Temp src: Oral BP: 130/87 Pulse: 82   Resp: 16 SpO2: 96 % O2 Device: None (Room air)     Weight: 75 kg (165 lb 4.8 oz)  Estimated body mass index is 30.23 kg/m  as calculated from the following:    Height as of 2/25/25: 1.575 m (5' 2\").    Weight as of this encounter: 75 kg (165 lb 4.8 oz).    Gen: AOx3, NAD  Abd: soft, ND,  tender to palpation, Fundus Firm @  umbilicus  Ext: no calf ttp, mild LE edema    Labs:         Hemoglobin   Date Value Ref Range Status   05/12/2025 11.7 11.7 - 15.7 g/dL Final   05/11/2025 14.7 11.7 - 15.7 g/dL Final   07/01/2021 11.9 11.7 - 15.7 g/dL Final   02/25/2021 13.7 11.7 - 15.7 g/dL Final            Lab Results   Component Value Date    RH Pos 02/25/2021       Meds:  Current Facility-Administered Medications   Medication Dose Route Frequency Provider Last Rate Last Admin    acetaminophen (TYLENOL) tablet 650 mg  650 mg Oral Q4H PRN Maria Elena Perez MD   650 mg at 05/12/25 0323    benzocaine (AMERICAINE) 20 % topical spray   Topical 4x Daily PRN Maria Elena Perez MD        bisacodyl (DULCOLAX) suppository 10 mg  10 mg Rectal Daily PRN Maria Elena Perez MD        carboprost (HEMABATE) injection 250 mcg  250 mcg Intramuscular Q15 Min PRN Maria Elena Perez MD        And    loperamide (IMODIUM) capsule 4 mg  4 mg Oral Once PRN Maria Elena Perez MD        hydrALAZINE (APRESOLINE) injection 10 mg  10 mg Intravenous Q20 Min PRN Maria Elena Perez MD        hydrocortisone (Perianal) (ANUSOL-HC) 2.5 % cream   Rectal TID PRN Maria Elena Perez MD        ibuprofen (ADVIL/MOTRIN) tablet 800 " mg  800 mg Oral Q6H PRN Maria Elena Perez MD   800 mg at 25 0323    labetalol (NORMODYNE/TRANDATE) injection 20-80 mg  20-80 mg Intravenous Q10 Min PRN Maria Elena Perez MD        loperamide (IMODIUM) capsule 2 mg  2 mg Oral Q2H PRN Maria Elena Perez MD        methylcellulose (CITRUCEL) tablet 1,000 mg  1,000 mg Oral Daily Maria Elena Perez MD   1,000 mg at 25 1324    methylergonovine (METHERGINE) injection 200 mcg  200 mcg Intramuscular Q2H PRN Maria Elena Perez MD        misoprostol (CYTOTEC) tablet 400 mcg  400 mcg Oral ONCE PRN REPEAT PER INSTRUCTIONS Maria Elena Perez MD        Or    misoprostol (CYTOTEC) tablet 800 mcg  800 mcg Rectal ONCE PRN REPEAT PER INSTRUCTIONS Maria Elena Perez MD        oxytocin (PITOCIN) 30 units in 500 mL 0.9% NaCl infusion  340 mL/hr Intravenous Continuous PRN Maria Elena Perez MD        oxytocin (PITOCIN) injection 10 Units  10 Units Intramuscular Once PRN Maria Elena Perez MD        polyethylene glycol (MIRALAX) Packet 17 g  17 g Oral Daily PRN Maria Elena Perez MD        prenatal multivitamin w/iron per tablet 1 tablet  1 tablet Oral Daily Maria Elena Perez MD        senna-docusate (SENOKOT-S/PERICOLACE) 8.6-50 MG per tablet 2 tablet  2 tablet Oral At Bedtime PRN Maria Elena Perez MD        tranexamic acid 1 g in 100 mL NS IV bag (premix)  1 g Intravenous Q30 Min PRN Maria Elena Perez MD           Assessment/Plan:  Assessment:  1. Postpartum care and examination of lactating mother      D#1 s/p    -Patient status: Doing well and pain well controlled  -Complications: No immediate surgical complications identified.    HTN in postpartum period  -asymptomatic  -start procardia 30mg every day, ordered  -continue to monitor    Plan:  -Feeding Plan: normal diet  -Hgb 11.7  -normotensive, but elevated. Continue to monitor  -Continue routine cares  -Ambulation in room and hallways encouraged.  -Monitor wound for signs of infection, discussed wound maintenance and infectious signs with  patient.  -Reportable signs and symptoms dicussed with the patient.  -Anticipate discharge tomorrow 5/13/25    Lety Muhammad PA-C  May 12, 2025  8:01 AM    Pager #: [phone]

## 2025-05-12 NOTE — PLAN OF CARE
Goal Outcome Evaluation:      Plan of Care Reviewed With: patient, spouse    Overall Patient Progress: improvingOverall Patient Progress: improving  Vital signs stable.  Pt. Denies headache, epigastric pain or visual changes.  Reflexes +2, no clonus.  Postpartum assessment WDL. Pain controlled with Tylenol and Ibuprofen. Ice packs and Tucks for perineum pain. Patient voiding without difficulty. Breastfeeding on cue with minimal assist using nipple shield. Supplementing with HDM with tube at breast.  Patient and spouse bonding well with infant.  Encouraged to call with questions or concerns. Will continue with current plan of care.

## 2025-05-12 NOTE — LACTATION NOTE
"Lactation visit with Cassandra, DENITA, and baby Tone.    Infant just born today at 10am, nursed well after delivery but then has been sleepy. Cassandra's first baby had a VSD and required early and often supplementation, so Cassandra shares she is nervous about infant's sleepiness.     Reviewed  breastfeeding basics:   1. Watch for early feeding cues (licking lips, stirring or rooting, sucking movement with mouth, hands to mouth).  2. Infant should breastfeed on demand and a minimum of 8 times in 24 hours. Encourage/offer to breastfeed infant at least 3 hours (from the start of the last feeding).   3. Offer infant both breasts with each feeding, \"dinner and dessert\"!    Educated on techniques to wake a sleepy baby for feedings, showing parents where this information can be found in the  Handbook. Educated to hand expression technique.     Reviewed breast feeding section in our \"Guide to Postpartum and  Care.\" Highlighting pages that educates to  feeding patterns/behavior: Day 1 infant may be more sleepy (the birthday nap); followed by cluster-feeding (breastfeeding marathon) on second day/night. Also recommended this handbook for future topics including, engorgement, signs/symptoms of mastitis, milk storage guidelines, etc.     Discussed physiology of milk production from colostrum through milk \"coming in\" typically between day 3-5; emphasizing adequate early stimulation to the breast is what causes this change to occur. Educated that early pumping is not necessary if we are having good and regular breastfeeding sessions. But did provide anticipatory guidance to pumping: finding correct flange size, when to begin pumping and building milk storage (ie: pumping once infant is about one month of age, post first morning breast-feed). Educated on products to help with passive milk collection (ie: Haakaa) and when to offer first  bottle. Cassandra has a new breast pump for home use.     Offered " "3 criteria used to know our infants are well fed. . .  1) Feed infant on demand ALWAYS and at least every 3 hours. Goal is 8 feedings in 24 hours. Discussed listening for infant to have audible swallows along with watching for changes in infant's stool color. Encouraged mother to offer both breasts with every feeding session. Talked about the difference of nutritive vs non-nutritive suckling patterns.  2) We reviewed the feeding/output log in back of our booklet. \"What goes in-- must come out\" which is why tracking infant's wet/dirty diapers is important. A well fed infant should be meeting their output goals. Suggested downloading an infant feeding maria a for ease of tracking.   3) Infant's weight: Educated to normal infant weight loss and when infant should be back to birth weight.     Follow up with Pediatrician as requested and encouraged lactation follow up. Provided [facility] outpatient lactation resources. Appreciative of visit.    Lissette Henry RN, IBCLC          "

## 2025-05-12 NOTE — LACTATION NOTE
"Follow up Lactation visit with Cassandra, significant other Ty.  Cassandra reports feeding is going ok, but shared her nipples are tender today. She is using a nipple shield and working on latching with donor milk at the breast. Cassandra had a delay to mature milk production with her first babe until about day 5, so started pumping soon after delivery this time around. She's hopeful feeding will go better this time around.    We discussed trying the Neobridge to supplement at the breast and Cassandra interested to trial it. Placed Neobridge shield, and needed to carefully place the neobridge shield to ensure her nipple was pulled into the shield as Cassandra's nipples are more smooth and almost invert. Demonstrated correct placement technique for Cassandra for Neobridge and for tube/syringe system. Assisted with positioning and we were able to get Tone latched deeply at breast in cross cradle hold, adding pillow supports. He was able to start a nutritive suck pattern with donor milk at the breast, then Ty was able to take over assisting giving donor milk via syringe while he fed. Reviewed giving supplementation at the breast as needed over next few days as milk production is building, with goal to gradually wean as baby is \"taking off\" with breastfeeding\" and building strength and stamina. Recommend Cassandra continue to pump. Reviewed washing and instructions for Neobridge system.    Discussed cluster feeding, what it is and when to expect it, The Second Night, satiety cues, feeding cues, and reviewed Feeding Log for home use. Encouraged to review Breastfeeding section in Your Guide to Postpartum & [place] [facility]. Reviewed milk supply and engorgement. Reviewed typical timeline of milk supply initiation and progression over first 3-5 days postpartum. Discussed comfort measures for engorgement, plugged duct treatment, and warning signs of breast infection.     Feeding plan: Recommend unlimited, frequent breast " feedings: At least 8 - 12 times every 24 hours. Supplement at the breast with Neobridge system while milk production is building. Cassandra to pump after feedings. If baby is still hungry after breastfeeds or doesn't breastfeed well, consider adding in bottle feeding. Encouraged use of feeding log and to record feedings, and void/stool patterns. Cassandra has a breast pump for home use. Reviewed outpatient lactation resources. Cassandra & Paul appreciative of visit.    Beatriz Roman, RN, BSN, MNN, IBCLC

## 2025-05-13 ENCOUNTER — NURSE TRIAGE (OUTPATIENT)
Dept: NURSING | Facility: CLINIC | Age: 35
End: 2025-05-13

## 2025-05-13 VITALS
BODY MASS INDEX: 30.43 KG/M2 | DIASTOLIC BLOOD PRESSURE: 92 MMHG | HEART RATE: 87 BPM | WEIGHT: 166.4 LBS | TEMPERATURE: 99.7 F | SYSTOLIC BLOOD PRESSURE: 140 MMHG | OXYGEN SATURATION: 96 % | RESPIRATION RATE: 16 BRPM

## 2025-05-13 PROBLEM — O13.9 GESTATIONAL HYPERTENSION: Status: ACTIVE | Noted: 2025-05-13

## 2025-05-13 PROCEDURE — 250N000013 HC RX MED GY IP 250 OP 250 PS 637: Performed by: OBSTETRICS & GYNECOLOGY

## 2025-05-13 PROCEDURE — 250N000013 HC RX MED GY IP 250 OP 250 PS 637

## 2025-05-13 RX ORDER — NIFEDIPINE 30 MG
30 TABLET, EXTENDED RELEASE ORAL DAILY
Qty: 30 TABLET | Refills: 1 | Status: SHIPPED | OUTPATIENT
Start: 2025-05-14

## 2025-05-13 RX ORDER — ACETAMINOPHEN 325 MG/1
650 TABLET ORAL EVERY 4 HOURS PRN
Qty: 60 TABLET | Refills: 1 | Status: SHIPPED | OUTPATIENT
Start: 2025-05-13

## 2025-05-13 RX ORDER — IBUPROFEN 600 MG/1
600 TABLET, FILM COATED ORAL EVERY 6 HOURS PRN
Qty: 60 TABLET | Refills: 1 | Status: SHIPPED | OUTPATIENT
Start: 2025-05-13

## 2025-05-13 RX ADMIN — ACETAMINOPHEN 650 MG: 325 TABLET, FILM COATED ORAL at 09:47

## 2025-05-13 RX ADMIN — METHYLCELLULOSE 1000 MG: 500 TABLET ORAL at 08:39

## 2025-05-13 RX ADMIN — PRENATAL VITAMINS-IRON FUMARATE 27 MG IRON-FOLIC ACID 0.8 MG TABLET 1 TABLET: at 08:39

## 2025-05-13 RX ADMIN — IBUPROFEN 800 MG: 400 TABLET, FILM COATED ORAL at 09:47

## 2025-05-13 RX ADMIN — NIFEDIPINE 30 MG: 30 TABLET, FILM COATED, EXTENDED RELEASE ORAL at 08:37

## 2025-05-13 RX ADMIN — ACETAMINOPHEN 650 MG: 325 TABLET, FILM COATED ORAL at 03:46

## 2025-05-13 RX ADMIN — IBUPROFEN 800 MG: 400 TABLET, FILM COATED ORAL at 03:46

## 2025-05-13 ASSESSMENT — ACTIVITIES OF DAILY LIVING (ADL)
ADLS_ACUITY_SCORE: 23

## 2025-05-13 NOTE — LACTATION NOTE
This note was copied from a baby's chart.  Follow up lactation visit with family. Cassandra reports infant feedings are going very well. Pt using neotech bridge device for supplementation at the breast. No questions/concerns at this time. Additional neotech device given for discharge; discussed the short term use (20 times) for device. If supplementation indicated beyond first few days, parents will need to switch to different supplementation method (SNS, bottle, etc)

## 2025-05-13 NOTE — PLAN OF CARE
Goal Outcome Evaluation:      Plan of Care Reviewed With: patient, spouse    Overall Patient Progress: improvingOverall Patient Progress: improving     BPs 125/76, 127/83. Denies headache, vision changes, epigastric pain. Reflexes normal, no clonus noted. Fundus firm. Lochia scant. Using Tylenol/Motrin for uterine cramping pain with good relief. Using ice packs/tucks for comfort. Voiding without issues. Up ad richi. Breastfeeding  with Neobridge. Independent with  cares. Encouraged to call with questions/concerns. Spouse at bedside and supportive.

## 2025-05-13 NOTE — PROGRESS NOTES
[facility]    Post-Partum Progress Note    Assessment & Plan   Assessment:  Post-partum day #2  Normal spontaneous vaginal delivery    Doing well.  No excessive bleeding  Pain well-controlled.    Plan:  Ambulation encouraged  Lactation consultation  Pain control measures as needed  Reportable signs and symptoms dicussed with the patient  Discharge later today  Follow up with me in 6wks for routine PP visit    Jes Foley MD     Interval History   Doing well.  Pain is well-controlled.  No fevers.  No history of foul-smelling vaginal discharge.  Good appetite.  Denies chest pain, shortness of breath, nausea or vomiting.  Vaginal bleeding is similar to a heavy menstrual flow.  Ambulatory.  Breastfeeding well.  No issues overnight    Medications   Current Facility-Administered Medications   Medication Dose Route Frequency Provider Last Rate Last Admin    oxytocin (PITOCIN) 30 units in 500 mL 0.9% NaCl infusion  340 mL/hr Intravenous Continuous PRN Maria Elena Perez MD         Current Facility-Administered Medications   Medication Dose Route Frequency Provider Last Rate Last Admin    methylcellulose (CITRUCEL) tablet 1,000 mg  1,000 mg Oral Daily Maria Elena Perez MD   1,000 mg at 05/12/25 0829    NIFEdipine ER OSMOTIC (PROCARDIA XL) 24 hr tablet 30 mg  30 mg Oral Daily Lety Muhammad PA-C   30 mg at 05/12/25 1242    prenatal multivitamin w/iron per tablet 1 tablet  1 tablet Oral Daily Maria Elena Perez MD   1 tablet at 05/12/25 0829       Physical Exam   Temp: 98.1  F (36.7  C) Temp src: Oral BP: 127/83 Pulse: 86   Resp: 16   O2 Device: None (Room air)    Vitals:    05/12/25 0638 05/13/25 0626   Weight: 75 kg (165 lb 4.8 oz) 75.5 kg (166 lb 6.4 oz)     Vital Signs with Ranges  Temp:  [97.2  F (36.2  C)-98.5  F (36.9  C)] 98.1  F (36.7  C)  Pulse:  [84-86] 86  Resp:  [16] 16  BP: (125-130)/(76-93) 127/83  I/O last 3 completed shifts:  In: 2350 [P.O.:2350]  Out: 2900 [Urine:2900]    Uterine  fundus is firm, non-tender and at the level of the umbilicus  Extremities Non-tender    Data   Recent Labs   Lab Test 05/11/25  0948 08/09/21  1316 02/25/21  1004   ABO  --   --  O   RH  --   --  Pos   AS Negative   < > Neg    < > = values in this interval not displayed.     Recent Labs   Lab Test 05/12/25  0634 05/11/25  0948   HGB 11.7 14.7     Recent Labs   Lab Test 10/23/24  1145   RUQIGG Positive

## 2025-05-13 NOTE — DISCHARGE INSTRUCTIONS
Warning Signs after Having a Baby    Keep this paper on your fridge or somewhere else where you can see it.    Call your provider if you have any of these symptoms up to 12 weeks after having your baby.    Thoughts of hurting yourself or your baby  Pain in your chest or trouble breathing  Severe headache not helped by pain medicine  Eyesight concerns (blurry vision, seeing spots or flashes of light, other changes to eyesight)  Fainting, shaking or other signs of a seizure    Call 9-1-1 if you feel that it is an emergency.     The symptoms below can happen to anyone after giving birth. They can be very serious. Call your provider if you have any of these warning signs.    My provider s phone number: _______________________    Losing too much blood (hemorrhage)    Call your provider if you soak through a pad in less than an hour or pass blood clots bigger than a golf ball. These may be signs that you are bleeding too much.    Blood clots in the legs or lungs    After you give birth, your body naturally clots its blood to help prevent blood loss. Sometimes this increased clotting can happen in other areas of the body, like the legs or lungs. This can block your blood flow and be very dangerous.     Call your provider if you:  Have a red, swollen spot on the back of your leg that is warm or painful when you touch it.   Are coughing up blood.     Infection    Call your provider if you have any of these symptoms:  Fever of 100.4 F (38 C) or higher.  Pain or redness around your stitches if you had an incision.   Any yellow, white, or green fluid coming from places where you had stitches or surgery.    Mood Problems (postpartum depression)    Many people feel sad or have mood changes after having a baby. But for some people, these mood swings are worse.     Call your provider right away if you feel so anxious or nervous that you can't care for yourself or your baby.    Preeclampsia (high blood pressure)    Even if you  "didn't have high blood pressure when you were pregnant, you are at risk for the high blood pressure disease called preeclampsia. This risk can last up to 12 weeks after giving birth.     Call your provider if you have:   Pain on your right side under your rib cage  Sudden swelling in the hands and face    Remember: You know your body. If something doesn't feel right, get medical help.     For informational purposes only. Not to replace the advice of your health care provider. Copyright 2020 [facility]. All rights reserved. Clinically reviewed by Eliza Spears, RNC-OB, MSN. [employer] [id number] - Rev .    Postpartum Care at Home With Your Baby: Care Instructions  Overview     After childbirth (postpartum period), your body goes through many changes as you recover. In these weeks after delivery, try to take good care of yourself. Get rest whenever you can and accept help from others.  It may take 4 to 6 weeks to feel like yourself again, and possibly longer if you had a  birth. You may feel sore or very tired as you recover. After delivery, you may continue to have contractions as the uterus returns to the size it was before your pregnancy. You will also have some vaginal bleeding. And you may have pain around the vagina as you heal. Several days after delivery you may also have pain and swelling in your breasts as they fill with milk. There are things you can do at home to help ease these discomforts.  After childbirth, it's common to feel emotional. You may feel irritable, cry easily, and feel happy one minute and sad the next. This is called the \"baby blues.\" Hormone changes are one cause of these emotional changes. These feelings usually get better within a couple of weeks. If they don't, talk to your doctor or midwife.  In the first couple of weeks after you give birth, your doctor or midwife may want to check in with you and make a plan for follow-up care. You will likely have a " complete postpartum visit in the first 3 months after delivery. At that time, your doctor or midwife will check on your recovery and see how you're doing. But if you have questions or concerns before then, you can always call your doctor or midwife.  Follow-up care is a key part of your treatment and safety. Be sure to make and go to all appointments, and call your doctor if you are having problems. It's also a good idea to know your test results and keep a list of the medicines you take.  How can you care for yourself at home?  Taking care of your body  Use pads instead of tampons for bleeding. After birth, you will have bloody vaginal discharge. You may also pass some blood clots that shouldn't be bigger than an egg. Over the next 6 weeks or so, your bleeding should decrease a little every day and slowly change to a pinkish and then whitish discharge.  For cramps or mild pain, try an over-the-counter pain medicine, such as acetaminophen (Tylenol) or ibuprofen (Advil, Motrin). Read and follow all instructions on the label.  To ease pain around the vagina or from hemorrhoids:  Put ice or a cold pack on the area for 10 to 20 minutes at a time. Put a thin cloth between the ice and your skin.  Try sitting in a few inches of warm water (sitz bath) when you can or after bowel movements.  Clean yourself with a gentle squeeze of warm water from a bottle instead of wiping with toilet paper.  Use witch hazel or hemorrhoid pads (such as Tucks).  Try using a cold compress for sore and swollen breasts. And wear a supportive bra that fits.  Ease constipation by drinking plenty of fluids and eating high-fiber foods. Ask your doctor or midwife about over-the-counter stool softeners.  Activity  Rest when you can.  Ask for help from family or friends when you need it.  If you can, have another adult in your home for at least 2 or 3 days after birth.  When you feel ready, try to get some exercise every day. For many people, walking  is a good choice. Don't do any heavy exercise until your doctor or midwife says it's okay.  Ask your doctor or midwife when it is okay to have vaginal sex.  If you don't want to get pregnant, talk to your doctor or midwife about birth control options. You can get pregnant even before your period returns. Also, you can get pregnant while you are breastfeeding.  Talk to your doctor or midwife if you want to get pregnant again. They can talk to you about when it is safe.  Emotional health  It's normal to have some sadness, anxiety, and mood swings after delivery. It may help to talk with a trusted friend or family member. You can also call the Maternal Mental Health Hotline at 8-958-[facility] (1-555.220.1756) for support. If these mood changes last more than a couple of weeks, talk to your doctor or midwife.  When should you call for help?  Share this information with your partner, family, or a friend. They can help you watch for warning signs.  Call 911  anytime you think you may need emergency care. For example, call if:    You feel you cannot stop from hurting yourself, your baby, or someone else.     You passed out (lost consciousness).     You have chest pain, are short of breath, or cough up blood.     You have a seizure.   Where to get help 24 hours a day, 7 days a week   If you or someone you know talks about suicide, self-harm, a mental health crisis, a substance use crisis, or any other kind of emotional distress, get help right away. You can:    Call the Suicide and Crisis Lifeline at 748.     Call 8-116-186-TALK (1-741.475.1858).     Text HOME to 890145 to access the Crisis Text Line.   Consider saving these numbers in your phone.  Go to [employer] for more information or to chat online.  Call your doctor or midwife now or seek immediate medical care if:    You have signs of hemorrhage (too much bleeding), such as:  Heavy vaginal bleeding. This means that you are soaking through one or more pads in an  "hour. Or you pass blood clots bigger than an egg.  Feeling dizzy or lightheaded, or you feel like you may faint.  Feeling so tired or weak that you cannot do your usual activities.  A fast or irregular heartbeat.  New or worse belly pain.     You have signs of infection, such as:  A fever.  Increased pain, swelling, warmth, or redness from an incision or wound.  Frequent or painful urination or blood in your urine.  Vaginal discharge that smells bad.  New or worse belly pain.     You have symptoms of a blood clot in your leg (called a deep vein thrombosis), such as:  Pain in the calf, back of the knee, thigh, or groin.  Swelling in the leg or groin.  A color change on the leg or groin. The skin may be reddish or purplish, depending on your usual skin color.     You have signs of preeclampsia, such as:  Sudden swelling of your face, hands, or feet.  New vision problems (such as dimness, blurring, or seeing spots).  A severe headache.     You have signs of heart failure, such as:  New or increased shortness of breath.  New or worse swelling in your legs, ankles, or feet.  Sudden weight gain, such as more than 2 to 3 pounds in a day or 5 pounds in a week.  Feeling so tired or weak that you cannot do your usual activities.     You had spinal or epidural pain relief and have:  New or worse back pain.  Increased pain, swelling, warmth, or redness at the injection site.  Tingling, weakness, or numbness in your legs or groin.   Watch closely for changes in your health, and be sure to contact your doctor or midwife if:    Your vaginal bleeding isn't decreasing.     You feel sad, anxious, or hopeless for more than a few days.     You are having problems with your breasts or breastfeeding.   Where can you learn more?  Go to https://www.healthwise.net/patiented  Enter Z768 in the search box to learn more about \"Postpartum Care at Home With Your Baby: Care Instructions.\"  Current as of: April 30, 2024  Content Version: 14.4    " [id number] [employer].   Care instructions adapted under license by your healthcare professional. If you have questions about a medical condition or this instruction, always ask your healthcare professional. [employer] disclaims any warranty or liability for your use of this information.

## 2025-05-13 NOTE — PLAN OF CARE
BP and Temp remain elevated. Dr Foley notified - no orders- discharge to home today. Feels well. Oral pain medications working well. Breast feeding with a Neobridge. - going well.  Ready for discharge home with baby.

## 2025-05-13 NOTE — PLAN OF CARE
VSS./88. Denies any s/s of pre-eclampsia.Reflexes WDL, No Clonus.  Cont on Procardia XL 30 mg daily.  Ibuprofen & tylenol providing adequate pain control for perineal discomfort. Also using ice and tucks pads.   Jerome. Reg diet.   Voiding.    Showered.    breast feeding well with help to latch and position. Using Neobridge Shield and supp @ breast with HDM.  Pumping for EBM.  Goal Outcome Evaluation:      Plan of Care Reviewed With: patient, spouse    Overall Patient Progress: improvingOverall Patient Progress: improving

## 2025-05-14 ENCOUNTER — ALLIED HEALTH/NURSE VISIT (OUTPATIENT)
Dept: OBGYN | Facility: CLINIC | Age: 35
End: 2025-05-14
Payer: COMMERCIAL

## 2025-05-14 VITALS — SYSTOLIC BLOOD PRESSURE: 140 MMHG | DIASTOLIC BLOOD PRESSURE: 97 MMHG

## 2025-05-14 DIAGNOSIS — O13.9 GESTATIONAL HYPERTENSION: Primary | ICD-10-CM

## 2025-05-14 PROCEDURE — 99211 OFF/OP EST MAY X REQ PHY/QHP: CPT | Mod: 24

## 2025-05-14 PROCEDURE — 3077F SYST BP >= 140 MM HG: CPT

## 2025-05-14 PROCEDURE — 3080F DIAST BP >= 90 MM HG: CPT

## 2025-05-14 NOTE — TELEPHONE ENCOUNTER
Nurse Triage SBAR    Is this a 2nd Level Triage? YES, LICENSED PRACTITIONER REVIEW IS REQUIRED    Situation: High blood pressure    Background: Had a baby on 5-11-25 and started on Nifedipine    Assessment: 158/99 tonight with a recheck of 149/95.  Denies acute neurological and cardiac symptoms, face/hand swelling.    Protocol Recommended Disposition:   See HCP Within 4 Hours (Or PCP Triage)    Non-Primary Care Provider (Specialties/Contract Clinics)    Provider consult indicated.     Reason for page: HTN    Page sent to Dr. Foley by RN at 11:11pm.   Non-Primary Care Provider (Specialties/Contract Clinics)    No response from Dr. Foley.     Answering service paged other OB provider listed on call Dr. Renee Shirley, at 11:29 pm.     Non-Primary Care Provider (Specialties/Contract Clinics)      Provider, Dr. Shirley, returning page to [facility] at 11:30 pm    Provider recommended plan of care: Take an extra nifedipine now and monitor BP after resting.  160/110 call back or for worsening symptoms and call clinic in the morning.    Provider Recommendation Follow Up:   Reached patient/caregiver. Informed of provider's recommendations. Patient verbalized understanding and agrees with the plan.     Ramona Richter RN  [facility]    Recommendation: See above     Paged to provider    Does the patient meet one of the following criteria for ADS visit consideration? 16+ years old, with an [facility] PCP       Reason for Disposition   Systolic BP >= 140 OR Diastolic >= 90    Additional Information   Negative: Difficult to awaken or acting confused (e.g., disoriented, slurred speech)   Negative: SEVERE difficulty breathing (e.g., struggling for each breath, speaks in single words)   Negative: [1] Weakness of the face, arm or leg on one side of the body AND [2] new-onset   Negative: [1] Numbness (i.e., loss of sensation) of the face, arm or leg on one side of the body AND [2] new- onset   Negative: Seizure occurred and  has stopped   Negative: Sounds like a life-threatening emergency to the triager   Negative: New hand or face swelling   Negative: Systolic BP >= 160 OR Diastolic >= 110   Negative: Patient sounds very sick or weak to the triager    Protocols used: Postpartum - High Blood Pressure-A-AH

## 2025-05-14 NOTE — PROGRESS NOTES
BLOOD PRESSURE CHECK    Subjective:    Cassandra is being seen in clinic for a blood pressure check due to Gestational hypertension . Patient is postpartum (delivery date: 5/11/2025).    Patient reports taking the following antihypertensive medications: nifedipine 30mg daily    Patient denies chest pain, SOB, visual changes, severe headache, lower extremity edema, and RUQ pain.    Pt called nurse line overnight for elevated BP. BP at 2300 was 158/99, recheck 149/95. Was instructed to take an extra dose of procardia by on call provider Dr. Shirley last night. Pt took extra dose ~2330.    Reports her BP this morning prior to taking medication was 142/97.      Objective:    Patient Vitals for the past 24 hrs:   BP   05/14/25 1336 (!) 140/97   05/14/25 1330 (!) 152/100      Pt brought home cuff, reading was 150/101.    Two blood pressures were taken, at least one minute apart.     Plan:    Blood pressure results are in the HIGH (-159 or DBP ) range of the ACOG Hypertensive Disorders of pregnancy thresholds for pregnant and recently pregnant patients.    Dr. oFley stopped by pt room and notified of pt BP. MD discussed plan with pt in room. Provider recommended pt to increase procardia to BID. Would like pt to take blood pressure twice a day. If /100, pt to recheck BP and contact clinic if still elevated. Pt to call clinic for Bps 160/110 or greater. Discussed plan with patient. Reviewed with patient to call triage number if symptoms (chest pain, shortness of breath, visual changes, severe headache, lower extremity edema or right upper quadrant pain) occur. Pt to update clinic on Tuesday with BP readings. Pt will stop by  on way out to schedule 6 week PP appointment as well. Patient verbalized understanding of plan.     Veronica Rey RN on 5/14/2025 at 2:39 PM  [facility] Triage

## 2025-05-16 ENCOUNTER — MEDICAL CORRESPONDENCE (OUTPATIENT)
Dept: HEALTH INFORMATION MANAGEMENT | Facility: CLINIC | Age: 35
End: 2025-05-16
Payer: COMMERCIAL

## 2025-06-24 PROBLEM — Z30.41 USES ORAL CONTRACEPTION: Status: RESOLVED | Noted: 2022-12-05 | Resolved: 2025-06-24

## 2025-06-24 PROBLEM — Z36.89 ENCOUNTER FOR TRIAGE IN PREGNANT PATIENT: Status: RESOLVED | Noted: 2025-05-11 | Resolved: 2025-06-24

## 2025-06-24 PROBLEM — O42.90 DELAYED DELIVERY AFTER SROM (SPONTANEOUS RUPTURE OF MEMBRANES): Status: RESOLVED | Noted: 2025-05-11 | Resolved: 2025-06-24

## 2025-06-24 NOTE — PROGRESS NOTES
SUBJECTIVE:  Cassandra Langley,  is here for a postpartum visit.  She had a  on 25 delivering a healthy baby boy, named Tone weighing 6 lbs 1 oz at term.      HPI:  Here today for postpartum visit --doing great!  Much easier recovery than with her first.  Baby Tone has been very good baby --great eater and sleeper.  Exclusively pumping and bottle feeding.  Up to 10lbs!!  Has gone a few 5hr stretches between nighttime feedings --most often every 3-4 hours.    Cassandra is doing well.  Bleeding stopped after just 2 weeks.  Eating/drinking well.  No bowel/bladder issues.  Has had some very light pink tinged spotting with BM--not every time.  Denies constipation.  No bladder issues.  Has not resumed SA.    plans to do vasectomy but will use minipill until he is done  Moods good --denies depression or anxiety  Back to work parttime in September    Last PHQ-9 score on record=       2025     2:53 PM   PHQ-9 SCORE   PHQ-9 Total Score 0         2024     2:13 PM 10/16/2024     4:16 PM 2025     2:53 PM   IVAN-7 SCORE   Total Score  0 (minimal anxiety)    Total Score 0 0 0       Pap:   Lab Results   Component Value Date    GYNINTERP  10/12/2021     Negative for Intraepithelial Lesion or Malignancy (NILM)    PAP NIL 2018    PAP NIL 10/15/2015       Delivery complications:  No  Breast feeding: No, pumping and bottle feeding  Bladder problems:  No  Bowel problems/hemorrhoids:  No  Episiotomy/laceration/incision healed? Yes  Vaginal flow:  None  [place]:  No  Contraception: oral contraceptives and vasectomy  Emotional adjustment:  doing well and happy  Back to work:  Back at work in  point review of systems negative other than symptoms noted below or in the HPI.    OBJECTIVE:  Vitals: /86   Wt 72.1 kg (159 lb)   LMP 2024 (Exact Date)   Breastfeeding Yes   BMI 29.08 kg/m    BMI= Body mass index is 29.08 kg/m .  General - pleasant female in no  acute distress.  Breast -  deferred  Abdomen - No incision  Pelvic - EG: normal adult female, BUS: within normal limits, Vagina: well rugated, no discharge, Cervix: no lesions or CMT, Uterus: firm, normal sized and nontender, midplane in position. Adnexae: no masses or tenderness.  Rectovaginal - deferred.    ASSESSMENT:    ICD-10-CM    1. Encounter for postpartum visit  Z39.2       2. OCP (oral contraceptive pills) initiation  Z30.011 norethindrone (MICRONOR) 0.35 MG tablet          PLAN:  May resume normal activities without restrictions.  Pap smear was not done today.    Full counseling was provided, and all questions were answered.   Return to clinic in one year for an annual visit.     Patient Instructions   Follow up with your primary care provider for your other medical problems.  Continue self breast exam.  Increase physical activity and exercise.  Usual safety and preventative measures counseling done.  Weight loss encouraged.  Last pap smear (2021) was normal and negative for the DNA of high risk HPV subtypes.  No pap was obtained this year.  This was discussed with the patient and she agrees with the plan.   Will start progesterone only pill for contraception while pumping/breast feeding.  Rx sent.  Will use backup contraception for first week of pill pack and call when done breast feeding to transition to combined OCP as needed.  May resume all normal activities.  Jes Foley MD

## 2025-06-25 ENCOUNTER — PRENATAL OFFICE VISIT (OUTPATIENT)
Dept: OBGYN | Facility: CLINIC | Age: 35
End: 2025-06-25
Payer: COMMERCIAL

## 2025-06-25 VITALS — WEIGHT: 159 LBS | SYSTOLIC BLOOD PRESSURE: 122 MMHG | DIASTOLIC BLOOD PRESSURE: 86 MMHG | BODY MASS INDEX: 29.08 KG/M2

## 2025-06-25 DIAGNOSIS — Z30.011 OCP (ORAL CONTRACEPTIVE PILLS) INITIATION: ICD-10-CM

## 2025-06-25 PROCEDURE — 3074F SYST BP LT 130 MM HG: CPT | Performed by: OBSTETRICS & GYNECOLOGY

## 2025-06-25 PROCEDURE — 99207 PR POST PARTUM EXAM: CPT | Performed by: OBSTETRICS & GYNECOLOGY

## 2025-06-25 PROCEDURE — 3079F DIAST BP 80-89 MM HG: CPT | Performed by: OBSTETRICS & GYNECOLOGY

## 2025-06-25 PROCEDURE — 0503F POSTPARTUM CARE VISIT: CPT | Performed by: OBSTETRICS & GYNECOLOGY

## 2025-06-25 RX ORDER — ACETAMINOPHEN AND CODEINE PHOSPHATE 120; 12 MG/5ML; MG/5ML
0.35 SOLUTION ORAL DAILY
Qty: 84 TABLET | Refills: 3 | Status: SHIPPED | OUTPATIENT
Start: 2025-06-25

## 2025-06-25 ASSESSMENT — ANXIETY QUESTIONNAIRES
7. FEELING AFRAID AS IF SOMETHING AWFUL MIGHT HAPPEN: NOT AT ALL
GAD7 TOTAL SCORE: 0
6. BECOMING EASILY ANNOYED OR IRRITABLE: NOT AT ALL
IF YOU CHECKED OFF ANY PROBLEMS ON THIS QUESTIONNAIRE, HOW DIFFICULT HAVE THESE PROBLEMS MADE IT FOR YOU TO DO YOUR WORK, TAKE CARE OF THINGS AT HOME, OR GET ALONG WITH OTHER PEOPLE: NOT DIFFICULT AT ALL
2. NOT BEING ABLE TO STOP OR CONTROL WORRYING: NOT AT ALL
3. WORRYING TOO MUCH ABOUT DIFFERENT THINGS: NOT AT ALL
5. BEING SO RESTLESS THAT IT IS HARD TO SIT STILL: NOT AT ALL
GAD7 TOTAL SCORE: 0
1. FEELING NERVOUS, ANXIOUS, OR ON EDGE: NOT AT ALL

## 2025-06-25 ASSESSMENT — PATIENT HEALTH QUESTIONNAIRE - PHQ9
SUM OF ALL RESPONSES TO PHQ QUESTIONS 1-9: 0
5. POOR APPETITE OR OVEREATING: NOT AT ALL

## 2025-06-25 NOTE — PATIENT INSTRUCTIONS
Follow up with your primary care provider for your other medical problems.  Continue self breast exam.  Increase physical activity and exercise.  Usual safety and preventative measures counseling done.  Weight loss encouraged.  Last pap smear (2021) was normal and negative for the DNA of high risk HPV subtypes.  No pap was obtained this year.  This was discussed with the patient and she agrees with the plan.   Will start progesterone only pill for contraception while pumping/breast feeding.  Rx sent.  Will use backup contraception for first week of pill pack and call when done breast feeding to transition to combined OCP as needed.  May resume all normal activities.

## 2025-07-31 ENCOUNTER — MEDICAL CORRESPONDENCE (OUTPATIENT)
Dept: HEALTH INFORMATION MANAGEMENT | Facility: CLINIC | Age: 35
End: 2025-07-31
Payer: COMMERCIAL

## 2025-08-05 ENCOUNTER — PATIENT OUTREACH (OUTPATIENT)
Dept: CARE COORDINATION | Facility: CLINIC | Age: 35
End: 2025-08-05
Payer: COMMERCIAL